# Patient Record
Sex: FEMALE | Race: WHITE | NOT HISPANIC OR LATINO | Employment: OTHER | URBAN - METROPOLITAN AREA
[De-identification: names, ages, dates, MRNs, and addresses within clinical notes are randomized per-mention and may not be internally consistent; named-entity substitution may affect disease eponyms.]

---

## 2017-05-09 ENCOUNTER — OFFICE VISIT (OUTPATIENT)
Dept: ENDOCRINOLOGY | Facility: CLINIC | Age: 62
End: 2017-05-09
Payer: COMMERCIAL

## 2017-05-09 VITALS
DIASTOLIC BLOOD PRESSURE: 70 MMHG | TEMPERATURE: 98.2 F | WEIGHT: 163.8 LBS | BODY MASS INDEX: 27.96 KG/M2 | SYSTOLIC BLOOD PRESSURE: 112 MMHG | RESPIRATION RATE: 16 BRPM | HEART RATE: 62 BPM | HEIGHT: 64 IN

## 2017-05-09 DIAGNOSIS — E03.4 HYPOTHYROIDISM DUE TO ACQUIRED ATROPHY OF THYROID: Primary | ICD-10-CM

## 2017-05-09 DIAGNOSIS — M85.80 OSTEOPENIA: ICD-10-CM

## 2017-05-09 LAB
T3 SERPL-MCNC: 110 NG/DL (ref 60–181)
T4 FREE SERPL-MCNC: 1.14 NG/DL (ref 0.76–1.46)

## 2017-05-09 PROCEDURE — 84480 ASSAY TRIIODOTHYRONINE (T3): CPT | Performed by: CLINICAL NURSE SPECIALIST

## 2017-05-09 PROCEDURE — 36415 COLL VENOUS BLD VENIPUNCTURE: CPT | Performed by: CLINICAL NURSE SPECIALIST

## 2017-05-09 PROCEDURE — 99213 OFFICE O/P EST LOW 20 MIN: CPT | Performed by: CLINICAL NURSE SPECIALIST

## 2017-05-09 PROCEDURE — 84443 ASSAY THYROID STIM HORMONE: CPT | Performed by: CLINICAL NURSE SPECIALIST

## 2017-05-09 PROCEDURE — 84439 ASSAY OF FREE THYROXINE: CPT | Performed by: CLINICAL NURSE SPECIALIST

## 2017-05-09 PROCEDURE — 82306 VITAMIN D 25 HYDROXY: CPT | Performed by: CLINICAL NURSE SPECIALIST

## 2017-05-09 NOTE — NURSING NOTE
"Chief Complaint   Patient presents with     Thyroid Problem     pt feels like her thyroid is \"off\", her nails are splitting and breaking, vision is effected, weight loss is a problem       Initial /70 (BP Location: Left arm, Patient Position: Chair, Cuff Size: Adult Regular)  Pulse 62  Temp 98.2  F (36.8  C) (Oral)  Resp 16  Ht 5' 3.5\" (1.613 m)  Wt 163 lb 12.8 oz (74.3 kg)  LMP 05/08/2008  BMI 28.56 kg/m2 Estimated body mass index is 28.56 kg/(m^2) as calculated from the following:    Height as of this encounter: 5' 3.5\" (1.613 m).    Weight as of this encounter: 163 lb 12.8 oz (74.3 kg).  Medication Reconciliation: complete       Matt Jordan CMA      "

## 2017-05-09 NOTE — MR AVS SNAPSHOT
After Visit Summary   5/9/2017    Yasmine Sutton    MRN: [id number]           Patient Information     Date Of Birth          1955        Visit Information        Provider Department      5/9/2017 12:30 PM Yasmine Rodriguez APRN CNP [facility]        Today's Diagnoses     Hypothyroidism due to acquired atrophy of thyroid    -  1    Osteopenia           Follow-ups after your visit        Follow-up notes from your care team     Return in about 1 year (around 5/9/2018).      Who to contact     If you have questions or need follow up information about today's clinic visit or your schedule please contact [facility] directly at [phone].  Normal or non-critical lab and imaging results will be communicated to you by MyChart, letter or phone within 4 business days after the clinic has received the results. If you do not hear from us within 7 days, please contact the clinic through [email or web address] or phone. If you have a critical or abnormal lab result, we will notify you by phone as soon as possible.  Submit refill requests through [employer] or call your pharmacy and they will forward the refill request to us. Please allow 3 business days for your refill to be completed.          Additional Information About Your Visit        MyChart Information     [employer] gives you secure access to your electronic health record. If you see a primary care provider, you can also send messages to your care team and make appointments. If you have questions, please call your primary care clinic.  If you do not have a primary care provider, please call [phone] and they will assist you.        Care EveryWhere ID     This is your Care EveryWhere ID. This could be used by other organizations to access your [facility] medical records  [id number]        Your Vitals Were     Pulse Temperature Respirations Height Last Period BMI (Body Mass Index)    62 98.2  F (36.8  C) (Oral) 16 1.613 m (5'  "3.5\") 05/08/2008 28.56 kg/m2       Blood Pressure from Last 3 Encounters:   05/09/17 112/70   07/07/16 115/60   03/24/16 114/71    Weight from Last 3 Encounters:   05/09/17 74.3 kg (163 lb 12.8 oz)   07/07/16 68.5 kg (151 lb)   03/24/16 65.3 kg (144 lb)              We Performed the Following     T3 total     T4 FREE     TSH     Vitamin D Deficiency        Primary Care Provider Office Phone # Fax #    Leland Tirado [phone]       [facility] [address] [facility]  [address]  [facility] [address]        Thank you!     Thank you for choosing [facility]  for your care. Our goal is always to provide you with excellent care. Hearing back from our patients is one way we can continue to improve our services. Please take a few minutes to complete the written survey that you may receive in the mail after your visit with us. Thank you!             Your Updated Medication List - Protect others around you: Learn how to safely use, store and throw away your medicines at www.disposemymeds.org.          This list is accurate as of: 5/9/17  1:41 PM.  Always use your most recent med list.                   Brand Name Dispense Instructions for use    buPROPion 150 MG 24 hr tablet    WELLBUTRIN XL    90 tablet    Take 1 tablet (150 mg) by mouth daily       cholecalciferol 5000 UNITS Tabs tablet    vitamin D3     Take 5,000 Units by mouth daily       liothyronine 5 MCG tablet    CYTOMEL    90 tablet    Take 1 tablet (5 mcg) by mouth daily ; please take 0.5 tablets twice daily.       MAGNESIUM OXIDE PO      Take 400 mg by mouth daily       PROBIOTIC DAILY PO          SYNTHROID 125 MCG tablet   Generic drug:  levothyroxine     30 tablet    Take 1 tablet (125 mcg) by mouth daily Dispense name brand only, no generic         "

## 2017-05-09 NOTE — PROGRESS NOTES
"Name: Yasmine Sutton (Last seen 7/7/2016).  F/u for   Chief Complaint   Patient presents with     Thyroid Problem     pt feels like her thyroid is \"off\", her nails are splitting and breaking, vision is effected, weight loss is a problem     HPI:  Yasmine Sutton is a 61 year old female who presents for the management of hypothyroidism.      Initially diagnosed with hyperthyroidism/Graves' disease in 2000 or 2001.  Was treated with oral medication with remission of the hyperthyroidism.  She subsequently became hypothyroid and was started on Synthroid.  Continued to be treated with Synthroid only until about 2 years ago when Cytomel 5 mcg daily was added due to continued symptoms of hypothyroidism.  Her insurance has changed and she is no longer covered at the clinic where she was previously seen and treated (was also taking bioidentical hormone replacement).  She had been off Cytomel and was not feeling well on Levothyroxine alone; she was c/o  +Dry skin, + weight gain-10 pounds, fatigue, brittle nails, increased hair loss, and constipation.  Cytomel was resumed and levothyroxine was changed to name brand Synthroid.    Synthroid dose was decreased 137-->125 mcg/day April 2016 due to low TSH.  She was not having any particular symptoms.  Cytomel was continued at 5 mcg daily.      She is here today for follow up.    She has not been feeling as well as in the past.  Currently noting generalized dryness - dry hair, dry skin, dry/thin finger and toe nails, dry eyes.  She also noted afternoon fatigue, states she 'hits a wall' in the afternoon and needs to take a short nap in order to continue usual activity.  + gains weight easily.  Previously lost 30 lbs on Slim[employer]ics but has since gained 20 lbs back.  Now seeing a nutritionist.    Partial hysterectomy 12/2014.     +hot flashes and night sweats - history of partial hysterectomy.  FSH elevated consistent with menopause. Took compounded HRT x 3 years, off x2 years.  Hot " flashes and night sweats previously resolved but recently noting return of hot flashes/facial flushing during the day and heat intolerance at night.  2.  Goiter.  Recent thyroid US showed stable goiter, no nodules.    3.  Osteopenia.  Takes vitamin D 5000 IU daily.  Eats 3-4 servings dairy per day - no additional calcium supplements.  Active, exercises regularly.   PMH/PSH:  Past Medical History:   Diagnosis Date     Abnormal Pap smear, can't excl hi gd sq intraepithelial lesion (ASC-H) 9/2014    + HPV 31     Cervical high risk HPV (human papillomavirus) test positive 11/2011, 11/2012    + HPV 31, NIL pap, '13colp:JORDIN I & II & III     Depression, major      PONV (postoperative nausea and vomiting)      Toxic diffuse goiter without mention of thyrotoxic crisis or storm 1991    resolved     Past Surgical History:   Procedure Laterality Date     ABDOMEN SURGERY      Abdominoplasty     APPENDECTOMY       C REMOVAL GALLBLADDER      Cholecystectomy     CYSTOSCOPY N/A 12/9/2014    Procedure: CYSTOSCOPY;  Surgeon: Joy Monterroso DO;  Location: [facility] OR     DAVINCI HYSTERECTOMY TOTAL, BILATERAL SALPINGO-OOPHORECTOMY, COMBINED Bilateral 12/9/2014    Procedure: COMBINED DAVINCI HYSTERECTOMY TOTAL, SALPINGO-OOPHORECTOMY;  Surgeon: Joy Monterroso DO;  Location: [facility] OR     HC EMBOLIZATION UTERINE FIBROID  2007     LEEP TX, CERVICAL  9/2013    JORDIN III     Family Hx:  Family History   Problem Relation Age of Onset     HEART DISEASE Mother      MI     Respiratory Mother      copd     Thyroid Disease Mother      hypothyroid     GASTROINTESTINAL DISEASE Mother      ulcer disease     DIABETES Brother      Thyroid Disease Brother      hypothyroid     Thyroid Disease Paternal Aunt      hypothyroid     GASTROINTESTINAL DISEASE Father      ulcer disease     C.A.D. Father      MI @ 69 (smoker, EtOH)     Thyroid Disease Sister      Thyroid Disease Sister      Thyroid disease: Yes - mother, two sisters, one brother         DM2:  "No         Autoimmune: DM1, SLE, RA, Vitiligo: Yes - brother with type 1 diabetes    Social Hx:  Social History     Social History     Marital status:      Spouse name: N/A     Number of children: N/A     Years of education: N/A     Occupational History           retired     Social History Main Topics     Smoking status: Former Smoker     Packs/day: 0.50     Years: 20.00     Quit date: 1/1/2013     Smokeless tobacco: Former User      Comment: e cig for a short time     Alcohol use 3.0 oz/week     5 Standard drinks or equivalent per week      Comment: 10 drinks on the weekends     Drug use: No     Sexual activity: Yes     Other Topics Concern      Service No     Blood Transfusions No     Caffeine Concern No     Occupational Exposure No     Hobby Hazards No     Sleep Concern No     Stress Concern No     Weight Concern No     Special Diet No     Back Care No     Exercise No     Bike Helmet No     Seat Belt Yes     Parent/Sibling W/ Cabg, Mi Or Angioplasty Before 65f 55m? No     Social History Narrative    , NO CHILDREN.  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[employer]          MEDICATIONS:  has a current medication list which includes the following prescription(s): cholecalciferol, synthroid, bupropion, liothyronine, probiotic product, and magnesium oxide.    ROS   ROS: 10 point ROS neg other than the symptoms noted above in the HPI.    Physical Exam   VS: /70 (BP Location: Left arm, Patient Position: Chair, Cuff Size: Adult Regular)  Pulse 62  Temp 98.2  F (36.8  C) (Oral)  Resp 16  Ht 1.613 m (5' 3.5\")  Wt 74.3 kg (163 lb 12.8 oz)  LMP 05/08/2008  BMI 28.56 kg/m2  GENERAL: NAD, well dressed, answering questions appropriately, appears stated age.  HEENT: no proptosis, EOMI, no lig lag, no retraction, no scleral icterus  NECK:  Supple,   RESPIRATORY: Clear.  Normal respiratory effort.  CARDIOVASCULAR: RRR.  No peripheral edema.  EXTREMITIES: no edema, +pulses, no rashes, no lesions  NEUROLOGY: " CN grossly intact, no tremors  MSK: grossly intact, No digital cyanosis. Normal gait and station.  SKIN: no rashes, no lesions, no ulcers  PSYCH: Intact judgment and insight. A&OX3 with a cordial affect.    LABS:  TFTs:  Component    Latest Ref Rng 8/31/2015 3/11/2016 6/13/2016   TSH    0.40 - 4.00 mU/L 4.69 (H) 0.23 (L) 1.80   T4 Free    0.76 - 1.46 ng/dL 1.15 1.38 1.22   Free T3    2.3 - 4.2 pg/mL 2.2 (L)       TG/TPO:  Component    Latest Ref Rng 4/5/2001 1/6/2009   Thyroid Peroxidase Antibody    <35 IU/mL  07158 (H)   Thyroid Peroxidase Antibody    <2 UNITS/ML >70 H    Thyroglobulin Antibody    <2 UNITS/ML 14 (H)      Component    Latest Ref Rng 8/31/2015   FSH     88.8     Component    Latest Ref Rng 8/31/2015   Vitamin D Deficiency screening    20 - 75 ug/L 32     ULTRASOUND THYROID 9/9/2015    HISTORY: Unspecified hypothyroidism.  FINDINGS: Thyroid ultrasound demonstrates an enlarged thyroid gland.  The right lobe measures 5.1 x 3.0 x 2.5 cm. The left lobe measures 6.3  x 2.2 x 2.8 cm. The isthmus is normal in thickness. Thyroid parenchyma  is heterogeneous in echotexture. Increased color Doppler flow is  noted within the gland.  Thyroid nodules as follows:   Right Lobe: None.  Isthmus: None.  Left Lobe: None.  IMPRESSION  IMPRESSION: Enlarged thyroid gland with heterogeneous echotexture. No  discrete thyroid nodules are appreciated. Increased color Doppler  flow is noted throughout the gland, likely indicating underlying  Thyroiditis.    DEXA 9/8/2015  FINDINGS:  Lumbar Spine (L1-L4) T-score: 1.4  Left Femoral Neck T-score: -0.4  Right Femoral Neck T-score: -1.6    Lumbar (L1-L4) BMD: 1.373 Previous: 1.530   Total Hip Mean BMD: 0.961 Previous: 1.075    Comparison is made to another DXA performed on a different [employer] machine on 12/27/2006.       IMPRESSION  Osteopenia (low bone mass)  Degenerative changes of the spine  Recommendations include ensuring adequate daily Calcium and Vitamin D intake  Follow up  scan can be considered in three to five years.    Comparisons from different scanners that have not been cross calibrated, are not necessarily valid. Such a comparison has been performed here; one should interpret with caution.  Compared to previous bone densitometry performed on this patient, there is the suggestion of a possible trend towards worsening of the lumbar spine, and a possible trend towards worsening of the total hip.    All pertinent notes, labs, and images personally reviewed by me.     A/P  Ms.Linda PANCHO Sutton is a 61 year old here for the evaluation of hypothyroidism:    1. Hypothyroidism.  Currently treated with Synthroid (GIORGI) 125 mcg daily + Cytomel 5 mg qd.    Tried taking Cytomel 2.5 mcg bid but always forgot to take the second dose.    Past history of Graves' disease, + significantly elevated TPO antibodies consistent with Hashimoto's. Continue Synthroid 125 mcg + Cytomel 5 mcg/day.   Noting some afternoon fatigue and increased dry skin and hair.    Plan to recheck TFT's and adjust Synthroid or cytomel dose if indicated.    2.  Osteopenia.    Recommend daily calcium intake 1200 mg/day.    Currently eats 3-4 servings of dairy per day.  Takes no additional calcium supplements.  Currently takes vitamin D 5000 IU daily   Plan to recheck D level today.    Repeat DEXA scan [id number].    Labs ordered today:   Orders Placed This Encounter   Procedures     TSH     T4 FREE     T3 total     Vitamin D Deficiency       More than 50% of the time spent with Ms. Sutton on counseling / coordinating her care.  Total face to face time was greater than or equal to 15 minutes.      Follow-up:  One year, prn sooner if needed.    Yasmine Rodriguez NP  Endocrinology  [facility]  CC: Leland Tirado

## 2017-05-10 LAB
DEPRECATED CALCIDIOL+CALCIFEROL SERPL-MC: 74 UG/L (ref 20–75)
TSH SERPL DL<=0.05 MIU/L-ACNC: 1.23 MU/L (ref 0.4–4)

## 2017-05-11 NOTE — PROGRESS NOTES
Yasmine,  Your thyroid levels look good, I don't think you need any dose adjustments right now.  Your TSH level is a little better than the previous level and free T4, T3 are stable.  If your symptoms persist or worsen, you can recheck thyroid levels again in another 3 months (I'll place the orders, you just need to make a lab only appointment).  Your D level is a little on the high-normal side.  You could reduce your D dose to 3028-9583 per day.  I'm not sure what's causing your symptoms.  Make sure you are eating a healthy, well balanced diet and getting enough protein.  You could add a prenatal or regular multivitamin daily to see if that helps any (the prenatal just has a bit more iron and folic acid).  Let me know if you have any questions.  Yasmine Rodriguez NP  Endocrinology

## 2017-08-11 DIAGNOSIS — Z86.39 H/O GRAVES' DISEASE: ICD-10-CM

## 2017-08-11 DIAGNOSIS — N95.1 MENOPAUSAL SYNDROME (HOT FLASHES): ICD-10-CM

## 2017-08-11 NOTE — TELEPHONE ENCOUNTER
Received refill from mail order for Synthroid and bupropion. These medications are denied, Pt last seen here 1/21/16. Synthroid needs to come from Endo. Sent note back to mail order pharmacy

## 2017-08-14 RX ORDER — LIOTHYRONINE SODIUM 5 UG/1
2.5 TABLET ORAL DAILY
Qty: 45 TABLET | Refills: 3 | Status: SHIPPED | OUTPATIENT
Start: 2017-08-14 | End: 2018-08-10

## 2017-08-18 DIAGNOSIS — E03.4 HYPOTHYROIDISM DUE TO ACQUIRED ATROPHY OF THYROID: ICD-10-CM

## 2017-08-18 RX ORDER — LEVOTHYROXINE SODIUM 125 MCG
125 TABLET ORAL DAILY
Qty: 90 TABLET | Refills: 3 | Status: SHIPPED | OUTPATIENT
Start: 2017-08-18 | End: 2017-11-24

## 2017-08-18 NOTE — TELEPHONE ENCOUNTER
Synthroid 125mcg     Last Written Prescription Date: 8/11/2016  Last Quantity: 30, # refills: 0  Last Office Visit with [facility], [facility] or [facility] prescribing provider: 5/9/2017        TSH   Date Value Ref Range Status   05/09/2017 1.23 0.40 - 4.00 mU/L Final       Routing refill request to provider for review/approval because:  A break in medication  Medication is reported/historical ?    Soraya GARCIA RN, BSN, PHN  [place] [facility] RN

## 2017-10-12 ENCOUNTER — MYC MEDICAL ADVICE (OUTPATIENT)
Dept: ENDOCRINOLOGY | Facility: CLINIC | Age: 62
End: 2017-10-12

## 2017-10-13 NOTE — TELEPHONE ENCOUNTER
Bupropion HCl refill request should go through PCP as it is prescribed for depression.      Patient informed with this and agreed to plan. She will call her new PMD to refills this.     Soraya GARCIA RN, BSN, PHN  [place] [facility] RN

## 2017-10-20 ENCOUNTER — OFFICE VISIT (OUTPATIENT)
Dept: FAMILY MEDICINE | Facility: CLINIC | Age: 62
End: 2017-10-20

## 2017-10-20 VITALS
HEIGHT: 63 IN | WEIGHT: 169 LBS | SYSTOLIC BLOOD PRESSURE: 110 MMHG | RESPIRATION RATE: 16 BRPM | DIASTOLIC BLOOD PRESSURE: 70 MMHG | OXYGEN SATURATION: 98 % | TEMPERATURE: 98.6 F | BODY MASS INDEX: 29.95 KG/M2 | HEART RATE: 76 BPM

## 2017-10-20 DIAGNOSIS — F33.42 MAJOR DEPRESSIVE DISORDER, RECURRENT EPISODE, IN FULL REMISSION (H): Primary | ICD-10-CM

## 2017-10-20 PROCEDURE — 99214 OFFICE O/P EST MOD 30 MIN: CPT | Performed by: FAMILY MEDICINE

## 2017-10-20 RX ORDER — BUPROPION HYDROCHLORIDE 150 MG/1
150 TABLET ORAL DAILY
Qty: 90 TABLET | Refills: 3 | Status: SHIPPED | OUTPATIENT
Start: 2017-10-20 | End: 2018-12-17

## 2017-10-20 ASSESSMENT — PATIENT HEALTH QUESTIONNAIRE - PHQ9: SUM OF ALL RESPONSES TO PHQ QUESTIONS 1-9: 1

## 2017-10-20 NOTE — PROGRESS NOTES
"SUBJECTIVE:  Yasmine Sutton is an 62 year old female who presents for follow-up   of depressive symptoms.  Initially evaluated 25 years ago with depression and irritability. 20 years ago diagnosed with low thyroid.   Current symptoms include   none. Symptoms that have subjectively improved include anger, depressed mood, hopelessness, weight gain, insomnia, fatigue, difficulty with concentration, anxiety, irritablility, sleep disturbance, early morning awakening.  Previous and current treatment modalities   employed include individual therapy and medication(s) Wellbutrin (bupropion).     Organic causes of depression present: hypothyroidism, menopause    Current Outpatient Prescriptions   Medication     SYNTHROID 125 MCG tablet     liothyronine (CYTOMEL) 5 MCG tablet     cholecalciferol (VITAMIN D3) 5000 UNITS TABS tablet     buPROPion (WELLBUTRIN XL) 150 MG 24 hr tablet     Probiotic Product (PROBIOTIC DAILY PO)     MAGNESIUM OXIDE PO     No current facility-administered medications for this visit.      Allergies   Allergen Reactions     No Known Drug Allergies      Side effects of medication: none    Social History   Substance Use Topics     Smoking status: Former Smoker     Packs/day: 0.50     Years: 20.00     Quit date: 1/1/2013     Smokeless tobacco: Former User      Comment: e cig for a short time     Alcohol use 3.0 oz/week     5 Standard drinks or equivalent per week      Comment: 10 drinks on the weekends         Neurologic: negative  Psychiatric: negative  Endocrine: thyroid disorder    OBJECTIVE:  /70 (BP Location: Right arm, Cuff Size: Adult Large)  Pulse 76  Temp 98.6  F (37  C) (Oral)  Ht 1.594 m (5' 2.75\")  Wt 76.7 kg (169 lb)  LMP 05/08/2008  SpO2 98%  BMI 30.18 kg/m2  Mental Status Examination  Posture and motor behavior: negative  Dress, grooming, personal hygiene: negative  Facial expression: negative  Speech: negative  Mood: negative  Coherency and relevance of thought: " negative  Thought content: negative  Perceptions: negative  Orientation: negative  Attention and concentration: negative  Memory: : negative  Information: negative  Vocabulary: negative  Abstract reasoning: negative  Judgment: negative    General appearance: healthy, alert, no distress, cooperative, smiling and over weight  Eyes: conjunctivae/corneas clear. PERRL, EOM's intact. Fundi benign  Ears: negative  Oropharynx: Lips, mucosa, and tongue normal. Teeth and gums normal.  Neck: Neck supple. No adenopathy. Thyroid symmetric, normal size,, Carotids without bruits.  Lungs: negative, Percussion normal. Good diaphragmatic excursion. Lungs clear  Heart: negative, PMI normal. No lifts, heaves, or thrills. RRR. No murmurs, clicks gallops or rub  Abdomen: Abdomen soft, non-tender. BS normal. No masses, organomegaly  Neuro: Gait normal. Reflexes normal and symmetric. Sensation grossly WNL.    ASSESSMENT:(F33.42) Major depressive disorder, recurrent episode, in full remission (H)  (primary encounter diagnosis)  Comment: reviewed GYN needs, prevention  Plan: buPROPion (WELLBUTRIN XL) 150 MG 24 hr tablet        Continue current dosing/ recheck 1 year.  Potential medication side effects were discussed with the patient; let me know if any occur.

## 2017-10-20 NOTE — PATIENT INSTRUCTIONS
Major depressive disorder, recurrent episode, in full remission (H)  (primary encounter diagnosis)  Comment: reviewed GYN needs, prevention  Plan: buPROPion (WELLBUTRIN XL) 150 MG 24 hr tablet        Continue current dosing/ recheck 1 year.  Potential medication side effects were discussed with the patient; let me know if any occur.

## 2017-10-20 NOTE — NURSING NOTE
Patient is here for a recheck of their medication.  Pre-Visit Screening :  Immunizations : declined    Colonoscopy : is due and to be scheduled by patient for later completion  Mammogram : is up to date  Asthma Action Test/Plan : na  PHQ9/GAD7 :  na  Pulse - regular    Medication Reconciliation: complete      CLASSIFICATION OF OVERWEIGHT AND OBESITY BY BMI                         Obesity Class           BMI(kg/m2)  Underweight                                    < 18.5  Normal                                         18.5-24.9  Overweight                                     25.0-29.9  OBESITY                     I                  30.0-34.9                              II                 35.0-39.9  EXTREME OBESITY             III                >40                             Patient's  BMI Body mass index is 30.18 kg/(m^2).  http://hin.nhlbi.nih.gov/menuplanner/menu.cgi  Questioned patient about current smoking habits.  Pt. has never smoked.

## 2017-10-20 NOTE — MR AVS SNAPSHOT
After Visit Summary   10/20/2017    Yasmine Sutton    MRN: [id number]           Patient Information     Date Of Birth          1955        Visit Information        Provider Department      10/20/2017 12:00 PM Elvia Boles MD [facility]        Today's Diagnoses     Major depressive disorder, recurrent episode, in full remission (H)    -  1      Care Instructions     Major depressive disorder, recurrent episode, in full remission (H)  (primary encounter diagnosis)  Comment: reviewed GYN needs, prevention  Plan: buPROPion (WELLBUTRIN XL) 150 MG 24 hr tablet        Continue current dosing/ recheck 1 year.  Potential medication side effects were discussed with the patient; let me know if any occur.              Follow-ups after your visit        Follow-up notes from your care team     Return in about 1 year (around 10/20/2018).      Who to contact     If you have questions or need follow up information about today's clinic visit or your schedule please contact [facility] directly at [phone].  Normal or non-critical lab and imaging results will be communicated to you by [employer], letter or phone within 4 business days after the clinic has received the results. If you do not hear from us within 7 days, please contact the clinic through Ch[employer] or phone. If you have a critical or abnormal lab result, we will notify you by phone as soon as possible.  Submit refill requests through [employer] or call your pharmacy and they will forward the refill request to us. Please allow 3 business days for your refill to be completed.          Additional Information About Your Visit        [employer] Information     [employer] gives you secure access to your electronic health record. If you see a primary care provider, you can also send messages to your care team and make appointments. If you have questions, please call your primary care clinic.  If you do not have a  "primary care provider, please call [phone] and they will assist you.        Care EveryWhere ID     This is your Care EveryWhere ID. This could be used by other organizations to access your [place] medical records  [id number]        Your Vitals Were     Pulse Temperature Respirations Height Last Period Pulse Oximetry    76 98.6  F (37  C) (Oral) 16 1.594 m (5' 2.75\") 05/08/2008 98%    BMI (Body Mass Index)                   30.18 kg/m2            Blood Pressure from Last 3 Encounters:   10/20/17 110/70   05/09/17 112/70   07/07/16 115/60    Weight from Last 3 Encounters:   10/20/17 76.7 kg (169 lb)   05/09/17 74.3 kg (163 lb 12.8 oz)   07/07/16 68.5 kg (151 lb)              Today, you had the following     No orders found for display         Where to get your medicines      These medications were sent to [facility]/pharmacy #[id number] - [place], [place] - [address]  [address] [address] [address]    Hours:  [facility] Phone:  [phone]     buPROPion 150 MG 24 hr tablet          Primary Care Provider Office Phone # Fax #    Elvia Boles [phone] [address] [address]        Equal Access to Services     ZAHRA [facility]IMAR AH: Hadii enmanuel ku hadasho Soomaali, waaxda luqadaha, qaybta kaalmada aliya, naomie manrique . So [facility] [phone].    ATENCIÓN: Si habla español, tiene a lala disposición servicios gratuitos de asistencia lingüística. Dionisio al [phone].    We comply with applicable federal civil rights laws and [place] laws. We do not discriminate on the basis of race, color, national origin, age, disability, sex, sexual orientation, or gender identity.            Thank you!     Thank you for choosing [facility]  for your care. Our goal is always to provide you with excellent care. Hearing back from our patients is one way we can continue to improve our services. Please take a few " minutes to complete the written survey that you may receive in the mail after your visit with us. Thank you!             Your Updated Medication List - Protect others around you: Learn how to safely use, store and throw away your medicines at www.disposemymeds.org.          This list is accurate as of: 10/20/17 12:47 PM.  Always use your most recent med list.                   Brand Name Dispense Instructions for use Diagnosis    buPROPion 150 MG 24 hr tablet    WELLBUTRIN XL    90 tablet    Take 1 tablet (150 mg) by mouth daily    Major depressive disorder, recurrent episode, in full remission (H)       cholecalciferol 5000 UNITS Tabs tablet    vitamin D3     Take 5,000 Units by mouth daily        liothyronine 5 MCG tablet    CYTOMEL    45 tablet    Take 0.5 tablets (2.5 mcg) by mouth daily    H/O Graves' disease, Menopausal syndrome (hot flashes)       MAGNESIUM OXIDE PO      Take 400 mg by mouth daily    Unspecified hypothyroidism       PROBIOTIC DAILY PO           SYNTHROID 125 MCG tablet   Generic drug:  levothyroxine     90 tablet    Take 1 tablet (125 mcg) by mouth daily Dispense name brand only, no generic    Hypothyroidism due to acquired atrophy of thyroid

## 2017-11-24 DIAGNOSIS — E03.4 HYPOTHYROIDISM DUE TO ACQUIRED ATROPHY OF THYROID: ICD-10-CM

## 2017-11-24 NOTE — TELEPHONE ENCOUNTER
Synthroid 125 MCG   Last Written Prescription Date: 08/18/17  Last Quantity: 90, # refills: 3  Last Office Visit with [facility] prescribing provider: 05/09/2017       TSH   Date Value Ref Range Status   05/09/2017 1.23 0.40 - 4.00 mU/L Final

## 2017-11-27 RX ORDER — LEVOTHYROXINE SODIUM 125 MCG
125 TABLET ORAL DAILY
Status: SHIPPED
Start: 2017-11-27 | End: 2018-08-16

## 2017-11-27 NOTE — TELEPHONE ENCOUNTER
Year RX sent 8/8/17.  Check computer for that date. Receipt confirmed by pharmacy (8/18/2017  5:23 PM CDT)     Syeda Curry RN

## 2018-04-27 ENCOUNTER — TELEPHONE (OUTPATIENT)
Dept: MAMMOGRAPHY | Facility: CLINIC | Age: 63
End: 2018-04-27

## 2018-08-10 DIAGNOSIS — N95.1 MENOPAUSAL SYNDROME (HOT FLASHES): ICD-10-CM

## 2018-08-10 DIAGNOSIS — Z86.39 H/O GRAVES' DISEASE: ICD-10-CM

## 2018-08-10 NOTE — LETTER
August 15, 2018      Yasmine Sutton  [address] [address] [facility] [address]        Dear Yasmine,     We recently received a call from your pharmacy requesting a refill of Cytomel.  I tried to reach you by phone but was unsuccessful.     A review of your chart indicates that an appointment is required with your provider for med check and labs to continue this medication with Lucia Santo. You were due 5/9/18.  Please call the clinic at [phone] to schedule your appointment.     Yasmine authorized a one month refill of your medication to allow time for you to schedule your appointment.      Taking care of your health is important to us and ongoing visits with your provider are vital to your care.  We look forward to seeing you in the near future.     Sincerely,        WYATT Molina CNP/Syeda Curry RN

## 2018-08-10 NOTE — TELEPHONE ENCOUNTER
"Requested Prescriptions   Pending Prescriptions Disp Refills     liothyronine (CYTOMEL) 5 MCG tablet [Pharmacy Med Name: LIOTHYRONINE SOD TABS 5MCG] 45 tablet 3    Last Written Prescription Date:  8/14/17  Last Fill Quantity: 45,  # refills: 3   Last Office Visit: 5/9/2017   Future Office Visit:    Next 5 appointments (look out 90 days)     Aug 28, 2018  1:30 PM CDT   PHYSICAL with Joy Monterroso,    [facility] ([facility]    [address] [address] [phone]                  Sig: TAKE ONE-HALF (1/2) TABLET DAILY    Thyroid Protocol Failed    8/10/2018 12:27 AM       Failed - Recent (12 mo) or future (30 days) visit within the authorizing provider's specialty    Patient had office visit in the last 12 months or has a visit in the next 30 days with authorizing provider or within the authorizing provider's specialty.  See \"Patient Info\" tab in inbasket, or \"Choose Columns\" in Meds & Orders section of the refill encounter.           Failed - Normal TSH on file in past 12 months    Recent Labs   Lab Test  05/09/17   1323   TSH  1.23             Passed - Patient is 12 years or older       Passed - No active pregnancy on record    If patient is pregnant or has had a positive pregnancy test, please check TSH.         Passed - No positive pregnancy test in past 12 months    If patient is pregnant or has had a positive pregnancy test, please check TSH.            "

## 2018-08-14 RX ORDER — LIOTHYRONINE SODIUM 5 UG/1
TABLET ORAL
Qty: 30 TABLET | Refills: 0 | Status: SHIPPED | OUTPATIENT
Start: 2018-08-14 | End: 2018-08-16

## 2018-08-14 NOTE — TELEPHONE ENCOUNTER
I refilled the cytomel for a 30-day supply.  I can give her more refills once she has scheduled a follow up visit to last until she can be seen. If you can't reach her by phone, you can send a letter.  Yasmine Rodriguez NP  Endocrinology

## 2018-08-14 NOTE — TELEPHONE ENCOUNTER
Routing refill request to provider for review/approval because:  Patient needs to be seen because it has been more than 1 year since last office visit.  Bao hCeng RN, BSN

## 2018-08-16 ENCOUNTER — OFFICE VISIT (OUTPATIENT)
Dept: ENDOCRINOLOGY | Facility: CLINIC | Age: 63
End: 2018-08-16
Payer: COMMERCIAL

## 2018-08-16 VITALS
SYSTOLIC BLOOD PRESSURE: 104 MMHG | DIASTOLIC BLOOD PRESSURE: 54 MMHG | WEIGHT: 160.8 LBS | TEMPERATURE: 97.7 F | HEART RATE: 75 BPM | BODY MASS INDEX: 28.71 KG/M2

## 2018-08-16 DIAGNOSIS — E03.4 HYPOTHYROIDISM DUE TO ACQUIRED ATROPHY OF THYROID: Primary | ICD-10-CM

## 2018-08-16 LAB
T4 FREE SERPL-MCNC: 1.18 NG/DL (ref 0.76–1.46)
TSH SERPL DL<=0.005 MIU/L-ACNC: 4.28 MU/L (ref 0.4–4)

## 2018-08-16 PROCEDURE — 84443 ASSAY THYROID STIM HORMONE: CPT | Performed by: CLINICAL NURSE SPECIALIST

## 2018-08-16 PROCEDURE — 99214 OFFICE O/P EST MOD 30 MIN: CPT | Performed by: CLINICAL NURSE SPECIALIST

## 2018-08-16 PROCEDURE — 36415 COLL VENOUS BLD VENIPUNCTURE: CPT | Performed by: CLINICAL NURSE SPECIALIST

## 2018-08-16 PROCEDURE — 84439 ASSAY OF FREE THYROXINE: CPT | Performed by: CLINICAL NURSE SPECIALIST

## 2018-08-16 RX ORDER — LEVOTHYROXINE SODIUM 125 MCG
125 TABLET ORAL DAILY
Qty: 90 TABLET | Refills: 3 | Status: SHIPPED | OUTPATIENT
Start: 2018-08-16 | End: 2018-11-08

## 2018-08-16 RX ORDER — LEVOTHYROXINE SODIUM 125 MCG
125 TABLET ORAL DAILY
Qty: 90 TABLET | Refills: 11 | Status: SHIPPED | OUTPATIENT
Start: 2018-08-16 | End: 2018-11-08

## 2018-08-16 NOTE — PATIENT INSTRUCTIONS
I am obtaining thyroid levels today and I'll let you know if the levels look ok     Repeat bone density 9/2018 to 9/2020

## 2018-08-16 NOTE — MR AVS SNAPSHOT
After Visit Summary   8/16/2018    Yasmine Sutton    MRN: [id number]           Patient Information     Date Of Birth          1955        Visit Information        Provider Department      8/16/2018 11:00 AM Yasmine Rodriguez APRN CNP [facility]        Today's Diagnoses     Hypothyroidism due to acquired atrophy of thyroid    -  1      Care Instructions    I am obtaining thyroid levels today and I'll let you know if the levels look ok     Repeat bone density 9/2018 to 9/2020          Follow-ups after your visit        Follow-up notes from your care team     Return in about 1 year (around 8/16/2019).      Your next 10 appointments already scheduled     Aug 28, 2018  1:30 PM CDT   PHYSICAL with Joy Monterroso,    [facility]    [address] [address] [phone]              Who to contact     If you have questions or need follow up information about today's clinic visit or your schedule please contact [facility] directly at [phone].  Normal or non-critical lab and imaging results will be communicated to you by MyChart, letter or phone within 4 business days after the clinic has received the results. If you do not hear from us within 7 days, please contact the clinic through [employer] or phone. If you have a critical or abnormal lab result, we will notify you by phone as soon as possible.  Submit refill requests through Strut or call your pharmacy and they will forward the refill request to us. Please allow 3 business days for your refill to be completed.          Additional Information About Your Visit        MyChart Information     [employer] gives you secure access to your electronic health record. If you see a primary care provider, you can also send messages to your care team and make appointments. If you have questions, please call your primary care clinic.  If you do not have  a primary care provider, please call [phone] and they will assist you.        Care EveryWhere ID     This is your Care EveryWhere ID. This could be used by other organizations to access your [place] medical records  [id number]        Your Vitals Were     Pulse Temperature Last Period Breastfeeding? BMI (Body Mass Index)       75 97.7  F (36.5  C) (Oral) 05/08/2008 No 28.71 kg/m2        Blood Pressure from Last 3 Encounters:   08/16/18 104/54   10/20/17 110/70   05/09/17 112/70    Weight from Last 3 Encounters:   08/16/18 72.9 kg (160 lb 12.8 oz)   10/20/17 76.7 kg (169 lb)   05/09/17 74.3 kg (163 lb 12.8 oz)              We Performed the Following     T4 FREE     TSH          Today's Medication Changes          These changes are accurate as of 8/16/18 11:58 AM.  If you have any questions, ask your nurse or doctor.               These medicines have changed or have updated prescriptions.        Dose/Directions    * SYNTHROID 125 MCG tablet   This may have changed:  Another medication with the same name was added. Make sure you understand how and when to take each.   Used for:  Hypothyroidism due to acquired atrophy of thyroid   Generic drug:  levothyroxine   Changed by:  Yasmine Rodriguez APRN CNP        Dose:  125 mcg   Take 1 tablet (125 mcg) by mouth daily Dispense name brand only, no generic   Quantity:  90 tablet   Refills:  3       * SYNTHROID 125 MCG tablet   This may have changed:  You were already taking a medication with the same name, and this prescription was added. Make sure you understand how and when to take each.   Used for:  Hypothyroidism due to acquired atrophy of thyroid   Generic drug:  levothyroxine   Changed by:  Yasmine Rodriguez APRN CNP        Dose:  125 mcg   Take 1 tablet (125 mcg) by mouth daily Dispense name brand Synthroid, no generic   Quantity:  90 tablet   Refills:  11       * Notice:  This list has 2 medication(s) that are the same as other medications prescribed for  you. Read the directions carefully, and ask your doctor or other care provider to review them with you.         Where to get your medicines      These medications were sent to [facility]/pharmacy #[id number] - [place], [place] - [address] [address]  [address] [address] [address]    Hours:  [facility] Phone:  [phone]     SYNTHROID 125 MCG tablet         These medications were sent to [facility] - [place] [address]  [address] [address]     Phone:  [phone]     SYNTHROID 125 MCG tablet                Primary Care Provider Fax #    Provider Not In System [phone]                Equal Access to Services     ZAHRA CARLISLE : Hadii enmanuel De La Cruz, waaxda luqadaha, qaybta kaalmada adedeeyada, naomie manrique . So [facility] [phone].    ATENCIÓN: Si habla español, tiene a lala disposición servicios gratuitos de asistencia lingüística. [facility] [phone].    We comply with applicable federal civil rights laws and [place] laws. We do not discriminate on the basis of race, color, national origin, age, disability, sex, sexual orientation, or gender identity.            Thank you!     Thank you for choosing [facility]  for your care. Our goal is always to provide you with excellent care. Hearing back from our patients is one way we can continue to improve our services. Please take a few minutes to complete the written survey that you may receive in the mail after your visit with us. Thank you!             Your Updated Medication List - Protect others around you: Learn how to safely use, store and throw away your medicines at www.disposemymeds.org.          This list is accurate as of 8/16/18 11:58 AM.  Always use your most recent med list.                   Brand Name Dispense Instructions for use Diagnosis    buPROPion 150 MG 24 hr tablet    WELLBUTRIN XL    90 tablet    Take 1 tablet (150  mg) by mouth daily    Major depressive disorder, recurrent episode, in full remission (H)       cholecalciferol 5000 units Tabs tablet    vitamin D3     Take 5,000 Units by mouth daily        MAGNESIUM OXIDE PO      Take 400 mg by mouth daily    Unspecified hypothyroidism       PROBIOTIC DAILY PO           * SYNTHROID 125 MCG tablet   Generic drug:  levothyroxine     90 tablet    Take 1 tablet (125 mcg) by mouth daily Dispense name brand only, no generic    Hypothyroidism due to acquired atrophy of thyroid       * SYNTHROID 125 MCG tablet   Generic drug:  levothyroxine     90 tablet    Take 1 tablet (125 mcg) by mouth daily Dispense name brand Synthroid, no generic    Hypothyroidism due to acquired atrophy of thyroid       * Notice:  This list has 2 medication(s) that are the same as other medications prescribed for you. Read the directions carefully, and ask your doctor or other care provider to review them with you.

## 2018-08-16 NOTE — NURSING NOTE
Called [facility] at [phone] and spoke with Gloria.  Was transferred x 2.  Spoke with Sondra the pharmacist.  Cancelled the Synthroid prescription from today at the patients request as she decided to fill at her local pharmacy.  Theodora Kern M.A.

## 2018-08-16 NOTE — PROGRESS NOTES
Name: Yasmine Sutton (Last seen 5/9/2017).  F/u for   Chief Complaint   Patient presents with     Thyroid Problem     HPI:  Yasmine Sutton is a 62 year old female who presents for the management of hypothyroidism.      Initially diagnosed with hyperthyroidism/Graves' disease in 2000 or 2001.  Was treated with oral medication with remission of the hyperthyroidism.  She subsequently became hypothyroid and was started on Synthroid.  Continued to be treated with Synthroid only until about 2 years ago when Cytomel 5 mcg daily was added due to continued symptoms of hypothyroidism.  Her insurance has changed and she is no longer covered at the clinic where she was previously seen and treated (was also taking bioidentical hormone replacement).  She had been off Cytomel and was not feeling well on Levothyroxine alone; she was c/o  +Dry skin, + weight gain-10 pounds, fatigue, brittle nails, increased hair loss, and constipation.  Cytomel was resumed and levothyroxine was changed to name brand Synthroid.    Synthroid dose was decreased 137-->125 mcg/day April 2016 due to low TSH.  She was not having any particular symptoms.  Cytomel was continued at 5 mcg daily.    Ran out of Cytomel 6 months ago - feels about the same.    She is here today for follow up.  Symptoms stable - no changes    Partial hysterectomy 12/2014.     +hot flashes and night sweats - history of partial hysterectomy.  FSH elevated consistent with menopause. Took compounded HRT x 3 years, off x2 years.  Hot flashes and night sweats previously resolved but recently noting return of hot flashes/facial flushing during the day and heat intolerance at night.  2.  Goiter.  Thyroid US showed stable goiter, no nodules.    3.  Osteopenia.  Takes vitamin D 5000 IU four days per week.  This dose was decreased from 5000 units everyday one year ago due to high-normal vitamin D level.  Eats 3-4 servings dairy per day - no additional calcium supplements.  Active, exercises  regularly.   PMH/PSH:  Past Medical History:   Diagnosis Date     Abnormal Pap smear, can't excl hi gd sq intraepithelial lesion (ASC-H) 9/2014    + HPV 31     Cervical high risk HPV (human papillomavirus) test positive 11/2011, 11/2012    + HPV 31, NIL pap, '13colp:JORDIN I & II & III     Depression, major      Major depressive disorder, recurrent episode, in full remission (H) 10/20/2017     PONV (postoperative nausea and vomiting)      Toxic diffuse goiter without mention of thyrotoxic crisis or storm 1991    resolved     Past Surgical History:   Procedure Laterality Date     ABDOMEN SURGERY      Abdominoplasty     APPENDECTOMY       CYSTOSCOPY N/A 12/9/2014    Procedure: CYSTOSCOPY;  Surgeon: Joy Monterroso DO;  Location: [facility]     DAVINCI HYSTERECTOMY TOTAL, BILATERAL SALPINGO-OOPHORECTOMY, COMBINED Bilateral 12/9/2014    Procedure: COMBINED DAVINCI HYSTERECTOMY TOTAL, SALPINGO-OOPHORECTOMY;  Surgeon: Joy Monterroso DO;  Location: [facility] OR     HC EMBOLIZATION UTERINE FIBROID  2007     HC REMOVAL GALLBLADDER      Cholecystectomy     LEEP TX, CERVICAL  9/2013    JORDIN III     Family Hx:  Family History   Problem Relation Age of Onset     HEART DISEASE Mother      MI     Respiratory Mother      copd     Thyroid Disease Mother      hypothyroid     GASTROINTESTINAL DISEASE Mother      ulcer disease     Depression Mother      GASTROINTESTINAL DISEASE Father      ulcer disease     C.A.D. Father      MI @ 69 (smoker, EtOH)     Depression Father      Diabetes Brother      Thyroid Disease Brother      hypothyroid     Thyroid Disease Paternal Aunt      hypothyroid     Thyroid Disease Sister      Thyroid Disease Sister      Thyroid disease: Yes - mother, two sisters, one brother         DM2: No         Autoimmune: DM1, SLE, RA, Vitiligo: Yes - brother with type 1 diabetes    Social Hx:  Social History     Social History     Marital status:      Spouse name: N/A     Number of children: 4     Years of education:  N/A     Occupational History           retired     Social History Main Topics     Smoking status: Former Smoker     Packs/day: 0.50     Years: 20.00     Quit date: 1/1/2013     Smokeless tobacco: Former User      Comment: e cig for a short time     Alcohol use 3.0 oz/week     5 Standard drinks or equivalent per week      Comment: 10 drinks on the weekends     Drug use: No     Sexual activity: Yes     Birth control/ protection: Post-menopausal     Other Topics Concern      Service No     Blood Transfusions No     Caffeine Concern No     Occupational Exposure No     Hobby Hazards No     Sleep Concern No     Stress Concern No     Weight Concern No     Special Diet No     Back Care No     Exercise No     Bike Helmet No     Seat Belt Yes     Parent/Sibling W/ Cabg, Mi Or Angioplasty Before 65f 55m? No     Social History Narrative    , NO CHILDREN.  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    ROS   ROS: 10 point ROS neg other than the symptoms noted above in the HPI.    Physical Exam   VS: /54 (BP Location: Left arm, Patient Position: Chair, Cuff Size: Adult Large)  Pulse 75  Temp 97.7  F (36.5  C) (Oral)  Wt 72.9 kg (160 lb 12.8 oz)  LMP 05/08/2008  Breastfeeding? No  BMI 28.71 kg/m2  GENERAL: NAD, well dressed, answering questions appropriately, appears stated age.  HEENT: no proptosis, no lig lag, no retraction, no scleral icterus  NECK:  Supple, thyroid gland slightly enlarged, left > right, no distinct nodules, no adenopathy.  RESPIRATORY: Clear.  Normal respiratory effort.  CARDIOVASCULAR: RRR.  No peripheral edema.  NEUROLOGY: CN grossly intact, no tremors  MSK: grossly intact, No digital cyanosis. Normal gait and station.  PSYCH: Intact judgment and insight. A&OX3 with a cordial affect.    LABS:  TFTs:  !THYROID  Latest Ref Rng & Units 5/9/2017   TSH 0.40 - 4.00 mU/L 1.23   T4 FREE 0.76 - 1.46 ng/dL 1.14     Component    Latest Ref Rng 8/31/2015 3/11/2016 6/13/2016   TSH    0.40 - 4.00 mU/L 4.69 (H) 0.23 (L) 1.80   T4 Free    0.76 - 1.46 ng/dL 1.15 1.38 1.22   Free T3    2.3 - 4.2 pg/mL 2.2 (L)       TG/TPO:  Component    Latest Ref Rng 4/5/2001 1/6/2009   Thyroid Peroxidase Antibody    <35 IU/mL  17555 (H)   Thyroid Peroxidase Antibody    <2 UNITS/ML >70 H    Thyroglobulin Antibody    <2 UNITS/ML 14 (H)      Component    Latest Ref Rng 8/31/2015   FSH     88.8     Component    Latest Ref Rng 8/31/2015   Vitamin D Deficiency screening    20 - 75 ug/L 32     ULTRASOUND THYROID 9/9/2015    HISTORY: Unspecified hypothyroidism.  FINDINGS: Thyroid ultrasound demonstrates an enlarged thyroid gland.  The right lobe measures 5.1 x 3.0 x 2.5 cm. The left lobe measures 6.3  x 2.2 x 2.8 cm. The isthmus is normal in thickness. Thyroid parenchyma  is heterogeneous in echotexture. Increased color Doppler flow is  noted within the gland.  Thyroid nodules as follows:   Right Lobe: None.  Isthmus: None.  Left Lobe: None.  IMPRESSION  IMPRESSION: Enlarged thyroid gland with heterogeneous echotexture. No  discrete thyroid nodules are appreciated. Increased color Doppler  flow is noted throughout the gland, likely indicating underlying  Thyroiditis.    DEXA 9/8/2015  FINDINGS:  Lumbar Spine (L1-L4) T-score: 1.4  Left Femoral Neck T-score: -0.4  Right Femoral Neck T-score: -1.6    Lumbar (L1-L4) BMD: 1.373 Previous: 1.530   Total Hip Mean BMD: 0.961 Previous: 1.075    Comparison is made to another DXA performed on a different [employer] machine on 12/27/2006.       IMPRESSION  Osteopenia (low bone mass)  Degenerative changes of the spine  Recommendations include ensuring adequate daily Calcium and Vitamin D intake  Follow up scan can be considered in three to five years.    Comparisons from different scanners that have not been cross calibrated, are  not necessarily valid. Such a comparison has been performed here; one should interpret with caution.  Compared to previous bone densitometry performed on this patient, there is the suggestion of a possible trend towards worsening of the lumbar spine, and a possible trend towards worsening of the total hip.    All pertinent notes, labs, and images personally reviewed by me.     A/P  Ms.Linda PANCHO Sutton is a 62 year old here for the evaluation of hypothyroidism:    1. Hypothyroidism.  Currently treated with Synthroid (GIORGI) 125 mcg daily   Ran out of Cytomel, previously taking 5 mg every day, didn't feel any different off than when she was taking it.  Has been off Cytomel x 6 months.    Past history of Graves' disease, + significantly elevated TPO antibodies consistent with Hashimoto's.    Recently received a 90-day supply of Cytomel from her mail order pharmacy.  Will obtain TSH and free T4 today.  Continue Synthroid (GIORGI) 125 mcg/day.  Plans to resume cytomel 5 mcg/day to use up current supply then discontinue as she has not really noted any benefit from the cytomel.  If today's TSH is >2.5 since taking Synthroid 125 mcg only, will plan to increase synthroid dose to 137 mcg/day once she stops cytomel.  If TSH 2.5 or less, will continue current dose of Synthroid even after stopping cytomel.    2.  Osteopenia.    Recommend daily calcium intake 1200 mg/day.    Currently eats 3-4 servings of dairy per day.  Takes no additional calcium supplements.  Currently takes vitamin D 5000 IU four days per week = average daily dose of 2850 units/day.  Continue current D dose.    Repeat DEXA scan [id number].    Labs ordered today:   No orders of the defined types were placed in this encounter.      More than 50% of the time spent with Ms. Sutton on counseling / coordinating her care.  Total face to face time was greater than or equal to 25 minutes.      Follow-up:  One year, prn sooner if needed.    Yasmine Rodriguez  NP  Endocrinology  [facility]  CC: Leland Tirado

## 2018-08-16 NOTE — LETTER
8/16/2018         RE: Yasmine Sutton  [address]  [place] [address]        Dear Colleague,    Thank you for referring your patient, Yasmine Sutton, to the [facility]. Please see a copy of my visit note below.    Name: Yasmine Sutton (Last seen 5/9/2017).  F/u for   Chief Complaint   Patient presents with     Thyroid Problem     HPI:  Yasmine Sutton is a 62 year old female who presents for the management of hypothyroidism.      Initially diagnosed with hyperthyroidism/Graves' disease in 2000 or 2001.  Was treated with oral medication with remission of the hyperthyroidism.  She subsequently became hypothyroid and was started on Synthroid.  Continued to be treated with Synthroid only until about 2 years ago when Cytomel 5 mcg daily was added due to continued symptoms of hypothyroidism.  Her insurance has changed and she is no longer covered at the clinic where she was previously seen and treated (was also taking bioidentical hormone replacement).  She had been off Cytomel and was not feeling well on Levothyroxine alone; she was c/o  +Dry skin, + weight gain-10 pounds, fatigue, brittle nails, increased hair loss, and constipation.  Cytomel was resumed and levothyroxine was changed to name brand Synthroid.    Synthroid dose was decreased 137-->125 mcg/day April 2016 due to low TSH.  She was not having any particular symptoms.  Cytomel was continued at 5 mcg daily.    Ran out of Cytomel 6 months ago - feels about the same.    She is here today for follow up.  Symptoms stable - no changes    Partial hysterectomy 12/2014.     +hot flashes and night sweats - history of partial hysterectomy.  FSH elevated consistent with menopause. Took compounded HRT x 3 years, off x2 years.  Hot flashes and night sweats previously resolved but recently noting return of hot flashes/facial flushing during the day and heat intolerance at night.  2.  Goiter.  Thyroid US showed stable goiter, no nodules.    3.   Osteopenia.  Takes vitamin D 5000 IU four days per week.  This dose was decreased from 5000 units everyday one year ago due to high-normal vitamin D level.  Eats 3-4 servings dairy per day - no additional calcium supplements.  Active, exercises regularly.   PMH/PSH:  Past Medical History:   Diagnosis Date     Abnormal Pap smear, can't excl hi gd sq intraepithelial lesion (ASC-H) 9/2014    + HPV 31     Cervical high risk HPV (human papillomavirus) test positive 11/2011, 11/2012    + HPV 31, NIL pap, '13colp:JORDIN I & II & III     Depression, major      Major depressive disorder, recurrent episode, in full remission (H) 10/20/2017     PONV (postoperative nausea and vomiting)      Toxic diffuse goiter without mention of thyrotoxic crisis or storm 1991    resolved     Past Surgical History:   Procedure Laterality Date     ABDOMEN SURGERY      Abdominoplasty     APPENDECTOMY       CYSTOSCOPY N/A 12/9/2014    Procedure: CYSTOSCOPY;  Surgeon: Joy Monterroso DO;  Location: [facility] OR     DAVINCI HYSTERECTOMY TOTAL, BILATERAL SALPINGO-OOPHORECTOMY, COMBINED Bilateral 12/9/2014    Procedure: COMBINED DAVINCI HYSTERECTOMY TOTAL, SALPINGO-OOPHORECTOMY;  Surgeon: Joy Monterroso DO;  Location: [facility] OR     HC EMBOLIZATION UTERINE FIBROID  2007     HC REMOVAL GALLBLADDER      Cholecystectomy     LEEP TX, CERVICAL  9/2013    JORDIN III     Family Hx:  Family History   Problem Relation Age of Onset     HEART DISEASE Mother      MI     Respiratory Mother      copd     Thyroid Disease Mother      hypothyroid     GASTROINTESTINAL DISEASE Mother      ulcer disease     Depression Mother      GASTROINTESTINAL DISEASE Father      ulcer disease     C.A.D. Father      MI @ 69 (smoker, EtOH)     Depression Father      Diabetes Brother      Thyroid Disease Brother      hypothyroid     Thyroid Disease Paternal Aunt      hypothyroid     Thyroid Disease Sister      Thyroid Disease Sister      Thyroid disease: Yes - mother, two sisters, one  brother         DM2: No         Autoimmune: DM1, SLE, RA, Vitiligo: Yes - brother with type 1 diabetes    Social Hx:  Social History     Social History     Marital status:      Spouse name: N/A     Number of children: 4     Years of education: N/A     Occupational History           retired     Social History Main Topics     Smoking status: Former Smoker     Packs/day: 0.50     Years: 20.00     Quit date: 1/1/2013     Smokeless tobacco: Former User      Comment: e cig for a short time     Alcohol use 3.0 oz/week     5 Standard drinks or equivalent per week      Comment: 10 drinks on the weekends     Drug use: No     Sexual activity: Yes     Birth control/ protection: Post-menopausal     Other Topics Concern      Service No     Blood Transfusions No     Caffeine Concern No     Occupational Exposure No     Hobby Hazards No     Sleep Concern No     Stress Concern No     Weight Concern No     Special Diet No     Back Care No     Exercise No     Bike Helmet No     Seat Belt Yes     Parent/Sibling W/ Cabg, Mi Or Angioplasty Before 65f 55m? 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    ROS   ROS: 10 point ROS neg other than the symptoms noted above in the HPI.    Physical Exam   VS: /54 (BP Location: Left arm, Patient Position: Chair, Cuff Size: Adult Large)  Pulse 75  Temp 97.7  F (36.5  C) (Oral)  Wt 72.9 kg (160 lb 12.8 oz)  LMP 05/08/2008  Breastfeeding? No  BMI 28.71 kg/m2  GENERAL: NAD, well dressed, answering questions appropriately, appears stated age.  HEENT: no proptosis, no lig lag, no retraction, no scleral icterus  NECK:  Supple, thyroid gland slightly enlarged, left > right, no distinct nodules, no adenopathy.  RESPIRATORY: Clear.  Normal respiratory  effort.  CARDIOVASCULAR: RRR.  No peripheral edema.  NEUROLOGY: CN grossly intact, no tremors  MSK: grossly intact, No digital cyanosis. Normal gait and station.  PSYCH: Intact judgment and insight. A&OX3 with a cordial affect.    LABS:  TFTs:  !THYROID Latest Ref Rng & Units 5/9/2017   TSH 0.40 - 4.00 mU/L 1.23   T4 FREE 0.76 - 1.46 ng/dL 1.14     Component    Latest Ref Rng 8/31/2015 3/11/2016 6/13/2016   TSH    0.40 - 4.00 mU/L 4.69 (H) 0.23 (L) 1.80   T4 Free    0.76 - 1.46 ng/dL 1.15 1.38 1.22   Free T3    2.3 - 4.2 pg/mL 2.2 (L)       TG/TPO:  Component    Latest Ref Rng 4/5/2001 1/6/2009   Thyroid Peroxidase Antibody    <35 IU/mL  99219 (H)   Thyroid Peroxidase Antibody    <2 UNITS/ML >70 H    Thyroglobulin Antibody    <2 UNITS/ML 14 (H)      Component    Latest Ref Rng 8/31/2015   FSH     88.8     Component    Latest Ref Rng 8/31/2015   Vitamin D Deficiency screening    20 - 75 ug/L 32     ULTRASOUND THYROID 9/9/2015    HISTORY: Unspecified hypothyroidism.  FINDINGS: Thyroid ultrasound demonstrates an enlarged thyroid gland.  The right lobe measures 5.1 x 3.0 x 2.5 cm. The left lobe measures 6.3  x 2.2 x 2.8 cm. The isthmus is normal in thickness. Thyroid parenchyma  is heterogeneous in echotexture. Increased color Doppler flow is  noted within the gland.  Thyroid nodules as follows:   Right Lobe: None.  Isthmus: None.  Left Lobe: None.  IMPRESSION  IMPRESSION: Enlarged thyroid gland with heterogeneous echotexture. No  discrete thyroid nodules are appreciated. Increased color Doppler  flow is noted throughout the gland, likely indicating underlying  Thyroiditis.    DEXA 9/8/2015  FINDINGS:  Lumbar Spine (L1-L4) T-score: 1.4  Left Femoral Neck T-score: -0.4  Right Femoral Neck T-score: -1.6    Lumbar (L1-L4) BMD: 1.373 Previous: 1.530   Total Hip Mean BMD: 0.961 Previous: 1.075    Comparison is made to another DXA performed on a different [employer] machine on 12/27/2006.       IMPRESSION  Osteopenia (low bone  mass)  Degenerative changes of the spine  Recommendations include ensuring adequate daily Calcium and Vitamin D intake  Follow up scan can be considered in three to five years.    Comparisons from different scanners that have not been cross calibrated, are not necessarily valid. Such a comparison has been performed here; one should interpret with caution.  Compared to previous bone densitometry performed on this patient, there is the suggestion of a possible trend towards worsening of the lumbar spine, and a possible trend towards worsening of the total hip.    All pertinent notes, labs, and images personally reviewed by me.     A/P  Ms.Linda PANCHO Sutton is a 62 year old here for the evaluation of hypothyroidism:    1. Hypothyroidism.  Currently treated with Synthroid (GIORGI) 125 mcg daily   Ran out of Cytomel, previously taking 5 mg every day, didn't feel any different off than when she was taking it.  Has been off Cytomel x 6 months.    Past history of Graves' disease, + significantly elevated TPO antibodies consistent with Hashimoto's.    Recently received a 90-day supply of Cytomel from her mail order pharmacy.  Will obtain TSH and free T4 today.  Continue Synthroid (GIORGI) 125 mcg/day.  Plans to resume cytomel 5 mcg/day to use up current supply then discontinue as she has not really noted any benefit from the cytomel.  If today's TSH is >2.5 since taking Synthroid 125 mcg only, will plan to increase synthroid dose to 137 mcg/day once she stops cytomel.  If TSH 2.5 or less, will continue current dose of Synthroid even after stopping cytomel.    2.  Osteopenia.    Recommend daily calcium intake 1200 mg/day.    Currently eats 3-4 servings of dairy per day.  Takes no additional calcium supplements.  Currently takes vitamin D 5000 IU four days per week = average daily dose of 2850 units/day.  Continue current D dose.    Repeat DEXA scan [id number].    Labs ordered today:   No orders of the defined types were placed in this  encounter.      More than 50% of the time spent with Ms. Sutton on counseling / coordinating her care.  Total face to face time was greater than or equal to 25 minutes.      Follow-up:  One year, prn sooner if needed.    Yasmine Rodriguez NP  Endocrinology  [facility]  CC: Leland Tirado      Again, thank you for allowing me to participate in the care of your patient.        Sincerely,        WYATT Molina CNP

## 2018-08-19 NOTE — PROGRESS NOTES
Yasmine,  Your TSH is a little elevated off the cytomel.  I think we will need to increase your synthroid back to 137 mcg/day once you use up your cytomel.  I'll place a new prescription once you are ready to stop the cytomel - just call and remind me when you are ready to change to the new dose.  Yasmine Rodriguez, LALA  Endocrinology

## 2018-10-02 ENCOUNTER — OFFICE VISIT (OUTPATIENT)
Dept: OBGYN | Facility: CLINIC | Age: 63
End: 2018-10-02
Payer: COMMERCIAL

## 2018-10-02 VITALS
WEIGHT: 161 LBS | HEIGHT: 63 IN | BODY MASS INDEX: 28.53 KG/M2 | DIASTOLIC BLOOD PRESSURE: 60 MMHG | SYSTOLIC BLOOD PRESSURE: 116 MMHG

## 2018-10-02 DIAGNOSIS — Z00.00 ENCOUNTER FOR ROUTINE ADULT HEALTH EXAMINATION WITHOUT ABNORMAL FINDINGS: Primary | ICD-10-CM

## 2018-10-02 PROCEDURE — 99396 PREV VISIT EST AGE 40-64: CPT | Performed by: FAMILY MEDICINE

## 2018-10-02 ASSESSMENT — ENCOUNTER SYMPTOMS
NAIL CHANGES: 1
EYE WATERING: 0
EYE PAIN: 0
EYE REDNESS: 0
POOR WOUND HEALING: 0
EYE IRRITATION: 0
DOUBLE VISION: 0
SKIN CHANGES: 0

## 2018-10-02 NOTE — MR AVS SNAPSHOT
After Visit Summary   10/2/2018    Yasmine Sutton    MRN: [id number]           Patient Information     Date Of Birth          1955        Visit Information        Provider Department      10/2/2018 1:30 PM Joy Monterroso, DO [facility]        Today's Diagnoses     Encounter for routine adult health examination without abnormal findings    -  1      Care Instructions    Call Lab [phone] [place] or [phone] [place] to schedule future labs. You may schedule   The labs at any [place] facitily.  If you are getting cholesterol checked please fast 8 hours '    Return yearly     [facility] phone number [phone]    Dr. Joy Monterroso, DO    Obstetrics and Gynecology  [facility] [place] [facility] [place]                       Follow-ups after your visit        Future tests that were ordered for you today     Open Future Orders        Priority Expected Expires Ordered    *MA Screening Digital Bilateral Routine  10/2/2019 10/2/2018    CBC with platelets Routine  10/2/2019 10/2/2018    Comprehensive metabolic panel Routine  10/2/2019 10/2/2018    Lipid panel reflex to direct LDL Fasting Routine  10/2/2019 10/2/2018    TSH with free T4 reflex Routine  10/2/2019 10/2/2018            Who to contact     If you have questions or need follow up information about today's clinic visit or your schedule please contact [facility] directly at [phone].  Normal or non-critical lab and imaging results will be communicated to you by MyChart, letter or phone within 4 business days after the clinic has received the results. If you do not hear from us within 7 days, please contact the clinic through MyChart or phone. If you have a critical or abnormal lab result, we will notify you by phone as soon as possible.  Submit refill requests through [employer] or call your pharmacy and they will forward the refill request to us. Please allow 3 business days for  "your refill to be completed.          Additional Information About Your Visit        MyChart Information     [employer] gives you secure access to your electronic health record. If you see a primary care provider, you can also send messages to your care team and make appointments. If you have questions, please call your primary care clinic.  If you do not have a primary care provider, please call [phone] and they will assist you.        Care EveryWhere ID     This is your Care EveryWhere ID. This could be used by other organizations to access your [place] medical records  [id number]        Your Vitals Were     Height Last Period BMI (Body Mass Index)             5' 2.75\" (1.594 m) 05/08/2008 28.75 kg/m2          Blood Pressure from Last 3 Encounters:   10/02/18 116/60   08/16/18 104/54   10/20/17 110/70    Weight from Last 3 Encounters:   10/02/18 161 lb (73 kg)   08/16/18 160 lb 12.8 oz (72.9 kg)   10/20/17 169 lb (76.7 kg)               Primary Care Provider Office Phone # Fax #    Yocasta Ann Aaseby-Aguilera, PA-C [phone] [address] [id number]        Equal Access to Services     ZAHRA CARLISLE AH: Hadii aad ku hadasho Soomaali, waaxda luqadaha, qaybta kaalmada adeegyada, waxay idiin haykharin adeeg kharaolegario la'malcom ah. So [facility] [phone].    ATENCIÓN: Si habla español, tiene a lala disposición servicios gratuitos de asistencia lingüística. ame al [phone].    We comply with applicable federal civil rights laws and [place] laws. We do not discriminate on the basis of race, color, national origin, age, disability, sex, sexual orientation, or gender identity.            Thank you!     Thank you for choosing [facility]  for your care. Our goal is always to provide you with excellent care. Hearing back from our patients is one way we can continue to improve our services. Please take a few minutes to complete the written survey that you may receive in the mail " after your visit with us. Thank you!             Your Updated Medication List - Protect others around you: Learn how to safely use, store and throw away your medicines at www.disposemymeds.org.          This list is accurate as of 10/2/18  1:51 PM.  Always use your most recent med list.                   Brand Name Dispense Instructions for use Diagnosis    buPROPion 150 MG 24 hr tablet    WELLBUTRIN XL    90 tablet    Take 1 tablet (150 mg) by mouth daily    Major depressive disorder, recurrent episode, in full remission (H)       cholecalciferol 5000 units Tabs tablet    vitamin D3     Take 5,000 Units by mouth daily        MAGNESIUM OXIDE PO      Take 400 mg by mouth daily    Unspecified hypothyroidism       PROBIOTIC DAILY PO           * SYNTHROID 125 MCG tablet   Generic drug:  levothyroxine     90 tablet    Take 1 tablet (125 mcg) by mouth daily Dispense name brand only, no generic    Hypothyroidism due to acquired atrophy of thyroid       * SYNTHROID 125 MCG tablet   Generic drug:  levothyroxine     90 tablet    Take 1 tablet (125 mcg) by mouth daily Dispense name brand Synthroid, no generic    Hypothyroidism due to acquired atrophy of thyroid       * Notice:  This list has 2 medication(s) that are the same as other medications prescribed for you. Read the directions carefully, and ask your doctor or other care provider to review them with you.

## 2018-10-02 NOTE — NURSING NOTE
"Chief Complaint   Patient presents with     Gyn Exam       Initial /60  Ht 5' 2.75\" (1.594 m)  Wt 161 lb (73 kg)  LMP 2008  BMI 28.75 kg/m2 Estimated body mass index is 28.75 kg/(m^2) as calculated from the following:    Height as of this encounter: 5' 2.75\" (1.594 m).    Weight as of this encounter: 161 lb (73 kg).  BP completed using cuff size: regular        The following HM Due: NONE      "

## 2018-10-02 NOTE — PATIENT INSTRUCTIONS
Call Lab [phone] [place] or [phone] [place] to schedule future labs. You may schedule   The labs at any [place] facitily.  If you are getting cholesterol checked please fast 8 hours '    Return yearly     [facility] phone number [phone]    Dr. Joy Monterroso, DO    Obstetrics and Gynecology  [facility] [place] [facility] [place]

## 2018-10-02 NOTE — PROGRESS NOTES
SUBJECTIVE:  Yasmine Sutton is an 62 year old  postmenopausal woman   who presents for annual gyn exam. Menopause at age 58. No   bleeding, spotting, or discharge noted.     Estrogen replacement therapy: never  LES exposure: no  History of abnormal Pap smear: Yes   11: NIL pap with + HPV 31.  Plan dx pap & HPV in 1 yr.   12: NIL pap, + HPV 31. 3/26/13 colp.JORDIN I, II & III Plan leep   13: LEEP - JORDIN III, essentially neg margins per MD. Plan pap at 6 & 12  months. In reminders   3/10/14: NIL pap. Plan cotest in 6 months.   14: ASC H pap, HPV 31. Plan colp. Pt desires hysterectomy. Visit  planned.Tracking updated.   14: complete hysterectomy, cx removed. No residual dysplasia, no  malignancy. Per guidelines no further paps needed.  Family history of uterine or ovarian cancer: No  Regular self breast exam: No  History of abnormal mammogram: No  Family history of breast cancer: No  History of abnormal lipids: No      - Pt is doing well, expresses no concerns today. Her & her  moved to [place], [place] this past  -- will transfer care to [facility] in [place], [place].       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 Father      MI @ 69  (smoker, EtOH)     Depression Father      Diabetes Brother      Thyroid Disease Brother      hypothyroid     Thyroid Disease Paternal Aunt      hypothyroid     Thyroid Disease Sister      Thyroid Disease Sister        Past Surgical History:   Procedure Laterality Date     ABDOMEN SURGERY      Abdominoplasty     APPENDECTOMY       CYSTOSCOPY N/A 12/9/2014    Procedure: CYSTOSCOPY;  Surgeon: Joy Monterroso DO;  Location: [facility] OR     DAVINCI HYSTERECTOMY TOTAL, BILATERAL SALPINGO-OOPHORECTOMY, COMBINED Bilateral 12/9/2014    Procedure: COMBINED DAVINCI HYSTERECTOMY TOTAL, SALPINGO-OOPHORECTOMY;  Surgeon: Joy Monterroso DO;  Location: [facility] OR     HC EMBOLIZATION UTERINE FIBROID  2007     HC REMOVAL GALLBLADDER      Cholecystectomy     LEEP TX, CERVICAL  9/2013    JORDIN III       Current Outpatient Prescriptions   Medication     buPROPion (WELLBUTRIN XL) 150 MG 24 hr tablet     cholecalciferol (VITAMIN D3) 5000 UNITS TABS tablet     MAGNESIUM OXIDE PO     Probiotic Product (PROBIOTIC DAILY PO)     SYNTHROID 125 MCG tablet     SYNTHROID 125 MCG tablet     No current facility-administered medications for this visit.      Allergies   Allergen Reactions     No Known Drug Allergies        Social History   Substance Use Topics     Smoking status: Former Smoker     Packs/day: 0.50     Years: 20.00     Quit date: 1/1/2013     Smokeless tobacco: Former User      Comment: e cig for a short time     Alcohol use 3.0 oz/week     5 Standard drinks or equivalent per week      Comment: 10 drinks on the weekends       Review Of Systems  Ears/Nose/Throat: negative  Respiratory: No shortness of breath, dyspnea on exertion, cough, or hemoptysis  Cardiovascular: negative  Gastrointestinal: negative  Genitourinary: negative  Constitutional, HEENT, cardiovascular, pulmonary, GI, , musculoskeletal, neuro, skin, endocrine and psych systems are negative, except as otherwise noted.      This document serves as a record of the services  "and decisions personally performed and made by Joy Monterroso DO. It was created on her behalf by Meryl North, a trained medical scribe. The creation of this document is based the provider's statements to the medical scribe.  Scrmychal North 1:45 PM, 2018    OBJECTIVE:  /60  Ht 1.594 m (5' 2.75\")  Wt 73 kg (161 lb)  LMP 2008  BMI 28.75 kg/m2  General appearance: healthy, alert and no distress  Skin: Skin color, texture, turgor normal. No rashes or lesions.  Ears: negative  Nose/Sinuses: Nares normal. Septum midline. Mucosa normal. No drainage or sinus tenderness.  Oropharynx: Lips, mucosa, and tongue normal. Teeth and gums normal.  Neck: Neck supple. No adenopathy. Thyroid symmetric, normal size,, Carotids without bruits.  Lungs: negative, Percussion normal. Good diaphragmatic excursion. Lungs clear  Heart: negative, PMI normal. No lifts, heaves, or thrills. RRR. No murmurs, clicks gallops or rub  Breasts: Inspection negative. No nipple discharge or bleeding. No masses.  Abdomen: Abdomen soft, non-tender. BS normal. No masses, organomegaly  Pelvic: Pelvic examination without pap/ Gonorrhea and Chlamydia   including  External genitalia normal   and vagina normal rugatted not atrophic  Examination of urethra  normal no masses, tenderness, scarring  bladder, no masses or tenderness  Bimanual exam with no tenderness  Adnexa/parametrium normal  Cervix/uterus surgically absent           ASSESSMENT:  Yasmine Sutton is an 62 year old  postmenopausal woman   who presents for annual gyn exam.     PLAN:  Dx: Annual gyn physical  1)  Will return for fasting labs only visit; Mammogram ordered; Pap smear not indicated due to Hysterectomy  2)  Return to clinic in 1 year for annual gyn physical, otherwise as needed.      Rx:  None      PE:  Reviewed health maintenance including diet, regular exercise,   estrogen replacement and periodic exams.        The information in this document, created by the " medical scribe for me, accurately reflects the services I personally performed and the decisions made by me. I have reviewed and approved this document for accuracy prior to leaving the patient care area.  1:45 PM, 10/02/18    Dr. Joy Monterroso, DO    Obstetrics and Gynecology  [facility] [place]

## 2018-10-12 DIAGNOSIS — Z86.39 H/O GRAVES' DISEASE: ICD-10-CM

## 2018-10-12 DIAGNOSIS — N95.1 MENOPAUSAL SYNDROME (HOT FLASHES): ICD-10-CM

## 2018-10-13 NOTE — TELEPHONE ENCOUNTER
Routing refill request to provider for review/approval because:  Drug not active on patient's medication list  Labs not current:  TSH  Bao Cheng RN, BSN

## 2018-10-13 NOTE — TELEPHONE ENCOUNTER
"Requested Prescriptions   Pending Prescriptions Disp Refills     liothyronine (CYTOMEL) 5 MCG tablet [Pharmacy Med Name: LIOTHYRONINE SOD TABS 5MCG]  Last Written Prescription Date:  8/14/2018  Last Fill Quantity: 30 tablet,  # refills: 0   Last office visit: 8/16/2018 with prescribing provider:  Vesna      30 tablet 0     Sig: TAKE ONE-HALF (1/2) TABLET DAILY    Thyroid Protocol Failed    10/12/2018 11:34 PM       Failed - Normal TSH on file in past 12 months    Recent Labs   Lab Test  08/16/18   1145   TSH  4.28*             Passed - Patient is 12 years or older       Passed - Recent (12 mo) or future (30 days) visit within the authorizing provider's specialty    Patient had office visit in the last 12 months or has a visit in the next 30 days with authorizing provider or within the authorizing provider's specialty.  See \"Patient Info\" tab in inbasket, or \"Choose Columns\" in Meds & Orders section of the refill encounter.           Passed - No active pregnancy on record    If patient is pregnant or has had a positive pregnancy test, please check TSH.         Passed - No positive pregnancy test in past 12 months    If patient is pregnant or has had a positive pregnancy test, please check TSH.            "

## 2018-10-17 NOTE — TELEPHONE ENCOUNTER
Please call Yasmine and verify that she decided to continue taking the cytomel and if so, please verify her dose - 1/2 tablets daily or 1 tab daily.  Also let her know she's due to recheck labs as the last TSH was a little elevated.  She can just make a lab appointment for that.  Message me back with the above info so I can send the Rx to her pharmacy.  Thanks,  Yasmine Rodriguez NP  Endocrinology

## 2018-11-08 ENCOUNTER — TELEPHONE (OUTPATIENT)
Dept: FAMILY MEDICINE | Facility: CLINIC | Age: 63
End: 2018-11-08

## 2018-11-08 DIAGNOSIS — E03.4 HYPOTHYROIDISM DUE TO ACQUIRED ATROPHY OF THYROID: Primary | ICD-10-CM

## 2018-11-08 DIAGNOSIS — F33.42 MAJOR DEPRESSIVE DISORDER, RECURRENT EPISODE, IN FULL REMISSION (H): ICD-10-CM

## 2018-11-08 RX ORDER — BUPROPION HYDROCHLORIDE 150 MG/1
TABLET ORAL
Qty: 30 TABLET | Refills: 0 | OUTPATIENT
Start: 2018-11-08

## 2018-11-08 RX ORDER — LEVOTHYROXINE SODIUM 137 MCG
137 TABLET ORAL DAILY
Qty: 90 TABLET | Refills: 1 | Status: SHIPPED | OUTPATIENT
Start: 2018-11-08 | End: 2019-04-18

## 2018-11-08 NOTE — TELEPHONE ENCOUNTER
Yasmine Rodriguez changed dosage and is filling meds for patient right now       Rx being sent to [facility] / Patient notified     Katlin Davenport/MYRNA

## 2018-11-08 NOTE — TELEPHONE ENCOUNTER
Patient calling for a refill of Synthroid to [facility]. Asked patient to call pharmacy for refill, she says she did but have not heard back. Patient also says there was a dose change to 137mg. Please verify and refill. Please call patient if questions. Ruth Behrens.

## 2018-11-08 NOTE — TELEPHONE ENCOUNTER
Patient no longer taking cytomel.  Rx for Synthroid (GIORGI) 137 mcg/day sent to her pharmacy as requested.  Yasmine Rodriguez NP  Endocrinology

## 2018-11-08 NOTE — TELEPHONE ENCOUNTER
Pending Prescriptions:                       Disp   Refills    buPROPion (WELLBUTRIN XL) 150 MG 24 hr ta*30 tab*0            Sig: TAKE 1 TABLET BY MOUTH ONCE DAILY    Pt due for a yearly ov  Please fax or deny pt is under a new provider(see above)   Kristy  [phone] (home)

## 2018-11-12 NOTE — TELEPHONE ENCOUNTER
Spoke with patient.  Patient states at her August visit with LS it was discussed that she would continue on the Cytomel 5 mcg daily and Synthroid 125 mcg until the Cytomel ran out.  Once she completed the Cytomel, she would not refill this and would switch to Synthroid 137 mcg.  Patient thinks this refill request for the Cytomel is on a auto refill and does not need this.  Patient states her current Synthroid is going to run out before the Cytomel does.  She has #26 tablets left of the Synthroid 125 mcg and once this is gone, will discontinue the Cytomel and will switch to the Synthroid 137 mcg which she already has the prescription for.   Based on this, patient is wondering when she would really need to recheck her labs.  Feels rechecking now would not be ideal as she has yet to make the medication changes that were discussed at her August visit.   Patient is currently in [place] for the winter but will be back for a visit at [place] time.  If necessary, she would check labs then but doesn't feel this would be needed given her current medication dosing.   Does have a clinic follow up set for April 2019 when she returns from [place].  Patient informed Yasmine Rodriguez is next in the office on Wednesday afternoon.  Please advise.        Called [facility] and cancelled refill request.  Per [facility], this refill request was already cancelled on 10/13/18.  Theodora Kern M.A.

## 2018-11-16 RX ORDER — LIOTHYRONINE SODIUM 5 UG/1
TABLET ORAL
Status: SHIPPED
Start: 2018-11-16 | End: 2019-04-18

## 2018-11-16 NOTE — TELEPHONE ENCOUNTER
Note to pharmacy to cancel request.  Med dc'd.  RESHMA Wh[place], Yasmine Vang, APRN CNP        11/8/18 3:09 PM   Note      Patient no longer taking cytomel.  Rx for Synthroid (GIORGI) 137 mcg/day sent to her pharmacy as requested.  Yasmine Rodriguez NP  Endocrinology

## 2018-11-16 NOTE — TELEPHONE ENCOUNTER
See phone note dated 11/08/18 for further documentation.  Rx was faxed.  Patient was notified.  Theodora Kern M.A.

## 2018-12-10 DIAGNOSIS — F33.42 MAJOR DEPRESSIVE DISORDER, RECURRENT EPISODE, IN FULL REMISSION (H): ICD-10-CM

## 2018-12-11 RX ORDER — BUPROPION HYDROCHLORIDE 150 MG/1
TABLET ORAL
Qty: 90 TABLET | Refills: 3 | OUTPATIENT
Start: 2018-12-11

## 2018-12-17 RX ORDER — BUPROPION HYDROCHLORIDE 150 MG/1
150 TABLET ORAL DAILY
Qty: 20 TABLET | Refills: 0 | Status: SHIPPED | OUTPATIENT
Start: 2018-12-17 | End: 2019-01-04 | Stop reason: DRUGHIGH

## 2018-12-17 NOTE — TELEPHONE ENCOUNTER
Patient called in and stated that she did make an appointment with Dr. Boles for 1/7/19 and is out of medication at this point. She has not been seeing Dr. Aaseby-Aguilera either in awhile and has only seen Dr. Boles about her buproprion medication and wants to continue with that. She understands that she has not been seen in over a year but has had a lot of things going on lately. Her mother just passed away and she has been trying to sell her house and close on it along with her house and trying to get ready for the holidays. I asked her if she was able to come in any sooner then 1/7/19 but she states that with everything going on and with Dr. Boles's availability, the 7th was the soonest she could get in. If anything changes, she will call to schedule sooner but is hoping that Dr. Boles could send in enough medication to get her until her scheduled appointment which she will for sure be at. Routing to Dr. Boles for review.

## 2019-01-04 ENCOUNTER — OFFICE VISIT (OUTPATIENT)
Dept: FAMILY MEDICINE | Facility: CLINIC | Age: 64
End: 2019-01-04
Payer: COMMERCIAL

## 2019-01-04 VITALS
OXYGEN SATURATION: 96 % | WEIGHT: 169.2 LBS | BODY MASS INDEX: 29.98 KG/M2 | DIASTOLIC BLOOD PRESSURE: 78 MMHG | SYSTOLIC BLOOD PRESSURE: 130 MMHG | HEIGHT: 63 IN | HEART RATE: 64 BPM | TEMPERATURE: 97.7 F

## 2019-01-04 DIAGNOSIS — F33.42 MAJOR DEPRESSIVE DISORDER, RECURRENT EPISODE, IN FULL REMISSION (H): Primary | ICD-10-CM

## 2019-01-04 PROCEDURE — 99213 OFFICE O/P EST LOW 20 MIN: CPT | Performed by: PHYSICIAN ASSISTANT

## 2019-01-04 RX ORDER — BUPROPION HYDROCHLORIDE 75 MG/1
75 TABLET ORAL 2 TIMES DAILY
Qty: 180 TABLET | Refills: 1 | Status: SHIPPED | OUTPATIENT
Start: 2019-01-04 | End: 2019-04-18

## 2019-01-04 ASSESSMENT — ENCOUNTER SYMPTOMS
NAUSEA: 0
PALPITATIONS: 0
NERVOUS/ANXIOUS: 0
HEARTBURN: 0
HEMATURIA: 0
JOINT SWELLING: 1
SHORTNESS OF BREATH: 0
CHILLS: 0
FEVER: 0
HEADACHES: 0
FREQUENCY: 0
WEAKNESS: 0
BREAST MASS: 0
CONSTIPATION: 0
DYSURIA: 0
EYE PAIN: 0
HEMATOCHEZIA: 0
ABDOMINAL PAIN: 0
ARTHRALGIAS: 1
DIARRHEA: 0
DIZZINESS: 0
PARESTHESIAS: 0
COUGH: 0
MYALGIAS: 0
SORE THROAT: 0

## 2019-01-04 ASSESSMENT — MIFFLIN-ST. JEOR: SCORE: 1292.65

## 2019-01-04 ASSESSMENT — PATIENT HEALTH QUESTIONNAIRE - PHQ9: SUM OF ALL RESPONSES TO PHQ QUESTIONS 1-9: 7

## 2019-01-04 NOTE — PROGRESS NOTES
SUBJECTIVE:   CC: Yasmine Sutton is an 63 year old woman who presents for preventive health visit.     Physical   Annual:     Getting at least 3 servings of Calcium per day:  Yes    Bi-annual eye exam:  Yes    Dental care twice a year:  Yes    Sleep apnea or symptoms of sleep apnea:  None    Diet:  Other    Frequency of exercise:  None    Taking medications regularly:  Yes    Medication side effects:  None    Additional concerns today:  No    PHQ-2 Total Score: 1              Today's PHQ-2 Score:   PHQ-2 (  [employer]) 2019   Q1: Little interest or pleasure in doing things 0   Q2: Feeling down, depressed or hopeless 1   PHQ-2 Score 1   Q1: Little interest or pleasure in doing things Not at all   Q2: Feeling down, depressed or hopeless Several days   PHQ-2 Score 1       Abuse: Current or Past(Physical, Sexual or Emotional)- No  Do you feel safe in your environment? Yes    Social History     Tobacco Use     Smoking status: Former Smoker     Packs/day: 0.50     Years: 20.00     Pack years: 10.00     Last attempt to quit: 2013     Years since quittin.0     Smokeless tobacco: Former User     Tobacco comment: e cig for a short time   Substance Use Topics     Alcohol use: Yes     Alcohol/week: 3.0 oz     Types: 5 Standard drinks or equivalent per week     Comment: 10 drinks on the weekends     Alcohol Use 2019   If you drink alcohol do you typically have greater than 3 drinks per day OR greater than 7 drinks per week? Yes   AUDIT SCORE  5     AUDIT - Alcohol Use Disorders Identification Test - Reproduced from the World Health Organization Audit 2001 (Second Edition) 2019   1.  How often do you have a drink containing alcohol? 2 to 4 times a month   2.  How many drinks containing alcohol do you have on a typical day when you are drinking? 3 or 4   3.  How often do you have five or more drinks on one occasion? Less than monthly   4.  How often during the last year have you found that you were not able  "to stop drinking once you had started? Less than monthly   5.  How often during the last year have you failed to do what was normally expected of you because of drinking? Never   6.  How often during the last year have you needed a first drink in the morning to get yourself going after a heavy drinking session? Never   7.  How often during the last year have you had a feeling of guilt or remorse after drinking? Never   8.  How often during the last year have you been unable to remember what happened the night before because of your drinking? Never   9.  Have you or someone else been injured because of your drinking? No   10. Has a relative, friend, doctor or other health care worker been concerned about your drinking or suggested you cut down? No   TOTAL SCORE 5       Reviewed orders with patient.  Reviewed health maintenance and updated orders accordingly - Yes  {Chronicprobdata (Optional):009993}    {Mammo Decision Support (Optional):424307}    Pertinent mammograms are reviewed under the imaging tab.  History of abnormal Pap smear: {PAP HX:741433}  PAP / HPV 9/22/2014 3/10/2014 11/27/2012   PAP ASC-H(A) NIL NIL   PAP DATE - [facility] - - -     Reviewed and updated as needed this visit by clinical staff         Reviewed and updated as needed this visit by Provider        {HISTORY OPTIONS (Optional):927654}    Review of Systems  {FEMALE ROS (Optional):937367}     OBJECTIVE:   LMP 05/08/2008   Physical Exam  {Exam Choices (Optional):322248}    {Diagnostic Test Results (Optional):278188::\"Diagnostic Test Results:\",\"none \"}    ASSESSMENT/PLAN:   {Diag Picklist:257013}    COUNSELING:  {FEMALE COUNSELING MESSAGES:779775::\"Reviewed preventive health counseling, as reflected in patient instructions\"}    BP Readings from Last 1 Encounters:   10/02/18 116/60     Estimated body mass index is 28.75 kg/m  as calculated from the following:    Height as of 10/2/18: 1.594 m (5' 2.75\").    Weight as of 10/2/18: 73 kg (161 lb).    {BP " Counseling- Complete if BP >= 120/80  (Optional):827989}  {Weight Management Plan (ACO) Complete if BMI is abnormal-  Ages 18-64  BMI >24.9.  Age 65+ with BMI <23 or >30 (Optional):023532}     reports that she quit smoking about 6 years ago. She has a 10.00 pack-year smoking history. She has quit using smokeless tobacco.  {Tobacco Cessation -- Complete if patient is a smoker (Optional):056685}    Counseling Resources:  ATP IV Guidelines  Pooled Cohorts Equation Calculator  Breast Cancer Risk Calculator  FRAX Risk Assessment  ICSI Preventive Guidelines  Dietary Guidelines for Americans, 2010  USDA's MyPlate  ASA Prophylaxis  Lung CA Screening    Ramona Ann Aaseby-Aguilera, PA-C  [facility]

## 2019-01-04 NOTE — PROGRESS NOTES
"   SUBJECTIVE:   CC: Yasmine Sutton is an 63 year old woman who presents for preventive health visit.     Physical     {Add if <65 person on Medicare  - Required Questions (Optional):406449}  {Outside tests to abstract? :676323}    {additional problems to add (Optional):514223}    Today's PHQ-2 Score:   PHQ-2 (  [employer]) 2019   Q1: Little interest or pleasure in doing things -   Q2: Feeling down, depressed or hopeless -   PHQ-2 Score -   Q1: Little interest or pleasure in doing things -   Q2: Feeling down, depressed or hopeless -   PHQ-2 Score Incomplete       Abuse: Current or Past(Physical, Sexual or Emotional)- {YES/NO/NA:173404}  Do you feel safe in your environment? {YES/NO/NA:922930}    Social History     Tobacco Use     Smoking status: Former Smoker     Packs/day: 0.50     Years: 20.00     Pack years: 10.00     Last attempt to quit: 2013     Years since quittin.0     Smokeless tobacco: Former User     Tobacco comment: e cig for a short time   Substance Use Topics     Alcohol use: Yes     Alcohol/week: 3.0 oz     Types: 5 Standard drinks or equivalent per week     Comment: 10 drinks on the weekends     No flowsheet data found.{add AUDIT responses (Optional) (A score of 7 for adult men is an indication of hazardous drinking; a score of 8 or more is an indication of an alcohol use disorder.  A score of 7 or more for adult women is an indication of hazardous drinking or an alchohol use disorder):182885}    Reviewed orders with patient.  Reviewed health maintenance and updated orders accordingly - {Yes/No:206806::\"Yes\"}  {Chronicprobdata (Optional):410253}    {Mammo Decision Support (Optional):962006}    Pertinent mammograms are reviewed under the imaging tab.  History of abnormal Pap smear: {PAP HX:834171}  PAP / HPV 2014 3/10/2014 2012   PAP ASC-H(A) NIL NIL   PAP DATE - [facility] - - -     Reviewed and updated as needed this visit by clinical staff         Reviewed and updated as needed " "this visit by Provider        {HISTORY OPTIONS (Optional):296796}    Review of Systems  {FEMALE ROS (Optional):631684}     OBJECTIVE:   LMP 05/08/2008   Physical Exam  {Exam Choices (Optional):315398}    {Diagnostic Test Results (Optional):438504::\"Diagnostic Test Results:\",\"none \"}    ASSESSMENT/PLAN:   {Diag Picklist:769836}    COUNSELING:  {FEMALE COUNSELING MESSAGES:163774::\"Reviewed preventive health counseling, as reflected in patient instructions\"}    BP Readings from Last 1 Encounters:   10/02/18 116/60     Estimated body mass index is 28.75 kg/m  as calculated from the following:    Height as of 10/2/18: 1.594 m (5' 2.75\").    Weight as of 10/2/18: 73 kg (161 lb).    {BP Counseling- Complete if BP >= 120/80  (Optional):372958}  {Weight Management Plan (ACO) Complete if BMI is abnormal-  Ages 18-64  BMI >24.9.  Age 65+ with BMI <23 or >30 (Optional):759508}     reports that she quit smoking about 6 years ago. She has a 10.00 pack-year smoking history. She has quit using smokeless tobacco.  {Tobacco Cessation -- Complete if patient is a smoker (Optional):348395}    Counseling Resources:  ATP IV Guidelines  Pooled Cohorts Equation Calculator  Breast Cancer Risk Calculator  FRAX Risk Assessment  ICSI Preventive Guidelines  Dietary Guidelines for Americans, 2010  USDA's MyPlate  ASA Prophylaxis  Lung CA Screening    Ramona Ann Aaseby-Aguilera, PA-C  [facility]  "

## 2019-01-04 NOTE — PROGRESS NOTES
"  SUBJECTIVE:   Yasmine Sutton is a 63 year old female who presents to clinic today for the following health issues:      Here for refill on wellbutrin.  Has been on Wellbutrin extended release 150 for close to 20 years.  She states she feels like she is doing well and no longer needs and wondering if she could wean at this.  She is going to [place] for 3 months leaving next week.            Problem list and histories reviewed & adjusted, as indicated.  Additional history: as documented    Current Outpatient Medications   Medication Sig Dispense Refill     buPROPion (WELLBUTRIN) 75 MG tablet Take 1 tablet (75 mg) by mouth 2 times daily 180 tablet 1     cholecalciferol (VITAMIN D3) 5000 UNITS TABS tablet Take 5,000 Units by mouth four times a week        MAGNESIUM OXIDE PO Take 400 mg by mouth daily        SYNTHROID 137 MCG tablet Take 1 tablet (137 mcg) by mouth daily Dispense name brand Synthroid, no generics 90 tablet 1     liothyronine (CYTOMEL) 5 MCG tablet TAKE ONE-HALF (1/2) TABLET DAILY (Patient not taking: Reported on 1/4/2019)       Probiotic Product (PROBIOTIC DAILY PO)        BP Readings from Last 3 Encounters:   01/04/19 130/78   10/02/18 116/60   08/16/18 104/54    Wt Readings from Last 3 Encounters:   01/04/19 76.7 kg (169 lb 3.2 oz)   10/02/18 73 kg (161 lb)   08/16/18 72.9 kg (160 lb 12.8 oz)                    Reviewed and updated as needed this visit by clinical staff  Tobacco  Allergies  Meds  Med Hx  Surg Hx  Fam Hx  Soc Hx      Reviewed and updated as needed this visit by Provider         ROS:  Constitutional, HEENT, cardiovascular, pulmonary, gi and gu systems are negative, except as otherwise noted.    OBJECTIVE:                                                    /78 (BP Location: Right arm, Patient Position: Chair, Cuff Size: Adult Regular)   Pulse 64   Temp 97.7  F (36.5  C) (Oral)   Ht 1.594 m (5' 2.75\")   Wt 76.7 kg (169 lb 3.2 oz)   LMP 05/08/2008   SpO2 96%   BMI 30.21 " kg/m    Body mass index is 30.21 kg/m .  GENERAL APPEARANCE: healthy, alert and no distress  RESP: lungs clear to auscultation - no rales, rhonchi or wheezes  CV: regular rates and rhythm, normal S1 S2, no S3 or S4 and no murmur, click or rub  MENTAL STATUS EXAM:  Appearance/Behavior: No apparent distress and Neatly groomed  Speech: Normal  Mood/Affect: normal affect  Insight: Adequate         ASSESSMENT/PLAN:                                                    1. Major depressive disorder, recurrent episode, in full remission (H)    - buPROPion (WELLBUTRIN) 75 MG tablet; Take 1 tablet (75 mg) by mouth 2 times daily  Dispense: 180 tablet; Refill: 1      Patient Instructions   (F33.42) Major depressive disorder, recurrent episode, in full remission (H)  (primary encounter diagnosis)  Comment:   Plan: buPROPion (WELLBUTRIN) 75 MG tablet            Is consider stopping wellbutrin as doing well.  Advise to cut back to 75 mg BID and then try once daily in am for 2 weeks.   Call back if needing a continue dose of wellbutrin           Ramona Ann Aaseby-Aguilera, PA-C  [facility]

## 2019-01-04 NOTE — PATIENT INSTRUCTIONS
(F33.42) Major depressive disorder, recurrent episode, in full remission (H)  (primary encounter diagnosis)  Comment:   Plan: buPROPion (WELLBUTRIN) 75 MG tablet            Is consider stopping wellbutrin as doing well.  Advise to cut back to 75 mg BID and then try once daily in am for 2 weeks.   Call back if needing a continue dose of wellbutrin

## 2019-03-06 ENCOUNTER — NURSE TRIAGE (OUTPATIENT)
Dept: NURSING | Facility: CLINIC | Age: 64
End: 2019-03-06

## 2019-03-06 NOTE — TELEPHONE ENCOUNTER
Patient is requesting TSH level; says hair is falling out and nails are brittle.  FNA advised of future labs in chart that includes TSH.  FNA advised to fast for lipid test.  Caller verbalizes understanding.  Transferred call to .    Reason for Disposition    Caller requesting an appointment, triage offered and declined    Additional Information    Negative: Lab calling with strep throat test results and triager can call in prescription    Negative: Lab calling with urinalysis test results and triager can call in prescription    Negative: Medication questions    Negative: ED call to PCP    Negative: Physician call to PCP    Negative: Call about patient who is currently hospitalized    Negative: Lab or radiology calling with CRITICAL test results    Negative: [1] Prescription not at pharmacy AND [2] was prescribed today by PCP    Negative: [1] Follow-up call from patient regarding patient's clinical status AND [2] information urgent    Negative: [1] Caller requests to speak ONLY to PCP AND [2] URGENT question    Negative: [1] Caller requests to speak to PCP now AND [2] won't tell us reason for call  (Exception: if 10 pm to 6 am, caller must first discuss reason for the call)    Negative: Notification of hospital admission    Negative: Notification of death    Negative: Caller requesting lab results    Negative: Lab or radiology calling with test results    Negative: [1] Follow-up call from patient regarding patient's clinical status AND [2] information NON-URGENT    Negative: [1] Caller requests to speak ONLY to PCP AND [2] NON-URGENT question    Protocols used: PCP CALL - NO TRIAGE-[facility] [place]

## 2019-03-12 DIAGNOSIS — Z00.00 ENCOUNTER FOR ROUTINE ADULT HEALTH EXAMINATION WITHOUT ABNORMAL FINDINGS: ICD-10-CM

## 2019-03-12 LAB
ALBUMIN SERPL-MCNC: 3.6 G/DL (ref 3.4–5)
ALP SERPL-CCNC: 59 U/L (ref 40–150)
ALT SERPL W P-5'-P-CCNC: 18 U/L (ref 0–50)
ANION GAP SERPL CALCULATED.3IONS-SCNC: 7 MMOL/L (ref 3–14)
AST SERPL W P-5'-P-CCNC: 14 U/L (ref 0–45)
BILIRUB SERPL-MCNC: 0.2 MG/DL (ref 0.2–1.3)
BUN SERPL-MCNC: 17 MG/DL (ref 7–30)
CALCIUM SERPL-MCNC: 9.4 MG/DL (ref 8.5–10.1)
CHLORIDE SERPL-SCNC: 107 MMOL/L (ref 94–109)
CHOLEST SERPL-MCNC: 233 MG/DL
CO2 SERPL-SCNC: 27 MMOL/L (ref 20–32)
CREAT SERPL-MCNC: 0.7 MG/DL (ref 0.52–1.04)
ERYTHROCYTE [DISTWIDTH] IN BLOOD BY AUTOMATED COUNT: 13.7 % (ref 10–15)
GFR SERPL CREATININE-BSD FRML MDRD: >90 ML/MIN/{1.73_M2}
GLUCOSE SERPL-MCNC: 101 MG/DL (ref 70–99)
HCT VFR BLD AUTO: 42.5 % (ref 35–47)
HDLC SERPL-MCNC: 103 MG/DL
HGB BLD-MCNC: 14.2 G/DL (ref 11.7–15.7)
LDLC SERPL CALC-MCNC: 113 MG/DL
MCH RBC QN AUTO: 31.3 PG (ref 26.5–33)
MCHC RBC AUTO-ENTMCNC: 33.4 G/DL (ref 31.5–36.5)
MCV RBC AUTO: 94 FL (ref 78–100)
NONHDLC SERPL-MCNC: 130 MG/DL
PLATELET # BLD AUTO: 236 10E9/L (ref 150–450)
POTASSIUM SERPL-SCNC: 4.4 MMOL/L (ref 3.4–5.3)
PROT SERPL-MCNC: 7.3 G/DL (ref 6.8–8.8)
RBC # BLD AUTO: 4.53 10E12/L (ref 3.8–5.2)
SODIUM SERPL-SCNC: 141 MMOL/L (ref 133–144)
TRIGL SERPL-MCNC: 84 MG/DL
TSH SERPL DL<=0.005 MIU/L-ACNC: 1.57 MU/L (ref 0.4–4)
WBC # BLD AUTO: 4.8 10E9/L (ref 4–11)

## 2019-03-12 PROCEDURE — 36415 COLL VENOUS BLD VENIPUNCTURE: CPT | Performed by: FAMILY MEDICINE

## 2019-03-12 PROCEDURE — 84443 ASSAY THYROID STIM HORMONE: CPT | Performed by: FAMILY MEDICINE

## 2019-03-12 PROCEDURE — 85027 COMPLETE CBC AUTOMATED: CPT | Performed by: FAMILY MEDICINE

## 2019-03-12 PROCEDURE — 80061 LIPID PANEL: CPT | Performed by: FAMILY MEDICINE

## 2019-03-12 PROCEDURE — 80053 COMPREHEN METABOLIC PANEL: CPT | Performed by: FAMILY MEDICINE

## 2019-04-18 ENCOUNTER — OFFICE VISIT (OUTPATIENT)
Dept: ENDOCRINOLOGY | Facility: CLINIC | Age: 64
End: 2019-04-18
Payer: COMMERCIAL

## 2019-04-18 VITALS
DIASTOLIC BLOOD PRESSURE: 84 MMHG | TEMPERATURE: 97.5 F | SYSTOLIC BLOOD PRESSURE: 130 MMHG | BODY MASS INDEX: 30.53 KG/M2 | RESPIRATION RATE: 12 BRPM | WEIGHT: 171 LBS | HEART RATE: 57 BPM

## 2019-04-18 DIAGNOSIS — E03.9 ACQUIRED HYPOTHYROIDISM: Primary | ICD-10-CM

## 2019-04-18 PROCEDURE — 99214 OFFICE O/P EST MOD 30 MIN: CPT | Performed by: CLINICAL NURSE SPECIALIST

## 2019-04-18 RX ORDER — LEVOTHYROXINE SODIUM 125 MCG
125 TABLET ORAL DAILY
Qty: 90 TABLET | Refills: 3 | Status: SHIPPED | OUTPATIENT
Start: 2019-04-18 | End: 2019-06-19

## 2019-04-18 RX ORDER — LIOTHYRONINE SODIUM 5 UG/1
5 TABLET ORAL DAILY
Qty: 90 TABLET | Refills: 3 | Status: SHIPPED | OUTPATIENT
Start: 2019-04-18 | End: 2019-06-19

## 2019-04-18 NOTE — PATIENT INSTRUCTIONS
Try taking Cytomel twice daily again - 1/2 tab first thing in the morning with the Sythroid and the second 1/2 tab either 30 minutes before lunch OR 2-3 hours after lunch.    Labs in 8-10 weeks.  I'll let you know results when available and if the dose needs to change.      Let me know if you continue to have symptoms.    Otherwise follow up in one year    Yasmine Rodriguez NP  Endocrinology

## 2019-04-18 NOTE — PROGRESS NOTES
Name: Yasmine Sutton (Last seen 8/16/2018).  F/u for   Chief Complaint   Patient presents with     Thyroid Problem     HPI:  Yasmine Sutton is a 63 year old female who presents for the management of hypothyroidism.      Initially diagnosed with hyperthyroidism/Graves' disease in 2000 or 2001.  Was treated with oral medication with remission of the hyperthyroidism.  She subsequently became hypothyroid and was started on Synthroid.  Continued to be treated with Synthroid only until about 2 years ago when Cytomel 5 mcg daily was added due to continued symptoms of hypothyroidism.  Her insurance has changed and she is no longer covered at the clinic where she was previously seen and treated (was also taking bioidentical hormone replacement).  She had been off Cytomel and was not feeling well on Levothyroxine alone; she was c/o  +Dry skin, + weight gain-10 pounds, fatigue, brittle nails, increased hair loss, and constipation.  Cytomel was resumed and levothyroxine was changed to name brand Synthroid.    Was previously taking Synthroid (GIORGI) 125 mcg per day + cytomel 5 mcg/day.  Didn't feel it was effective so she discontinued the cytomel and increased synthroid to 137 mcg/day.   Now thinks she was feeling better on combination of cytomel and synthroid.  C/o dry skin - heels are cracking, + fatigue and constipation.    Partial hysterectomy 12/2014.     +hot flashes and night sweats - history of partial hysterectomy.  FSH elevated consistent with menopause. Took compounded HRT x 3 years, off x2 years.  Hot flashes and night sweats previously resolved but recently noting return of hot flashes/facial flushing during the day and heat intolerance at night.  2.  Goiter.  Thyroid US showed stable goiter, no nodules.    3.  Osteopenia.  Takes vitamin D 5000 IU four days per week.  This dose was decreased from 5000 units everyday one year ago due to high-normal vitamin D level.  Eats 3-4 servings dairy per day - no additional  calcium supplements.  Active, exercises regularly.   PMH/PSH:  Past Medical History:   Diagnosis Date     Abnormal Pap smear, can't excl hi gd sq intraepithelial lesion (ASC-H) 9/2014    + HPV 31     Cervical high risk HPV (human papillomavirus) test positive 11/2011, 11/2012    + HPV 31, NIL pap, '13colp:JORDIN I & II & III     Depression, major      Major depressive disorder, recurrent episode, in full remission (H) 10/20/2017     PONV (postoperative nausea and vomiting)      Toxic diffuse goiter without mention of thyrotoxic crisis or storm 1991    resolved     Past Surgical History:   Procedure Laterality Date     ABDOMEN SURGERY      Abdominoplasty     APPENDECTOMY       CYSTOSCOPY N/A 12/9/2014    Procedure: CYSTOSCOPY;  Surgeon: Joy Monterroso DO;  Location: [facility] OR     DAVINCI HYSTERECTOMY TOTAL, BILATERAL SALPINGO-OOPHORECTOMY, COMBINED Bilateral 12/9/2014    Procedure: COMBINED DAVINCI HYSTERECTOMY TOTAL, SALPINGO-OOPHORECTOMY;  Surgeon: Joy Monterroso DO;  Location: [facility] OR     HC EMBOLIZATION UTERINE FIBROID  2007     HC REMOVAL GALLBLADDER      Cholecystectomy     LEEP TX, CERVICAL  9/2013    JORDIN III     Family Hx:  Family History   Problem Relation Age of Onset     Heart Disease Mother         MI     Respiratory Mother         copd     Thyroid Disease Mother         hypothyroid     Gastrointestinal Disease Mother         ulcer disease     Depression Mother      Gastrointestinal Disease Father         ulcer disease     C.A.D. Father         MI @ 69 (smoker, EtOH)     Depression Father      Diabetes Brother      Thyroid Disease Brother         hypothyroid     Thyroid Disease Paternal Aunt         hypothyroid     Thyroid Disease Sister      Thyroid Disease Sister      Thyroid disease: Yes - mother, two sisters, one brother         DM2: No         Autoimmune: DM1, SLE, RA, Vitiligo: Yes - brother with type 1 diabetes    Social Hx:  Social History     Socioeconomic History     Marital status:       Spouse name: Not on file     Number of children: 4     Years of education: Not on file     Highest education level: Not on file   Occupational History     Comment: retired   Social Needs     Financial resource strain: Not on file     Food insecurity:     Worry: Not on file     Inability: Not on file     Transportation needs:     Medical: Not on file     Non-medical: Not on file   Tobacco Use     Smoking status: Former Smoker     Packs/day: 0.50     Years: 20.00     Pack years: 10.00     Last attempt to quit: 2013     Years since quittin.3     Smokeless tobacco: Former User     Tobacco comment: e cig for a short time   Substance and Sexual Activity     Alcohol use: Yes     Alcohol/week: 3.0 oz     Types: 5 Standard drinks or equivalent per week     Comment: 10 drinks on the weekends     Drug use: No     Sexual activity: Yes     Birth control/protection: Post-menopausal   Lifestyle     Physical activity:     Days per week: Not on file     Minutes per session: Not on file     Stress: Not on file   Relationships     Social connections:     Talks on phone: Not on file     Gets together: Not on file     Attends Alevism service: Not on file     Active member of club or organization: Not on file     Attends meetings of clubs or organizations: Not on file     Relationship status: Not on file     Intimate partner violence:     Fear of current or ex partner: Not on file     Emotionally abused: Not on file     Physically abused: Not on file     Forced sexual activity: Not on file   Other Topics Concern      Service No     Blood Transfusions No     Caffeine Concern No     Occupational Exposure No     Hobby Hazards No     Sleep Concern No     Stress Concern No     Weight Concern No     Special Diet No     Back Care No     Exercise No     Bike Helmet No     Seat Belt Yes     Self-Exams Not Asked     Parent/sibling w/ CABG, MI or angioplasty before 65F 55M? No   Social History Narrative    , NO  CHILDREN.  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[employer]          MEDICATIONS:  has a current medication list which includes the following prescription(s): liothyronine, magnesium oxide, and synthroid.    ROS   ROS: 10 point ROS neg other than the symptoms noted above in the HPI.    Physical Exam   VS: /84 (BP Location: Right arm, Patient Position: Chair, Cuff Size: Adult Regular)   Pulse 57   Temp 97.5  F (36.4  C) (Oral)   Resp 12   Wt 77.6 kg (171 lb)   LMP 05/08/2008   Breastfeeding? No   BMI 30.53 kg/m    GENERAL: NAD, well dressed, answering questions appropriately, appears stated age.  HEENT: no proptosis, no lig lag, no retraction, no scleral icterus  NECK:  Supple, thyroid gland slightly enlarged, left > right, no distinct nodules, no adenopathy.  RESPIRATORY: Clear.  Normal respiratory effort.  CARDIOVASCULAR: RRR.  No peripheral edema.  NEUROLOGY: CN grossly intact, no tremors  MSK: grossly intact, No digital cyanosis. Normal gait and station.  PSYCH: Intact judgment and insight. A&OX3 with a cordial affect.    LABS:  TFTs:  ENDO THYROID LABS-[facility] Latest Ref Rng & Units 3/12/2019 8/16/2018   TSH 0.40 - 4.00 mU/L 1.57 4.28 (H)   T4 TOTAL 4.5 - 12.5 MCG/DL     T4 FREE 0.76 - 1.46 ng/dL  1.18     [facility]2017 6/13/2016   TSH 0.40 - 4.00 mU/L 1.23 1.80   T4 TOTAL 4.5 - 12.5 MCG/DL     T4 FREE 0.76 - 1.46 ng/dL 1.14 1.22     ENDO THYROID LABS-[facility] Latest Ref Rng & Units 3/11/2016 8/31/2015   TSH 0.40 - 4.00 mU/L 0.23 (L) 4.69 (H)   T4 TOTAL 4.5 - 12.5 MCG/DL     T4 FREE 0.76 - 1.46 ng/dL 1.38 1.15       TG/TPO:  Component    Latest Ref Rng 4/5/2001 1/6/2009   Thyroid Peroxidase Antibody    <35 IU/mL  64079 (H)   Thyroid Peroxidase Antibody    <2 UNITS/ML >70 H    Thyroglobulin Antibody    <2 UNITS/ML 14 (H)      Component    Latest Ref Rng 8/31/2015   FSH     88.8     Component    Latest Ref Rng 8/31/2015   Vitamin D Deficiency screening    20 - 75 ug/L 32      ULTRASOUND THYROID 9/9/2015    HISTORY: Unspecified hypothyroidism.  FINDINGS: Thyroid ultrasound demonstrates an enlarged thyroid gland.  The right lobe measures 5.1 x 3.0 x 2.5 cm. The left lobe measures 6.3  x 2.2 x 2.8 cm. The isthmus is normal in thickness. Thyroid parenchyma  is heterogeneous in echotexture. Increased color Doppler flow is  noted within the gland.  Thyroid nodules as follows:   Right Lobe: None.  Isthmus: None.  Left Lobe: None.  IMPRESSION  IMPRESSION: Enlarged thyroid gland with heterogeneous echotexture. No  discrete thyroid nodules are appreciated. Increased color Doppler  flow is noted throughout the gland, likely indicating underlying  Thyroiditis.    DEXA 9/8/2015  FINDINGS:  Lumbar Spine (L1-L4) T-score: 1.4  Left Femoral Neck T-score: -0.4  Right Femoral Neck T-score: -1.6    Lumbar (L1-L4) BMD: 1.373 Previous: 1.530   Total Hip Mean BMD: 0.961 Previous: 1.075    Comparison is made to another DXA performed on a different MD-IT machine on 12/27/2006.       IMPRESSION  Osteopenia (low bone mass)  Degenerative changes of the spine  Recommendations include ensuring adequate daily Calcium and Vitamin D intake  Follow up scan can be considered in three to five years.    Comparisons from different scanners that have not been cross calibrated, are not necessarily valid. Such a comparison has been performed here; one should interpret with caution.  Compared to previous bone densitometry performed on this patient, there is the suggestion of a possible trend towards worsening of the lumbar spine, and a possible trend towards worsening of the total hip.    All pertinent notes, labs, and images personally reviewed by me.     A/P  Ms.Linda PANCHO Sutton is a 63 year old here for the evaluation of hypothyroidism:    1. Hypothyroidism.  Currently treated with Synthroid (GIORGI) 137 mcg daily .    Past history of Graves' disease, + significantly elevated TPO antibodies consistent with Hashimoto's.    She  would like to restart cytomel 5 mcg/day and Synthroid 125 mcg/day - believes she did feel better on both.  Try taking cytomel 2.5 mcg bid.  Repeat TFT's in 6-8 weeks.    2.  Osteopenia.    Recommend daily calcium intake 1200 mg/day.    Currently eats 3-4 servings of dairy per day.  Takes no additional calcium supplements.  Currently takes vitamin D 5000 IU four days per week = average daily dose of 2850 units/day.  Continue current D dose.    Repeat DEXA scan [id number].    Labs ordered today:   Orders Placed This Encounter   Procedures     TSH     T4 FREE     T3 Free       More than 50% of the time spent with Ms. Sutton on counseling / coordinating her care.  Total face to face time was greater than or equal to 25 minutes.      Follow-up:  One year, prn sooner if needed.    Yasmine Rodriguez NP  Endocrinology  [facility]  CC: Leland Tirado

## 2019-04-18 NOTE — LETTER
4/18/2019         RE: Yasmine Sutton  [address] [facility] [address]        Dear Colleague,    Thank you for referring your patient, Yasmine Sutton, to the [facility]. Please see a copy of my visit note below.    Name: Yasmine Sutton (Last seen 8/16/2018).  F/u for   Chief Complaint   Patient presents with     Thyroid Problem     HPI:  Yasmine Sutton is a 63 year old female who presents for the management of hypothyroidism.      Initially diagnosed with hyperthyroidism/Graves' disease in 2000 or 2001.  Was treated with oral medication with remission of the hyperthyroidism.  She subsequently became hypothyroid and was started on Synthroid.  Continued to be treated with Synthroid only until about 2 years ago when Cytomel 5 mcg daily was added due to continued symptoms of hypothyroidism.  Her insurance has changed and she is no longer covered at the clinic where she was previously seen and treated (was also taking bioidentical hormone replacement).  She had been off Cytomel and was not feeling well on Levothyroxine alone; she was c/o  +Dry skin, + weight gain-10 pounds, fatigue, brittle nails, increased hair loss, and constipation.  Cytomel was resumed and levothyroxine was changed to name brand Synthroid.    Was previously taking Synthroid (GIORGI) 125 mcg per day + cytomel 5 mcg/day.  Didn't feel it was effective so she discontinued the cytomel and increased synthroid to 137 mcg/day.   Now thinks she was feeling better on combination of cytomel and synthroid.  C/o dry skin - heels are cracking, + fatigue and constipation.    Partial hysterectomy 12/2014.     +hot flashes and night sweats - history of partial hysterectomy.  FSH elevated consistent with menopause. Took compounded HRT x 3 years, off x2 years.  Hot flashes and night sweats previously resolved but recently noting return of hot flashes/facial flushing during the day and heat intolerance at night.  2.  Goiter.  Thyroid US  showed stable goiter, no nodules.    3.  Osteopenia.  Takes vitamin D 5000 IU four days per week.  This dose was decreased from 5000 units everyday one year ago due to high-normal vitamin D level.  Eats 3-4 servings dairy per day - no additional calcium supplements.  Active, exercises regularly.   PMH/PSH:  Past Medical History:   Diagnosis Date     Abnormal Pap smear, can't excl hi gd sq intraepithelial lesion (ASC-H) 9/2014    + HPV 31     Cervical high risk HPV (human papillomavirus) test positive 11/2011, 11/2012    + HPV 31, NIL pap, '13colp:JORDIN I & II & III     Depression, major      Major depressive disorder, recurrent episode, in full remission (H) 10/20/2017     PONV (postoperative nausea and vomiting)      Toxic diffuse goiter without mention of thyrotoxic crisis or storm 1991    resolved     Past Surgical History:   Procedure Laterality Date     ABDOMEN SURGERY      Abdominoplasty     APPENDECTOMY       CYSTOSCOPY N/A 12/9/2014    Procedure: CYSTOSCOPY;  Surgeon: Joy Monterroso DO;  Location: [facility] OR     DAVINCI HYSTERECTOMY TOTAL, BILATERAL SALPINGO-OOPHORECTOMY, COMBINED Bilateral 12/9/2014    Procedure: COMBINED DAVINCI HYSTERECTOMY TOTAL, SALPINGO-OOPHORECTOMY;  Surgeon: Joy Monterroso DO;  Location: [facility] OR     HC EMBOLIZATION UTERINE FIBROID  2007     HC REMOVAL GALLBLADDER      Cholecystectomy     LEEP TX, CERVICAL  9/2013    JORDIN III     Family Hx:  Family History   Problem Relation Age of Onset     Heart Disease Mother         MI     Respiratory Mother         copd     Thyroid Disease Mother         hypothyroid     Gastrointestinal Disease Mother         ulcer disease     Depression Mother      Gastrointestinal Disease Father         ulcer disease     C.A.D. Father         MI @ 69 (smoker, EtOH)     Depression Father      Diabetes Brother      Thyroid Disease Brother         hypothyroid     Thyroid Disease Paternal Aunt         hypothyroid     Thyroid Disease Sister      Thyroid Disease  Sister      Thyroid disease: Yes - mother, two sisters, one brother         DM2: No         Autoimmune: DM1, SLE, RA, Vitiligo: Yes - brother with type 1 diabetes    Social Hx:  Social History     Socioeconomic History     Marital status:      Spouse name: Not on file     Number of children: 4     Years of education: Not on file     Highest education level: Not on file   Occupational History     Comment: retired   Social Needs     Financial resource strain: Not on file     Food insecurity:     Worry: Not on file     Inability: Not on file     Transportation needs:     Medical: Not on file     Non-medical: Not on file   Tobacco Use     Smoking status: Former Smoker     Packs/day: 0.50     Years: 20.00     Pack years: 10.00     Last attempt to quit: 2013     Years since quittin.3     Smokeless tobacco: Former User     Tobacco comment: e cig for a short time   Substance and Sexual Activity     Alcohol use: Yes     Alcohol/week: 3.0 oz     Types: 5 Standard drinks or equivalent per week     Comment: 10 drinks on the weekends     Drug use: No     Sexual activity: Yes     Birth control/protection: Post-menopausal   Lifestyle     Physical activity:     Days per week: Not on file     Minutes per session: Not on file     Stress: Not on file   Relationships     Social connections:     Talks on phone: Not on file     Gets together: Not on file     Attends Buddhism service: Not on file     Active member of club or organization: Not on file     Attends meetings of clubs or organizations: Not on file     Relationship status: Not on file     Intimate partner violence:     Fear of current or ex partner: Not on file     Emotionally abused: Not on file     Physically abused: Not on file     Forced sexual activity: Not on file   Other Topics Concern      Service No     Blood Transfusions No     Caffeine Concern No     Occupational Exposure No     Hobby Hazards No     Sleep Concern No     Stress Concern No      Weight Concern No     Special Diet No     Back Care No     Exercise No     Bike Helmet No     Seat Belt Yes     Self-Exams Not Asked     Parent/sibling w/ CABG, MI or angioplasty before 65F 55M? No   Social History Narrative    , NO CHILDREN.  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[employer]          MEDICATIONS:  has a current medication list which includes the following prescription(s): liothyronine, magnesium oxide, and synthroid.    ROS   ROS: 10 point ROS neg other than the symptoms noted above in the HPI.    Physical Exam   VS: /84 (BP Location: Right arm, Patient Position: Chair, Cuff Size: Adult Regular)   Pulse 57   Temp 97.5  F (36.4  C) (Oral)   Resp 12   Wt 77.6 kg (171 lb)   LMP 05/08/2008   Breastfeeding? No   BMI 30.53 kg/m     GENERAL: NAD, well dressed, answering questions appropriately, appears stated age.  HEENT: no proptosis, no lig lag, no retraction, no scleral icterus  NECK:  Supple, thyroid gland slightly enlarged, left > right, no distinct nodules, no adenopathy.  RESPIRATORY: Clear.  Normal respiratory effort.  CARDIOVASCULAR: RRR.  No peripheral edema.  NEUROLOGY: CN grossly intact, no tremors  MSK: grossly intact, No digital cyanosis. Normal gait and station.  PSYCH: Intact judgment and insight. A&OX3 with a cordial affect.    LABS:  TFTs:  ENDO THYROID LABS-[facility] Latest Ref Rng & Units 3/12/2019 8/16/2018   TSH 0.40 - 4.00 mU/L 1.57 4.28 (H)   T4 TOTAL 4.5 - 12.5 MCG/DL     T4 FREE 0.76 - 1.46 ng/dL  1.18     ENDO THYROID LABS-[facility] Latest Ref Rng & Units 5/9/2017 6/13/2016   TSH 0.40 - 4.00 mU/L 1.23 1.80   T4 TOTAL 4.5 - 12.5 MCG/DL     T4 FREE 0.76 - 1.46 ng/dL 1.14 1.22     ENDO THYROID LABS-[facility] Latest Ref Rng & Units 3/11/2016 8/31/2015   TSH 0.40 - 4.00 mU/L 0.23 (L) 4.69 (H)   T4 TOTAL 4.5 - 12.5 MCG/DL     T4 FREE 0.76 - 1.46 ng/dL 1.38 1.15       TG/TPO:  Component    Latest Ref Rng 4/5/2001 1/6/2009   Thyroid Peroxidase Antibody    <35 IU/mL  46530 (H)   Thyroid  Peroxidase Antibody    <2 UNITS/ML >70 H    Thyroglobulin Antibody    <2 UNITS/ML 14 (H)      Component    Latest Ref Rng 8/31/2015   FSH     88.8     Component    Latest Ref Rng 8/31/2015   Vitamin D Deficiency screening    20 - 75 ug/L 32     ULTRASOUND THYROID 9/9/2015    HISTORY: Unspecified hypothyroidism.  FINDINGS: Thyroid ultrasound demonstrates an enlarged thyroid gland.  The right lobe measures 5.1 x 3.0 x 2.5 cm. The left lobe measures 6.3  x 2.2 x 2.8 cm. The isthmus is normal in thickness. Thyroid parenchyma  is heterogeneous in echotexture. Increased color Doppler flow is  noted within the gland.  Thyroid nodules as follows:   Right Lobe: None.  Isthmus: None.  Left Lobe: None.  IMPRESSION  IMPRESSION: Enlarged thyroid gland with heterogeneous echotexture. No  discrete thyroid nodules are appreciated. Increased color Doppler  flow is noted throughout the gland, likely indicating underlying  Thyroiditis.    DEXA 9/8/2015  FINDINGS:  Lumbar Spine (L1-L4) T-score: 1.4  Left Femoral Neck T-score: -0.4  Right Femoral Neck T-score: -1.6    Lumbar (L1-L4) BMD: 1.373 Previous: 1.530   Total Hip Mean BMD: 0.961 Previous: 1.075    Comparison is made to another DXA performed on a different MODLOFT machine on 12/27/2006.       IMPRESSION  Osteopenia (low bone mass)  Degenerative changes of the spine  Recommendations include ensuring adequate daily Calcium and Vitamin D intake  Follow up scan can be considered in three to five years.    Comparisons from different scanners that have not been cross calibrated, are not necessarily valid. Such a comparison has been performed here; one should interpret with caution.  Compared to previous bone densitometry performed on this patient, there is the suggestion of a possible trend towards worsening of the lumbar spine, and a possible trend towards worsening of the total hip.    All pertinent notes, labs, and images personally reviewed by me.     A/P  Ms.Linda PANCHO Sutton is a 63  year old here for the evaluation of hypothyroidism:    1. Hypothyroidism.  Currently treated with Synthroid (GIORGI) 137 mcg daily .    Past history of Graves' disease, + significantly elevated TPO antibodies consistent with Hashimoto's.    She would like to restart cytomel 5 mcg/day and Synthroid 125 mcg/day - believes she did feel better on both.  Try taking cytomel 2.5 mcg bid.  Repeat TFT's in 6-8 weeks.    2.  Osteopenia.    Recommend daily calcium intake 1200 mg/day.    Currently eats 3-4 servings of dairy per day.  Takes no additional calcium supplements.  Currently takes vitamin D 5000 IU four days per week = average daily dose of 2850 units/day.  Continue current D dose.    Repeat DEXA scan [id number].    Labs ordered today:   Orders Placed This Encounter   Procedures     TSH     T4 FREE     T3 Free       More than 50% of the time spent with Ms. Sutton on counseling / coordinating her care.  Total face to face time was greater than or equal to 25 minutes.      Follow-up:  One year, prn sooner if needed.    Yasmine Rodriguez NP  Endocrinology  [facility]  CC: Leland Tirado      Again, thank you for allowing me to participate in the care of your patient.        Sincerely,        WYATT Molina CNP

## 2019-06-17 DIAGNOSIS — E03.9 ACQUIRED HYPOTHYROIDISM: ICD-10-CM

## 2019-06-17 LAB — T3FREE SERPL-MCNC: 2.8 PG/ML (ref 2.3–4.2)

## 2019-06-17 PROCEDURE — 84439 ASSAY OF FREE THYROXINE: CPT | Performed by: CLINICAL NURSE SPECIALIST

## 2019-06-17 PROCEDURE — 84481 FREE ASSAY (FT-3): CPT | Performed by: CLINICAL NURSE SPECIALIST

## 2019-06-17 PROCEDURE — 36415 COLL VENOUS BLD VENIPUNCTURE: CPT | Performed by: CLINICAL NURSE SPECIALIST

## 2019-06-17 PROCEDURE — 84443 ASSAY THYROID STIM HORMONE: CPT | Performed by: CLINICAL NURSE SPECIALIST

## 2019-06-17 ASSESSMENT — PATIENT HEALTH QUESTIONNAIRE - PHQ9: SUM OF ALL RESPONSES TO PHQ QUESTIONS 1-9: 18

## 2019-06-18 LAB
T4 FREE SERPL-MCNC: 1.23 NG/DL (ref 0.76–1.46)
TSH SERPL DL<=0.005 MIU/L-ACNC: 1.28 MU/L (ref 0.4–4)

## 2019-06-19 RX ORDER — LIOTHYRONINE SODIUM 5 UG/1
5 TABLET ORAL DAILY
Qty: 90 TABLET | Refills: 3 | Status: SHIPPED | OUTPATIENT
Start: 2019-06-19 | End: 2021-05-20

## 2019-06-19 RX ORDER — LEVOTHYROXINE SODIUM 125 MCG
125 TABLET ORAL DAILY
Qty: 90 TABLET | Refills: 3 | Status: SHIPPED | OUTPATIENT
Start: 2019-06-19 | End: 2019-08-23

## 2019-06-19 NOTE — RESULT ENCOUNTER NOTE
Yasmine,  Your thyroid levels look good.  Hopefully you are feeling better.  Please continue synthroid 125 mcg/day and cytomel 5 mcg/day.  Yasmine Rodriguez NP  Endocrinology

## 2019-08-21 ENCOUNTER — TELEPHONE (OUTPATIENT)
Dept: ENDOCRINOLOGY | Facility: CLINIC | Age: 64
End: 2019-08-21

## 2019-08-21 DIAGNOSIS — E03.9 ACQUIRED HYPOTHYROIDISM: ICD-10-CM

## 2019-08-21 NOTE — TELEPHONE ENCOUNTER
Pt called, is currently on Synthroid 125mcg but would like to go back to 137mcg. Pt stated she would like to  paper copy of prescription at [facility] tomorrow 08/22/19. Please call when ready to .

## 2019-08-23 RX ORDER — LEVOTHYROXINE SODIUM 137 UG/1
137 TABLET ORAL DAILY
Qty: 90 TABLET | Refills: 3 | Status: SHIPPED | OUTPATIENT
Start: 2019-08-23 | End: 2020-10-14

## 2019-08-23 RX ORDER — LEVOTHYROXINE SODIUM 137 MCG
137 TABLET ORAL DAILY
Qty: 90 TABLET | Refills: 3 | Status: CANCELLED | OUTPATIENT
Start: 2019-08-23

## 2019-08-23 NOTE — TELEPHONE ENCOUNTER
Message left for patient to return call to clinic and ask to speak to available triage nurse     Kate Carlton, Registered Nurse   [facility]

## 2019-08-23 NOTE — TELEPHONE ENCOUNTER
Please call - I did not review her message until now, 8/22 at 7 pm.  Find out if she plans to stop the cytomel and this is why she wants to increase back to 137 mcg.  Let me know.  I will check messages tomorrow and provide printed Rx by tomorrow afternoon if approved.  Thanks.  Yasmine Rodriguez NP  Endocrinology

## 2019-08-23 NOTE — TELEPHONE ENCOUNTER
Pt reports stopped Cytomel due to symptoms of low thyroid: heels cracking, nails splitting, leg cramps and hair falling out.  She was using up Synthroid 125 but will discard remaining pills. Requests e-Rx [facility] Synthroid 137 mcg t'd up.   Lele Mark, RN

## 2019-08-23 NOTE — TELEPHONE ENCOUNTER
"Faxed Rxn to [facility].  Left voicemail for patient that \"requested Rxn was faxed to [facility].  Advised to call back with any questions.  Theodora Kern CMA on 8/23/2019 at 3:31 PM  "

## 2019-10-03 ENCOUNTER — OFFICE VISIT (OUTPATIENT)
Dept: FAMILY MEDICINE | Facility: CLINIC | Age: 64
End: 2019-10-03
Payer: COMMERCIAL

## 2019-10-03 VITALS
OXYGEN SATURATION: 94 % | WEIGHT: 171.8 LBS | HEART RATE: 78 BPM | SYSTOLIC BLOOD PRESSURE: 123 MMHG | TEMPERATURE: 97.6 F | RESPIRATION RATE: 17 BRPM | DIASTOLIC BLOOD PRESSURE: 70 MMHG | HEIGHT: 63 IN | BODY MASS INDEX: 30.44 KG/M2

## 2019-10-03 DIAGNOSIS — Z00.00 ROUTINE GENERAL MEDICAL EXAMINATION AT A HEALTH CARE FACILITY: Primary | ICD-10-CM

## 2019-10-03 DIAGNOSIS — R06.02 SOB (SHORTNESS OF BREATH): ICD-10-CM

## 2019-10-03 PROCEDURE — 99396 PREV VISIT EST AGE 40-64: CPT | Performed by: PHYSICIAN ASSISTANT

## 2019-10-03 RX ORDER — ALBUTEROL SULFATE 90 UG/1
2 AEROSOL, METERED RESPIRATORY (INHALATION) EVERY 4 HOURS PRN
Qty: 1 INHALER | Refills: 0 | Status: SHIPPED | OUTPATIENT
Start: 2019-10-03 | End: 2022-05-03

## 2019-10-03 ASSESSMENT — ENCOUNTER SYMPTOMS
HEMATURIA: 0
CHILLS: 0
ABDOMINAL PAIN: 0
HEMATOCHEZIA: 0

## 2019-10-03 ASSESSMENT — MIFFLIN-ST. JEOR: SCORE: 1308.41

## 2019-10-03 NOTE — PROGRESS NOTES
SUBJECTIVE:   CC: Yasmine Sutton is an 63 year old woman who presents for preventive health visit.     Healthy Habits:     Getting at least 3 servings of Calcium per day:  Yes    Bi-annual eye exam:  Yes    Dental care twice a year:  Yes    Sleep apnea or symptoms of sleep apnea:  None    Diet:  Regular (no restrictions)    Frequency of exercise:  None    Taking medications regularly:  Yes    Medication side effects:  Not applicable    PHQ-2 Total Score: 2    Additional concerns today:  Yes              Today's PHQ-2 Score:   PHQ-2 (  [employer]) 2019   Q1: Little interest or pleasure in doing things 0   Q2: Feeling down, depressed or hopeless 1   PHQ-2 Score 1   Q1: Little interest or pleasure in doing things Not at all   Q2: Feeling down, depressed or hopeless Several days   PHQ-2 Score 1       Abuse: Current or Past(Physical, Sexual or Emotional)- No  Do you feel safe in your environment? Yes    Social History     Tobacco Use     Smoking status: Former Smoker     Packs/day: 0.50     Years: 20.00     Pack years: 10.00     Last attempt to quit: 2013     Years since quittin.7     Smokeless tobacco: Former User     Tobacco comment: e cig for a short time   Substance Use Topics     Alcohol use: Yes     Alcohol/week: 5.0 standard drinks     Types: 5 Standard drinks or equivalent per week     Comment: 10 drinks on the weekends     If you drink alcohol do you typically have >3 drinks per day or >7 drinks per week? No    Alcohol Use 2019   Prescreen: >3 drinks/day or >7 drinks/week? Yes   Prescreen: >3 drinks/day or >7 drinks/week? -   AUDIT SCORE  5     AUDIT - Alcohol Use Disorders Identification Test - Reproduced from the World Health Organization Audit 2001 (Second Edition) 10/3/2019   1.  How often do you have a drink containing alcohol? 2 to 3 times a week   2.  How many drinks containing alcohol do you have on a typical day when you are drinking? 3 or 4   3.  How often do you have five or more  drinks on one occasion? Less than monthly   4.  How often during the last year have you found that you were not able to stop drinking once you had started? Never   5.  How often during the last year have you failed to do what was normally expected of you because of drinking? Never   6.  How often during the last year have you needed a first drink in the morning to get yourself going after a heavy drinking session? Never   7.  How often during the last year have you had a feeling of guilt or remorse after drinking? Never   8.  How often during the last year have you been unable to remember what happened the night before because of your drinking? Never   9.  Have you or someone else been injured because of your drinking? No   10. Has a relative, friend, doctor or other health care worker been concerned about your drinking or suggested you cut down? No   TOTAL SCORE 5       Reviewed orders with patient.  Reviewed health maintenance and updated orders accordingly - Yes      Mammogram Screening: Patient over age 50, mutual decision to screen reflected in health maintenance.    Pertinent mammograms are reviewed under the imaging tab.  History of abnormal Pap smear: Status post benign hysterectomy. Health Maintenance and Surgical History updated.  PAP / HPV 9/22/2014 3/10/2014 11/27/2012   PAP ASC-H(A) NIL NIL   PAP DATE - [facility] - - -     Reviewed and updated as needed this visit by clinical staff         Reviewed and updated as needed this visit by Provider            Review of Systems  CONSTITUTIONAL: NEGATIVE for fever, chills, change in weight  INTEGUMENTARY/SKIN: NEGATIVE for worrisome rashes, moles or lesions  EYES: NEGATIVE for vision changes or irritation  ENT: NEGATIVE for ear, mouth and throat problems  RESP: NEGATIVE for significant cough or SOB  BREAST: NEGATIVE for masses, tenderness or discharge  CV: NEGATIVE for chest pain, palpitations or peripheral edema  GI: NEGATIVE for nausea, abdominal pain, heartburn,  or change in bowel habits  : NEGATIVE for unusual urinary or vaginal symptoms. No vaginal bleeding.  MUSCULOSKELETAL: NEGATIVE for significant arthralgias or myalgia  NEURO: NEGATIVE for weakness, dizziness or paresthesias  PSYCHIATRIC: NEGATIVE for changes in mood or affect      OBJECTIVE:   LMP 05/08/2008   Physical Exam  GENERAL APPEARANCE: healthy, alert and no distress  EYES: Eyes grossly normal to inspection, PERRL and conjunctivae and sclerae normal  HENT: ear canals and TM's normal, nose and mouth without ulcers or lesions, oropharynx clear and oral mucous membranes moist  NECK: no adenopathy, no asymmetry, masses, or scars and thyroid normal to palpation  RESP: lungs clear to auscultation - no rales, rhonchi or wheezes  BREAST: normal without masses, tenderness or nipple discharge and no palpable axillary masses or adenopathy  CV: regular rate and rhythm, normal S1 S2, no S3 or S4, no murmur, click or rub, no peripheral edema and peripheral pulses strong  ABDOMEN: soft, nontender, no hepatosplenomegaly, no masses and bowel sounds normal  MS: no musculoskeletal defects are noted and gait is age appropriate without ataxia  SKIN: no suspicious lesions or rashes  NEURO: Normal strength and tone, sensory exam grossly normal, mentation intact and speech normal  PSYCH: mentation appears normal and affect normal/bright    Diagnostic Test Results:  Labs reviewed in Epic    ASSESSMENT/PLAN:   1. Routine general medical examination at a health care facility      2. SOB (shortness of breath)  Has been outside cleaning up yard [place] .  Advised to use albuterol and follow-up in 3-4 weeks, sooner if symptoms worsen  - albuterol (PROAIR HFA/PROVENTIL HFA/VENTOLIN HFA) 108 (90 Base) MCG/ACT inhaler; Inhale 2 puffs into the lungs every 4 hours as needed for shortness of breath / dyspnea or wheezing  Dispense: 1 Inhaler; Refill: 0    COUNSELING:  Reviewed preventive health counseling, as reflected in  "patient instructions    Estimated body mass index is 30.53 kg/m  as calculated from the following:    Height as of 1/4/19: 1.594 m (5' 2.75\").    Weight as of 4/18/19: 77.6 kg (171 lb).         reports that she quit smoking about 6 years ago. She has a 10.00 pack-year smoking history. She has quit using smokeless tobacco.      Counseling Resources:  ATP IV Guidelines  Pooled Cohorts Equation Calculator  Breast Cancer Risk Calculator  FRAX Risk Assessment  ICSI Preventive Guidelines  Dietary Guidelines for Americans, 2010  USDA's MyPlate  ASA Prophylaxis  Lung CA Screening    Ramona Ann Aaseby-Aguilera, PA-C  [facility]  "

## 2019-12-08 ENCOUNTER — HEALTH MAINTENANCE LETTER (OUTPATIENT)
Age: 64
End: 2019-12-08

## 2020-02-04 ENCOUNTER — TELEPHONE (OUTPATIENT)
Dept: ENDOCRINOLOGY | Facility: CLINIC | Age: 65
End: 2020-02-04

## 2020-02-04 DIAGNOSIS — E03.4 HYPOTHYROIDISM DUE TO ACQUIRED ATROPHY OF THYROID: Primary | ICD-10-CM

## 2020-02-04 NOTE — TELEPHONE ENCOUNTER
Please advise on the need for lab work and we would need to send orders to [place].  Please advise, thanks.

## 2020-02-04 NOTE — TELEPHONE ENCOUNTER
Patient calling stating that she's been really tired and thinks it could possibly be her thyroid. She is requesting an order for lab work but is wintering in [place] so she would need to have it done there and then hopefully send the results to Yasmine. Please advise and call patient at [phone].    Aminata Dickens,

## 2020-02-05 NOTE — TELEPHONE ENCOUNTER
Copy of lab orders signed by Yasmine Rodriguez on my desk    LM for pt to obtain fax number of lab where she plans to have labs done    See message below

## 2020-02-05 NOTE — TELEPHONE ENCOUNTER
Lab orders provided.  Please contact patient - we will need a fax number to fax the orders to.  Please fax to the provided number.  Also, make sure she advises the lab to fax results back to my attention.  If she doesn't hear back from me by one week after she has had labs done, she should double check with the lab where she went to make sure the results were faxed and/or call our clinic back to make sure we received the results.  Many times it is difficult to get results from an outside lab in a timely manner - it's been an issue in the past so the above ensures a smoother process.  Thanks,  Yasmine Rodriguez NP  Endocrinology

## 2020-02-21 NOTE — TELEPHONE ENCOUNTER
Tried to reach patient.  Phone went to [place].  Patient has not returned earlier messages.  Left message asking patient to return our call if she is still interested in getting lab orders from Yasmine Rodriguez.  We would need outside clinic information, fax number ect.  If no longer interested, no need to call back.  Theodora Kern CMA on 2/20/2020 at 6:37 PM

## 2020-03-15 ENCOUNTER — HEALTH MAINTENANCE LETTER (OUTPATIENT)
Age: 65
End: 2020-03-15

## 2020-10-13 DIAGNOSIS — E03.9 ACQUIRED HYPOTHYROIDISM: ICD-10-CM

## 2020-10-13 NOTE — TELEPHONE ENCOUNTER
Medication Question or Refill    Who is calling: Yasmine Sutton     What medication are you calling about (include dose and sig)?: SYNTHROID 137 mg     Controlled Substance Agreement on file: No    Who prescribed the medication?: :Yasmine Rodriguez     Do you need a refill? Yes:     When did you use the medication last? Today     Patient offered an appointment? No    Do you have any questions or concerns?  No    Requested Pharmacy: [facility] in [place]    Okay to leave a detailed message?: Yes at Cell number on file:    Telephone Information:   Mobile [phone]

## 2020-10-14 RX ORDER — LEVOTHYROXINE SODIUM 137 UG/1
137 TABLET ORAL DAILY
Qty: 30 TABLET | Refills: 0 | Status: SHIPPED | OUTPATIENT
Start: 2020-10-14 | End: 2020-10-30

## 2020-10-28 ENCOUNTER — TELEPHONE (OUTPATIENT)
Dept: ENDOCRINOLOGY | Facility: CLINIC | Age: 65
End: 2020-10-28

## 2020-10-28 NOTE — TELEPHONE ENCOUNTER
The following [employer] message sent to the patient:    Anil Underwood,    I left you a voicemail but wanted to send you a [employer] message as well regarding your upcoming appointment scheduled in Endocrinology.  You are scheduled to see Yasmine Rodriguez NP, this Friday, October 30th at 2:30 p.m.  Unfortunately we are not seeing patients in clinic at this time as we are trying to minimize patient exposure and keep our patients healthy while also practicing social distancing due to COVID19.  We would be happy to do your visit by video via [employer] or Smartphone as scheduled for 10/30/2020 at 2:30 p.m.      If you log in to your [employer], you will see that [employer] video visit instructions have been sent to you.  We would be happy to do a video visit via your smartphone if you prefer and I will plan to discuss how to connect virtually when I call you to prepare for the appointment.  I will call you up to 30 minutes prior to your scheduled appointment time to confirm you are ready for the appointment and answer any questions you may have about connecting online.  If this works for you, no need to call us back.  If not, please call us to discuss alternatives for your appointment.       Thank you,  hTeodora Kern M.A.  [facility]  [phone] [employer]

## 2020-10-30 ENCOUNTER — VIRTUAL VISIT (OUTPATIENT)
Dept: ENDOCRINOLOGY | Facility: CLINIC | Age: 65
End: 2020-10-30
Payer: COMMERCIAL

## 2020-10-30 DIAGNOSIS — E03.9 ACQUIRED HYPOTHYROIDISM: ICD-10-CM

## 2020-10-30 PROCEDURE — 99213 OFFICE O/P EST LOW 20 MIN: CPT | Mod: 95 | Performed by: CLINICAL NURSE SPECIALIST

## 2020-10-30 RX ORDER — LEVOTHYROXINE SODIUM 137 UG/1
137 TABLET ORAL DAILY
Qty: 30 TABLET | Refills: 0 | Status: SHIPPED | OUTPATIENT
Start: 2020-10-30 | End: 2020-11-21

## 2020-10-30 NOTE — LETTER
"    10/30/2020         RE: Yasmine Sutton  [address] [facility] [address]        Dear Colleague,    Thank you for referring your patient, Yasmine Sutton, to the [facility]. Please see a copy of my visit note below.    Yasmine Sutton is a 65 year old female who is being evaluated via a billable telephone visit.      The patient has been notified of following:     \"This telephone visit will be conducted via a call between you and your physician/provider. We have found that certain health care needs can be provided without the need for a physical exam.  This service lets us provide the care you need with a short phone conversation.  If a prescription is necessary we can send it directly to your pharmacy.  If lab work is needed we can place an order for that and you can then stop by our lab to have the test done at a later time.    Telephone visits are billed at different rates depending on your insurance coverage. During this emergency period, for some insurers they may be billed the same as an in-person visit.  Please reach out to your insurance provider with any questions.    If during the course of the call the physician/provider feels a telephone visit is not appropriate, you will not be charged for this service.\"    Patient has given verbal consent for Telephone visit?  Yes    What phone number would you like to be contacted at? [phone]    How would you like to obtain your AVS? MyChart    Name: Yasmine Sutton (Last seen 4/18/2019).  F/u for   Chief Complaint   Patient presents with     Thyroid Disease     RECHECK     Osteopenia     HPI:  Yasmine Sutton is a 65 year old female who presents via phone visit for the management of hypothyroidism.      Initially diagnosed with hyperthyroidism/Graves' disease in 2000 or 2001.   Was treated with oral medication with remission of the hyperthyroidism.    She subsequently became hypothyroid and was started on Synthroid.    Feels " best on name brand Synthroid compared with generic levothyroxine.    Currently treated with Synthroid (GIORGI) 137 mcg/day..    Reports good energy, weight has decreased 171 lbs--> 167 lbs.  She struggles with heat intolerance but this has been ongoing and is stable; also struggles with constipation off and on but no recent changes.    Partial hysterectomy 12/2014.     2.  Goiter.  Thyroid US showed stable goiter, no nodules.    3.  Osteopenia.  Previously taking over the counter vitamin D but currently not taking any supplements - forgot her vitamin D tablets in [place].   PMH/PSH:  Past Medical History:   Diagnosis Date     Abnormal Pap smear, can't excl hi gd sq intraepithelial lesion (ASC-H) 9/2014    + HPV 31     Cervical high risk HPV (human papillomavirus) test positive 11/2011, 11/2012    + HPV 31, NIL pap, '13colp:JORDIN I & II & III     Depression, major      Major depressive disorder, recurrent episode, in full remission (H) 10/20/2017     PONV (postoperative nausea and vomiting)      Toxic diffuse goiter without mention of thyrotoxic crisis or storm 1991    resolved     Past Surgical History:   Procedure Laterality Date     ABDOMEN SURGERY      Abdominoplasty     APPENDECTOMY       CYSTOSCOPY N/A 12/9/2014    Procedure: CYSTOSCOPY;  Surgeon: Joy Monterroso DO;  Location: [facility] OR     DAVINCI HYSTERECTOMY TOTAL, BILATERAL SALPINGO-OOPHORECTOMY, COMBINED Bilateral 12/9/2014    Procedure: COMBINED DAVINCI HYSTERECTOMY TOTAL, SALPINGO-OOPHORECTOMY;  Surgeon: Joy Monterroso DO;  Location: [facility] OR     HC EMBOLIZATION UTERINE FIBROID  2007     HC REMOVAL GALLBLADDER      Cholecystectomy     LEEP TX, CERVICAL  9/2013    JORDIN III     Family Hx:  Family History   Problem Relation Age of Onset     Heart Disease Mother         MI     Respiratory Mother         copd     Thyroid Disease Mother         hypothyroid     Gastrointestinal Disease Mother         ulcer disease     Depression Mother      Gastrointestinal  Disease Father         ulcer disease     C.A.D. Father         MI @ 69 (smoker, EtOH)     Depression Father      Diabetes Brother      Thyroid Disease Brother         hypothyroid     Thyroid Disease Paternal Aunt         hypothyroid     Thyroid Disease Sister      Thyroid Disease Sister      Thyroid disease: Yes - mother, two sisters, one brother         DM2: No         Autoimmune: DM1, SLE, RA, Vitiligo: Yes - brother with type 1 diabetes    Social Hx:  Social History     Socioeconomic History     Marital status:      Spouse name: Not on file     Number of children: 4     Years of education: Not on file     Highest education level: Not on file   Occupational History     Comment: retired   Social Needs     Financial resource strain: Not on file     Food insecurity     Worry: Not on file     Inability: Not on file     Transportation needs     Medical: Not on file     Non-medical: Not on file   Tobacco Use     Smoking status: Former Smoker     Packs/day: 0.50     Years: 20.00     Pack years: 10.00     Quit date: 2013     Years since quittin.8     Smokeless tobacco: Former User     Tobacco comment: e cig for a short time   Substance and Sexual Activity     Alcohol use: Yes     Alcohol/week: 5.0 standard drinks     Types: 5 Standard drinks or equivalent per week     Comment: 10 drinks on the weekends     Drug use: No     Sexual activity: Yes     Birth control/protection: Post-menopausal   Lifestyle     Physical activity     Days per week: Not on file     Minutes per session: Not on file     Stress: Not on file   Relationships     Social connections     Talks on phone: Not on file     Gets together: Not on file     Attends Voodoo service: Not on file     Active member of club or organization: Not on file     Attends meetings of clubs or organizations: Not on file     Relationship status: Not on file     Intimate partner violence     Fear of current or ex partner: Not on file     Emotionally abused: Not  on file     Physically abused: Not on file     Forced sexual activity: Not on file   Other Topics Concern      Service No     Blood Transfusions No     Caffeine Concern No     Occupational Exposure No     Hobby Hazards No     Sleep Concern No     Stress Concern No     Weight Concern No     Special Diet No     Back Care No     Exercise No     Bike Helmet No     Seat Belt Yes     Self-Exams Not Asked     Parent/sibling w/ CABG, MI or angioplasty before 65F 55M? No   Social History Narrative    , NO CHILDREN.  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[employer]          MEDICATIONS:  has a current medication list which includes the following prescription(s): levothyroxine, albuterol, liothyronine, and magnesium oxide.    ROS   ROS: 10 point ROS neg other than the symptoms noted above in the HPI.    Vitals:  No vitals were obtained today due to virtual visit.    healthy, alert and no distress  PSYCH: Alert and oriented times 3; coherent speech, normal   rate and volume, able to articulate logical thoughts, able   to abstract reason, no tangential thoughts, no hallucinations   or delusions  Her affect is normal and pleasant  RESP: No cough, no audible wheezing, able to talk in full sentences  Remainder of exam unable to be completed due to telephone visits    LABS:  TFTs:  ENDO THYROID LABS-[facility] Latest Ref Rng & Units 6/17/2019 3/12/2019   TSH 0.40 - 4.00 mU/L 1.28 1.57   T4 TOTAL 4.5 - 12.5 MCG/DL     T4 FREE 0.76 - 1.46 ng/dL 1.23    T3, FREE 230 - 420 pg/dL     FREE T3 2.3 - 4.2 pg/mL 2.8      ENDO THYROID LABS-[facility] Latest Ref Rng & Units 8/16/2018 5/9/2017   TSH 0.40 - 4.00 mU/L 4.28 (H) 1.23   T4 TOTAL 4.5 - 12.5 MCG/DL     T4 FREE 0.76 - 1.46 ng/dL 1.18 1.14   T3, FREE 230 - 420 pg/dL     FREE T3 2.3 - 4.2 pg/mL       ENDO THYROID LABS-[facility] Latest Ref Rng & Units 6/13/2016 3/11/2016   TSH 0.40 - 4.00 mU/L 1.80 0.23 (L)   T4 TOTAL 4.5 - 12.5 MCG/DL     T4 FREE 0.76 - 1.46 ng/dL 1.22 1.38   T3, FREE 230 - 420 pg/dL      FREE T3 2.3 - 4.2 pg/mL       ENDO THYROID LABS-[facility] Latest Ref Rng & Units 8/31/2015   TSH 0.40 - 4.00 mU/L 4.69 (H)   T4 TOTAL 4.5 - 12.5 MCG/DL    T4 FREE 0.76 - 1.46 ng/dL 1.15   T3, FREE 230 - 420 pg/dL    FREE T3 2.3 - 4.2 pg/mL 2.2 (L)     TG/TPO:  Component    Latest Ref Rng 4/5/2001 1/6/2009   Thyroid Peroxidase Antibody    <35 IU/mL  82530 (H)   Thyroid Peroxidase Antibody    <2 UNITS/ML >70 H    Thyroglobulin Antibody    <2 UNITS/ML 14 (H)      Component    Latest Ref Rng 8/31/2015   FSH     88.8     Component      Latest Ref Rng & Units 5/9/2017   Vitamin D Deficiency screening      20 - 75 ug/L 74       ULTRASOUND THYROID 9/9/2015    HISTORY: Unspecified hypothyroidism.  FINDINGS: Thyroid ultrasound demonstrates an enlarged thyroid gland.  The right lobe measures 5.1 x 3.0 x 2.5 cm. The left lobe measures 6.3  x 2.2 x 2.8 cm. The isthmus is normal in thickness. Thyroid parenchyma  is heterogeneous in echotexture. Increased color Doppler flow is  noted within the gland.  Thyroid nodules as follows:   Right Lobe: None.  Isthmus: None.  Left Lobe: None.  IMPRESSION  IMPRESSION: Enlarged thyroid gland with heterogeneous echotexture. No  discrete thyroid nodules are appreciated. Increased color Doppler  flow is noted throughout the gland, likely indicating underlying  Thyroiditis.    DEXA 9/8/2015  FINDINGS:  Lumbar Spine (L1-L4) T-score: 1.4  Left Femoral Neck T-score: -0.4  Right Femoral Neck T-score: -1.6    Lumbar (L1-L4) BMD: 1.373 Previous: 1.530   Total Hip Mean BMD: 0.961 Previous: 1.075    Comparison is made to another DXA performed on a different Fieldglass machine on 12/27/2006.       IMPRESSION  Osteopenia (low bone mass)  Degenerative changes of the spine  Recommendations include ensuring adequate daily Calcium and Vitamin D intake  Follow up scan can be considered in three to five years.    Comparisons from different scanners that have not been cross calibrated, are not necessarily valid. Such a  comparison has been performed here; one should interpret with caution.  Compared to previous bone densitometry performed on this patient, there is the suggestion of a possible trend towards worsening of the lumbar spine, and a possible trend towards worsening of the total hip.    All pertinent notes, labs, and images personally reviewed by me.     A/P  Ms.Linda PANCHO Sutton is a 65 year old evaluated via phone visit for the management of hypothyroidism:    1. Hypothyroidism.  Currently treated with Synthroid (GIORGI) 137 mcg daily .    Past history of Graves' disease, + significantly elevated TPO antibodies consistent with Hashimoto's.    Obtain TFT's. Adjust Synthroid dose if indicated depending on lab results.    2.  Osteopenia.    Recommend daily calcium intake 1200 mg/day.    Currently eats 3-4 servings of dairy per day.  Takes no additional calcium supplements.  Restart vitamin D 2000 IU per day.    Labs ordered today:   Orders Placed This Encounter   Procedures     TSH with free T4 reflex       Follow-up:  One year, prn sooner if needed.    Yasmine Rodriguez NP  Endocrinology  [facility]  CC: Elvia Boles    Phone call duration:  14 minutes.  Call start time: 2:34 pm; call end time: 2:48 pm.      Again, thank you for allowing me to participate in the care of your patient.        Sincerely,        WYATT Molina CNP

## 2020-11-03 NOTE — PATIENT INSTRUCTIONS
Yasmine,    You can see Dr. Leland العراقي for follow up in one year - he works out of the [facility].    Yasmine Rodriguez NP  Endocrinology

## 2020-11-18 DIAGNOSIS — E03.9 ACQUIRED HYPOTHYROIDISM: ICD-10-CM

## 2020-11-18 LAB — TSH SERPL DL<=0.005 MIU/L-ACNC: 1.5 MU/L (ref 0.4–4)

## 2020-11-18 PROCEDURE — 36415 COLL VENOUS BLD VENIPUNCTURE: CPT | Performed by: CLINICAL NURSE SPECIALIST

## 2020-11-18 PROCEDURE — 84443 ASSAY THYROID STIM HORMONE: CPT | Performed by: CLINICAL NURSE SPECIALIST

## 2020-11-21 RX ORDER — LEVOTHYROXINE SODIUM 137 UG/1
137 TABLET ORAL DAILY
Qty: 90 TABLET | Refills: 3 | Status: SHIPPED | OUTPATIENT
Start: 2020-11-21 | End: 2021-12-20

## 2020-11-21 NOTE — RESULT ENCOUNTER NOTE
Yasmine,  Your thyroid test (TSH) is in normal range.  Please continue the current dose of Synthroid.  I'll renew your prescription.  Let me know if you have any questions or concerns.  Otherwise, have a nice holiday season!  Yasmine Rodriguez NP  Endocrinology

## 2021-01-09 ENCOUNTER — HEALTH MAINTENANCE LETTER (OUTPATIENT)
Age: 66
End: 2021-01-09

## 2021-05-20 ENCOUNTER — OFFICE VISIT (OUTPATIENT)
Dept: FAMILY MEDICINE | Facility: CLINIC | Age: 66
End: 2021-05-20
Payer: COMMERCIAL

## 2021-05-20 VITALS
RESPIRATION RATE: 16 BRPM | TEMPERATURE: 97.9 F | SYSTOLIC BLOOD PRESSURE: 126 MMHG | DIASTOLIC BLOOD PRESSURE: 72 MMHG | WEIGHT: 163 LBS | BODY MASS INDEX: 28.88 KG/M2 | HEIGHT: 63 IN | OXYGEN SATURATION: 96 % | HEART RATE: 67 BPM

## 2021-05-20 DIAGNOSIS — Z13.220 SCREENING, LIPID: ICD-10-CM

## 2021-05-20 DIAGNOSIS — E03.9 ACQUIRED HYPOTHYROIDISM: Primary | ICD-10-CM

## 2021-05-20 DIAGNOSIS — Z13.1 SCREENING FOR DIABETES MELLITUS: ICD-10-CM

## 2021-05-20 DIAGNOSIS — Z13.0 SCREENING FOR BLOOD DISEASE: ICD-10-CM

## 2021-05-20 DIAGNOSIS — Z12.31 VISIT FOR SCREENING MAMMOGRAM: ICD-10-CM

## 2021-05-20 DIAGNOSIS — R00.2 PALPITATIONS: ICD-10-CM

## 2021-05-20 DIAGNOSIS — E03.9 ACQUIRED HYPOTHYROIDISM: ICD-10-CM

## 2021-05-20 DIAGNOSIS — Z00.00 ROUTINE GENERAL MEDICAL EXAMINATION AT A HEALTH CARE FACILITY: ICD-10-CM

## 2021-05-20 DIAGNOSIS — J45.20 MILD INTERMITTENT REACTIVE AIRWAY DISEASE WITHOUT COMPLICATION: ICD-10-CM

## 2021-05-20 DIAGNOSIS — Z11.4 SCREENING FOR HIV (HUMAN IMMUNODEFICIENCY VIRUS): ICD-10-CM

## 2021-05-20 LAB
ERYTHROCYTE [DISTWIDTH] IN BLOOD BY AUTOMATED COUNT: 14.1 % (ref 10–15)
HCT VFR BLD AUTO: 42.7 % (ref 35–47)
HGB BLD-MCNC: 13.9 G/DL (ref 11.7–15.7)
MCH RBC QN AUTO: 30.9 PG (ref 26.5–33)
MCHC RBC AUTO-ENTMCNC: 32.6 G/DL (ref 31.5–36.5)
MCV RBC AUTO: 95 FL (ref 78–100)
PLATELET # BLD AUTO: 269 10E9/L (ref 150–450)
RBC # BLD AUTO: 4.5 10E12/L (ref 3.8–5.2)
WBC # BLD AUTO: 5.2 10E9/L (ref 4–11)

## 2021-05-20 PROCEDURE — 85027 COMPLETE CBC AUTOMATED: CPT | Performed by: PHYSICIAN ASSISTANT

## 2021-05-20 PROCEDURE — 80061 LIPID PANEL: CPT | Performed by: PHYSICIAN ASSISTANT

## 2021-05-20 PROCEDURE — 80053 COMPREHEN METABOLIC PANEL: CPT | Performed by: PHYSICIAN ASSISTANT

## 2021-05-20 PROCEDURE — 36415 COLL VENOUS BLD VENIPUNCTURE: CPT | Performed by: PHYSICIAN ASSISTANT

## 2021-05-20 PROCEDURE — 99396 PREV VISIT EST AGE 40-64: CPT | Performed by: PHYSICIAN ASSISTANT

## 2021-05-20 PROCEDURE — 84443 ASSAY THYROID STIM HORMONE: CPT | Performed by: PHYSICIAN ASSISTANT

## 2021-05-20 RX ORDER — ALBUTEROL SULFATE 90 UG/1
2 AEROSOL, METERED RESPIRATORY (INHALATION) EVERY 4 HOURS PRN
Qty: 18 G | Refills: 1 | Status: SHIPPED | OUTPATIENT
Start: 2021-05-20 | End: 2022-05-03

## 2021-05-20 ASSESSMENT — ENCOUNTER SYMPTOMS
BREAST MASS: 0
ARTHRALGIAS: 1

## 2021-05-20 ASSESSMENT — PATIENT HEALTH QUESTIONNAIRE - PHQ9: SUM OF ALL RESPONSES TO PHQ QUESTIONS 1-9: 3

## 2021-05-20 ASSESSMENT — MIFFLIN-ST. JEOR: SCORE: 1258.49

## 2021-05-20 NOTE — PROGRESS NOTES
SUBJECTIVE:   CC: Yasmine Sutton is an 65 year old woman who presents for preventive health visit.       Patient has been advised of split billing requirements and indicates understanding: Yes  Healthy Habits:     Getting at least 3 servings of Calcium per day:  Yes    Bi-annual eye exam:  Yes    Dental care twice a year:  NO    Sleep apnea or symptoms of sleep apnea:  Daytime drowsiness    Diet:  Carbohydrate counting and Gluten-free/reduced    Frequency of exercise:  2-3 days/week    Duration of exercise:  30-45 minutes    Taking medications regularly:  Yes    Medication side effects:  None    PHQ-2 Total Score: 0    Additional concerns today:  No              Today's PHQ-2 Score:   PHQ-2 (  [employer]) 2021   Q1: Little interest or pleasure in doing things 0   Q2: Feeling down, depressed or hopeless 0   PHQ-2 Score 0   Q1: Little interest or pleasure in doing things Not at all   Q2: Feeling down, depressed or hopeless Not at all   PHQ-2 Score 0       Abuse: Current or Past (Physical, Sexual or Emotional) - No  Do you feel safe in your environment? Yes    Have you ever done Advance Care Planning? (For example, a Health Directive, POLST, or a discussion with a medical provider or your loved ones about your wishes): No, advance care planning information given to patient to review.  Patient declined advance care planning discussion at this time.    Social History     Tobacco Use     Smoking status: Former Smoker     Packs/day: 0.50     Years: 20.00     Pack years: 10.00     Quit date: 2013     Years since quittin.3     Smokeless tobacco: Former User     Tobacco comment: e cig for a short time   Substance Use Topics     Alcohol use: Yes     Alcohol/week: 5.0 standard drinks     Types: 5 Standard drinks or equivalent per week     Comment: 10 drinks on the weekends     If you drink alcohol do you typically have >3 drinks per day or >7 drinks per week? No    Alcohol Use 2021   Prescreen: >3  drinks/day or >7 drinks/week? Not Applicable   Prescreen: >3 drinks/day or >7 drinks/week? -   AUDIT SCORE  -       Reviewed orders with patient.  Reviewed health maintenance and updated orders accordingly - Yes  BP Readings from Last 3 Encounters:   05/20/21 126/72   10/03/19 123/70   04/18/19 130/84    Wt Readings from Last 3 Encounters:   05/20/21 73.9 kg (163 lb)   10/03/19 77.9 kg (171 lb 12.8 oz)   04/18/19 77.6 kg (171 lb)                  Current Outpatient Medications   Medication Sig Dispense Refill     albuterol (PROAIR HFA/PROVENTIL HFA/VENTOLIN HFA) 108 (90 Base) MCG/ACT inhaler Inhale 2 puffs into the lungs every 4 hours as needed for shortness of breath / dyspnea or wheezing 18 g 1     albuterol (PROAIR HFA/PROVENTIL HFA/VENTOLIN HFA) 108 (90 Base) MCG/ACT inhaler Inhale 2 puffs into the lungs every 4 hours as needed for shortness of breath / dyspnea or wheezing 1 Inhaler 0     levothyroxine (SYNTHROID) 137 MCG tablet Take 1 tablet (137 mcg) by mouth daily . Dispense name brand Synthroid only, no generic 90 tablet 3     MAGNESIUM OXIDE PO Take 400 mg by mouth daily        Recent Labs   Lab Test 05/20/21  1057 11/18/20  0936 03/12/19  0806 03/12/19  0806 06/10/15  1202 06/10/15  1202 07/23/14  0926 07/23/14  0926   *  --   --  113*  --   --   --  140*     --   --  103  --   --   --  84   TRIG 112  --   --  84  --   --   --  120   ALT 19  --   --  18  --  19  --  11   CR 0.72  --   --  0.70  --  0.78  --  0.88   GFRESTIMATED 89  --   --  >90  --  75  --  73   GFRESTBLACK >90  --   --  >90  --  >90  African American GFR Calc     < >  --    POTASSIUM 4.1  --   --  4.4  --  4.1  --  4.3   TSH 0.75 1.50   < > 1.57   < >  --    < >  --     < > = values in this interval not displayed.        Breast Cancer Screening:  Any new diagnosis of family breast, ovarian, or bowel cancer?     FSH-7: No flowsheet data found.      Pertinent mammograms are reviewed under the imaging tab.    History of  abnormal Pap smear: Status post benign hysterectomy. Health Maintenance and Surgical History updated.  PAP / HPV 9/22/2014 3/10/2014 11/27/2012   PAP ASC-H(A) NIL NIL   PAP DATE - [facility] - - -     Reviewed and updated as needed this visit by clinical staff                 Reviewed and updated as needed this visit by Provider                    Review of Systems   Breasts:  Negative for tenderness, breast mass and discharge.   Genitourinary: Negative for pelvic pain, vaginal bleeding and vaginal discharge.   Musculoskeletal: Positive for arthralgias.     CONSTITUTIONAL: NEGATIVE for fever, chills, change in weight  INTEGUMENTARY/SKIN: NEGATIVE for worrisome rashes, moles or lesions  EYES: NEGATIVE for vision changes or irritation  ENT: NEGATIVE for ear, mouth and throat problems  RESP: NEGATIVE for significant cough or SOB  BREAST: NEGATIVE for masses, tenderness or discharge  CV: NEGATIVE for chest pain, palpitations or peripheral edema  GI: NEGATIVE for nausea, abdominal pain, heartburn, or change in bowel habits  : NEGATIVE for unusual urinary or vaginal symptoms. No vaginal bleeding.  MUSCULOSKELETAL: NEGATIVE for significant arthralgias or myalgia  NEURO: NEGATIVE for weakness, dizziness or paresthesias  PSYCHIATRIC: NEGATIVE for changes in mood or affect      OBJECTIVE:   LMP 05/08/2008   Physical Exam  GENERAL: healthy, alert and no distress  EYES: Eyes grossly normal to inspection, PERRL and conjunctivae and sclerae normal  HENT: ear canals and TM's normal, nose and mouth without ulcers or lesions  NECK: no adenopathy, no asymmetry, masses, or scars and thyroid normal to palpation  RESP: lungs clear to auscultation - no rales, rhonchi or wheezes  BREAST: normal without masses, tenderness or nipple discharge and no palpable axillary masses or adenopathy  CV: regular rate and rhythm, normal S1 S2, no S3 or S4, no murmur, click or rub, no peripheral edema and peripheral pulses strong  ABDOMEN: soft, nontender,  "no hepatosplenomegaly, no masses and bowel sounds normal  MS: no gross musculoskeletal defects noted, no edema  SKIN: no suspicious lesions or rashes  NEURO: Normal strength and tone, mentation intact and speech normal  PSYCH: mentation appears normal, affect normal/bright    Diagnostic Test Results:  Labs reviewed in Epic    ASSESSMENT/PLAN:   1. Acquired hypothyroidism    - TSH with free T4 reflex; Future    2. Screening for blood disease    - CBC with platelets; Future    3. Screening for diabetes mellitus    - Comprehensive metabolic panel; Future    4. Screening, lipid    - Lipid panel reflex to direct LDL Fasting; Future    5. Routine general medical examination at a health care facility      6. Screening for HIV (human immunodeficiency virus)      7. Palpitations  States has strong family history of CAD and has noticed she gets palpitations and SOB at times when active   - Echocardiogram Exercise Stress; Future    8. Visit for screening mammogram    - MA Screen Bilateral w/Thaddeus; Future    9. Mild intermittent reactive airway disease without complication    - albuterol (PROAIR HFA/PROVENTIL HFA/VENTOLIN HFA) 108 (90 Base) MCG/ACT inhaler; Inhale 2 puffs into the lungs every 4 hours as needed for shortness of breath / dyspnea or wheezing  Dispense: 18 g; Refill: 1    Patient has been advised of split billing requirements and indicates understanding: Yes  COUNSELING:  Reviewed preventive health counseling, as reflected in patient instructions       Regular exercise       Healthy diet/nutrition       Vision screening       Hearing screening       Aspirin prophylaxis    Estimated body mass index is 30.43 kg/m  as calculated from the following:    Height as of 10/3/19: 1.6 m (5' 3\").    Weight as of 10/3/19: 77.9 kg (171 lb 12.8 oz).        She reports that she quit smoking about 8 years ago. She has a 10.00 pack-year smoking history. She has quit using smokeless tobacco.      Counseling Resources:  ATP IV " Guidelines  Pooled Cohorts Equation Calculator  Breast Cancer Risk Calculator  BRCA-Related Cancer Risk Assessment: FHS-7 Tool  FRAX Risk Assessment  ICSI Preventive Guidelines  Dietary Guidelines for Americans, 2010  USDA's MyPlate  ASA Prophylaxis  Lung CA Screening    Ramona Ann Aaseby-Aguilera, PA-C  [facility]

## 2021-05-21 LAB
ALBUMIN SERPL-MCNC: 3.6 G/DL (ref 3.4–5)
ALP SERPL-CCNC: 68 U/L (ref 40–150)
ALT SERPL W P-5'-P-CCNC: 19 U/L (ref 0–50)
ANION GAP SERPL CALCULATED.3IONS-SCNC: 6 MMOL/L (ref 3–14)
AST SERPL W P-5'-P-CCNC: 17 U/L (ref 0–45)
BILIRUB SERPL-MCNC: 0.4 MG/DL (ref 0.2–1.3)
BUN SERPL-MCNC: 13 MG/DL (ref 7–30)
CALCIUM SERPL-MCNC: 9.4 MG/DL (ref 8.5–10.1)
CHLORIDE SERPL-SCNC: 104 MMOL/L (ref 94–109)
CHOLEST SERPL-MCNC: 248 MG/DL
CO2 SERPL-SCNC: 27 MMOL/L (ref 20–32)
CREAT SERPL-MCNC: 0.72 MG/DL (ref 0.52–1.04)
GFR SERPL CREATININE-BSD FRML MDRD: 89 ML/MIN/{1.73_M2}
GLUCOSE SERPL-MCNC: 93 MG/DL (ref 70–99)
HDLC SERPL-MCNC: 103 MG/DL
LDLC SERPL CALC-MCNC: 123 MG/DL
NONHDLC SERPL-MCNC: 145 MG/DL
POTASSIUM SERPL-SCNC: 4.1 MMOL/L (ref 3.4–5.3)
PROT SERPL-MCNC: 7.8 G/DL (ref 6.8–8.8)
SODIUM SERPL-SCNC: 137 MMOL/L (ref 133–144)
TRIGL SERPL-MCNC: 112 MG/DL
TSH SERPL DL<=0.005 MIU/L-ACNC: 0.75 MU/L (ref 0.4–4)

## 2021-07-08 ENCOUNTER — TELEPHONE (OUTPATIENT)
Dept: FAMILY MEDICINE | Facility: CLINIC | Age: 66
End: 2021-07-08

## 2021-07-08 NOTE — TELEPHONE ENCOUNTER
Patient Quality Outreach      Summary:    Patient has the following on her problem list/HM:   Asthma review     No flowsheet data found.       Patient is due/failing the following:   ACT needed and AAP    Type of outreach:    Phone, spoke to patient/parent. patient states at this time that her insurance is bad and does not want to encure any charges. states that she will have better in october and try back then    Questions for provider review:    None                                                                                                                                     Jarvis Ferreira [facility]           Chart routed to none.

## 2021-10-23 ENCOUNTER — HEALTH MAINTENANCE LETTER (OUTPATIENT)
Age: 66
End: 2021-10-23

## 2021-11-05 NOTE — TELEPHONE ENCOUNTER
LIOTHYRONINE SOD TABS 5MCG       Last Written Prescription Date: 07/07/16  Last Quantity: 90, # refills: 3  Last Office Visit with [facility] or [facility] prescribing provider: 05/09/17 Yasmine Rodriguez         TSH   Date Value Ref Range Status   05/09/2017 1.23 0.40 - 4.00 mU/L Final        Topical Clindamycin Pregnancy And Lactation Text: This medication is Pregnancy Category B and is considered safe during pregnancy. It is unknown if it is excreted in breast milk.

## 2021-12-16 DIAGNOSIS — E03.9 ACQUIRED HYPOTHYROIDISM: ICD-10-CM

## 2021-12-20 RX ORDER — LEVOTHYROXINE SODIUM 137 MCG
TABLET ORAL
Qty: 90 TABLET | Refills: 1 | Status: SHIPPED | OUTPATIENT
Start: 2021-12-20 | End: 2022-05-03

## 2021-12-20 NOTE — TELEPHONE ENCOUNTER
Prescription approved per [facility] Refill Protocol.  Elvia Kelley RN   TSH   Date Value Ref Range Status   05/20/2021 0.75 0.40 - 4.00 mU/L Final

## 2022-04-09 ENCOUNTER — HEALTH MAINTENANCE LETTER (OUTPATIENT)
Age: 67
End: 2022-04-09

## 2022-05-03 ENCOUNTER — OFFICE VISIT (OUTPATIENT)
Dept: FAMILY MEDICINE | Facility: CLINIC | Age: 67
End: 2022-05-03
Payer: MEDICARE

## 2022-05-03 VITALS
SYSTOLIC BLOOD PRESSURE: 118 MMHG | WEIGHT: 166.3 LBS | TEMPERATURE: 98.1 F | BODY MASS INDEX: 29.46 KG/M2 | HEART RATE: 61 BPM | RESPIRATION RATE: 16 BRPM | HEIGHT: 63 IN | OXYGEN SATURATION: 99 % | DIASTOLIC BLOOD PRESSURE: 78 MMHG

## 2022-05-03 DIAGNOSIS — F33.42 MAJOR DEPRESSIVE DISORDER, RECURRENT EPISODE, IN FULL REMISSION (H): ICD-10-CM

## 2022-05-03 DIAGNOSIS — E03.9 ACQUIRED HYPOTHYROIDISM: ICD-10-CM

## 2022-05-03 DIAGNOSIS — E03.8 OTHER SPECIFIED HYPOTHYROIDISM: ICD-10-CM

## 2022-05-03 DIAGNOSIS — Z00.00 ENCOUNTER FOR MEDICARE ANNUAL WELLNESS EXAM: Primary | ICD-10-CM

## 2022-05-03 DIAGNOSIS — Z13.1 SCREENING FOR DIABETES MELLITUS: ICD-10-CM

## 2022-05-03 DIAGNOSIS — Z12.31 VISIT FOR SCREENING MAMMOGRAM: ICD-10-CM

## 2022-05-03 DIAGNOSIS — Z82.49 FAMILY HISTORY OF ISCHEMIC HEART DISEASE: ICD-10-CM

## 2022-05-03 DIAGNOSIS — Z13.220 SCREENING, LIPID: ICD-10-CM

## 2022-05-03 DIAGNOSIS — Z13.0 SCREENING FOR BLOOD DISEASE: ICD-10-CM

## 2022-05-03 LAB
ALBUMIN SERPL-MCNC: 3.7 G/DL (ref 3.4–5)
ALP SERPL-CCNC: 72 U/L (ref 40–150)
ALT SERPL W P-5'-P-CCNC: 20 U/L (ref 0–50)
ANION GAP SERPL CALCULATED.3IONS-SCNC: 5 MMOL/L (ref 3–14)
AST SERPL W P-5'-P-CCNC: 17 U/L (ref 0–45)
BILIRUB SERPL-MCNC: 0.4 MG/DL (ref 0.2–1.3)
BUN SERPL-MCNC: 15 MG/DL (ref 7–30)
CALCIUM SERPL-MCNC: 9.7 MG/DL (ref 8.5–10.1)
CHLORIDE BLD-SCNC: 105 MMOL/L (ref 94–109)
CHOLEST SERPL-MCNC: 251 MG/DL
CO2 SERPL-SCNC: 26 MMOL/L (ref 20–32)
CREAT SERPL-MCNC: 0.73 MG/DL (ref 0.52–1.04)
ERYTHROCYTE [DISTWIDTH] IN BLOOD BY AUTOMATED COUNT: 13.4 % (ref 10–15)
FASTING STATUS PATIENT QL REPORTED: ABNORMAL
GFR SERPL CREATININE-BSD FRML MDRD: >90 ML/MIN/1.73M2
GLUCOSE BLD-MCNC: 98 MG/DL (ref 70–99)
HCT VFR BLD AUTO: 43.8 % (ref 35–47)
HDLC SERPL-MCNC: 106 MG/DL
HGB BLD-MCNC: 14.2 G/DL (ref 11.7–15.7)
LDLC SERPL CALC-MCNC: 125 MG/DL
MCH RBC QN AUTO: 30.3 PG (ref 26.5–33)
MCHC RBC AUTO-ENTMCNC: 32.4 G/DL (ref 31.5–36.5)
MCV RBC AUTO: 93 FL (ref 78–100)
NONHDLC SERPL-MCNC: 145 MG/DL
PLATELET # BLD AUTO: 253 10E3/UL (ref 150–450)
POTASSIUM BLD-SCNC: 4.1 MMOL/L (ref 3.4–5.3)
PROT SERPL-MCNC: 7.9 G/DL (ref 6.8–8.8)
RBC # BLD AUTO: 4.69 10E6/UL (ref 3.8–5.2)
SODIUM SERPL-SCNC: 136 MMOL/L (ref 133–144)
TRIGL SERPL-MCNC: 100 MG/DL
TSH SERPL DL<=0.005 MIU/L-ACNC: 0.52 MU/L (ref 0.4–4)
WBC # BLD AUTO: 4.9 10E3/UL (ref 4–11)

## 2022-05-03 PROCEDURE — 80061 LIPID PANEL: CPT | Performed by: PHYSICIAN ASSISTANT

## 2022-05-03 PROCEDURE — G0402 INITIAL PREVENTIVE EXAM: HCPCS | Performed by: PHYSICIAN ASSISTANT

## 2022-05-03 PROCEDURE — 36415 COLL VENOUS BLD VENIPUNCTURE: CPT | Performed by: PHYSICIAN ASSISTANT

## 2022-05-03 PROCEDURE — 80053 COMPREHEN METABOLIC PANEL: CPT | Performed by: PHYSICIAN ASSISTANT

## 2022-05-03 PROCEDURE — 90471 IMMUNIZATION ADMIN: CPT | Performed by: PHYSICIAN ASSISTANT

## 2022-05-03 PROCEDURE — 84443 ASSAY THYROID STIM HORMONE: CPT | Performed by: PHYSICIAN ASSISTANT

## 2022-05-03 PROCEDURE — 90715 TDAP VACCINE 7 YRS/> IM: CPT | Performed by: PHYSICIAN ASSISTANT

## 2022-05-03 PROCEDURE — 85027 COMPLETE CBC AUTOMATED: CPT | Performed by: PHYSICIAN ASSISTANT

## 2022-05-03 RX ORDER — LEVOTHYROXINE SODIUM 137 MCG
137 TABLET ORAL DAILY
Qty: 90 TABLET | Refills: 3 | Status: SHIPPED | OUTPATIENT
Start: 2022-05-03 | End: 2023-05-12

## 2022-05-03 SDOH — HEALTH STABILITY: PHYSICAL HEALTH: ON AVERAGE, HOW MANY DAYS PER WEEK DO YOU ENGAGE IN MODERATE TO STRENUOUS EXERCISE (LIKE A BRISK WALK)?: 5 DAYS

## 2022-05-03 SDOH — ECONOMIC STABILITY: TRANSPORTATION INSECURITY
IN THE PAST 12 MONTHS, HAS LACK OF TRANSPORTATION KEPT YOU FROM MEETINGS, WORK, OR FROM GETTING THINGS NEEDED FOR DAILY LIVING?: NO

## 2022-05-03 SDOH — ECONOMIC STABILITY: FOOD INSECURITY: WITHIN THE PAST 12 MONTHS, THE FOOD YOU BOUGHT JUST DIDN'T LAST AND YOU DIDN'T HAVE MONEY TO GET MORE.: NEVER TRUE

## 2022-05-03 SDOH — HEALTH STABILITY: PHYSICAL HEALTH: ON AVERAGE, HOW MANY MINUTES DO YOU ENGAGE IN EXERCISE AT THIS LEVEL?: 0 MIN

## 2022-05-03 SDOH — ECONOMIC STABILITY: FOOD INSECURITY: WITHIN THE PAST 12 MONTHS, YOU WORRIED THAT YOUR FOOD WOULD RUN OUT BEFORE YOU GOT MONEY TO BUY MORE.: NEVER TRUE

## 2022-05-03 SDOH — ECONOMIC STABILITY: TRANSPORTATION INSECURITY
IN THE PAST 12 MONTHS, HAS THE LACK OF TRANSPORTATION KEPT YOU FROM MEDICAL APPOINTMENTS OR FROM GETTING MEDICATIONS?: NO

## 2022-05-03 SDOH — ECONOMIC STABILITY: INCOME INSECURITY: IN THE LAST 12 MONTHS, WAS THERE A TIME WHEN YOU WERE NOT ABLE TO PAY THE MORTGAGE OR RENT ON TIME?: NO

## 2022-05-03 SDOH — ECONOMIC STABILITY: INCOME INSECURITY: HOW HARD IS IT FOR YOU TO PAY FOR THE VERY BASICS LIKE FOOD, HOUSING, MEDICAL CARE, AND HEATING?: NOT HARD AT ALL

## 2022-05-03 ASSESSMENT — ANXIETY QUESTIONNAIRES
GAD7 TOTAL SCORE: 1
4. TROUBLE RELAXING: NOT AT ALL
2. NOT BEING ABLE TO STOP OR CONTROL WORRYING: NOT AT ALL
7. FEELING AFRAID AS IF SOMETHING AWFUL MIGHT HAPPEN: NOT AT ALL
5. BEING SO RESTLESS THAT IT IS HARD TO SIT STILL: NOT AT ALL
6. BECOMING EASILY ANNOYED OR IRRITABLE: SEVERAL DAYS
1. FEELING NERVOUS, ANXIOUS, OR ON EDGE: NOT AT ALL
3. WORRYING TOO MUCH ABOUT DIFFERENT THINGS: NOT AT ALL
GAD7 TOTAL SCORE: 1
GAD7 TOTAL SCORE: 1
7. FEELING AFRAID AS IF SOMETHING AWFUL MIGHT HAPPEN: NOT AT ALL

## 2022-05-03 ASSESSMENT — ENCOUNTER SYMPTOMS
CONSTIPATION: 0
PARESTHESIAS: 0
CHILLS: 0
ABDOMINAL PAIN: 0
FEVER: 0
NAUSEA: 0
FREQUENCY: 0
JOINT SWELLING: 1
DYSURIA: 0
HEADACHES: 0
HEARTBURN: 0
DIZZINESS: 0
MYALGIAS: 0
SHORTNESS OF BREATH: 0
HEMATURIA: 0
WEAKNESS: 0
DIARRHEA: 0
BREAST MASS: 0
ARTHRALGIAS: 1
COUGH: 0
NERVOUS/ANXIOUS: 1
PALPITATIONS: 0
EYE PAIN: 0
HEMATOCHEZIA: 0
SORE THROAT: 0

## 2022-05-03 ASSESSMENT — LIFESTYLE VARIABLES
HOW OFTEN DO YOU HAVE A DRINK CONTAINING ALCOHOL: PATIENT DECLINED
HOW OFTEN DO YOU HAVE SIX OR MORE DRINKS ON ONE OCCASION: PATIENT DECLINED
HOW MANY STANDARD DRINKS CONTAINING ALCOHOL DO YOU HAVE ON A TYPICAL DAY: 1 OR 2
SKIP TO QUESTIONS 9-10: 0
AUDIT-C TOTAL SCORE: -1

## 2022-05-03 ASSESSMENT — PATIENT HEALTH QUESTIONNAIRE - PHQ9
SUM OF ALL RESPONSES TO PHQ QUESTIONS 1-9: 0
SUM OF ALL RESPONSES TO PHQ QUESTIONS 1-9: 0
10. IF YOU CHECKED OFF ANY PROBLEMS, HOW DIFFICULT HAVE THESE PROBLEMS MADE IT FOR YOU TO DO YOUR WORK, TAKE CARE OF THINGS AT HOME, OR GET ALONG WITH OTHER PEOPLE: NOT DIFFICULT AT ALL

## 2022-05-03 ASSESSMENT — SOCIAL DETERMINANTS OF HEALTH (SDOH)
HOW OFTEN DO YOU ATTEND CHURCH OR RELIGIOUS SERVICES?: NEVER
DO YOU BELONG TO ANY CLUBS OR ORGANIZATIONS SUCH AS CHURCH GROUPS UNIONS, FRATERNAL OR ATHLETIC GROUPS, OR SCHOOL GROUPS?: NO
IN A TYPICAL WEEK, HOW MANY TIMES DO YOU TALK ON THE PHONE WITH FAMILY, FRIENDS, OR NEIGHBORS?: MORE THAN THREE TIMES A WEEK
HOW OFTEN DO YOU GET TOGETHER WITH FRIENDS OR RELATIVES?: MORE THAN THREE TIMES A WEEK

## 2022-05-03 ASSESSMENT — ACTIVITIES OF DAILY LIVING (ADL): CURRENT_FUNCTION: NO ASSISTANCE NEEDED

## 2022-05-03 NOTE — PROGRESS NOTES
"SUBJECTIVE:   Yasmine Sutton is a 66 year old female who presents for Preventive Visit.  Answers for HPI/ROS submitted by the patient on 5/3/2022  If you checked off any problems, how difficult have these problems made it for you to do your work, take care of things at home, or get along with other people?: Not difficult at all  PHQ9 TOTAL SCORE: 0  NELLY 7 TOTAL SCORE: 1    Patient has been advised of split billing requirements and indicates understanding: Yes  Are you in the first 12 months of your Medicare coverage?  Yes,  Visual Acuity:  Right Eye: 20/25   Left Eye: 20/25  Both Eyes: equal    Healthy Habits:     In general, how would you rate your overall health?  Good    Frequency of exercise:  6-7 days/week    Duration of exercise:  Other    Do you usually eat at least 4 servings of fruit and vegetables a day, include whole grains    & fiber and avoid regularly eating high fat or \"junk\" foods?  Yes    Taking medications regularly:  Yes    Medication side effects:  None    Ability to successfully perform activities of daily living:  No assistance needed    Home Safety:  No safety concerns identified    Hearing Impairment:  Difficulty following a conversation in a noisy restaurant or crowded room and need to ask people to speak up or repeat themselves    In the past 6 months, have you been bothered by leaking of urine?  No    In general, how would you rate your overall mental or emotional health?  Good      PHQ-2 Total Score: 0    Additional concerns today:  Yes    Do you feel safe in your environment? Yes    Have you ever done Advance Care Planning? (For example, a Health Directive, POLST, or a discussion with a medical provider or your loved ones about your wishes): No, advance care planning information given to patient to review.  Patient plans to discuss their wishes with loved ones or provider.        Right Ear:      1000 Hz RESPONSE- on Level: 40 db (Conditioning sound)   1000 Hz: RESPONSE- on Level:   20 db "    2000 Hz: RESPONSE- on Level:   20 db    4000 Hz: RESPONSE- on Level:   20 db     Left Ear:      4000 Hz: RESPONSE- on Level:   20 db    2000 Hz: RESPONSE- on Level:   20 db    1000 Hz: RESPONSE- on Level:   20 db     500 Hz: RESPONSE- on Level: 25 db    Right Ear:    500 Hz: RESPONSE- on Level: 25 db    Hearing Acuity: Pass    Hearing Assessment: normal   Fall risk     Cognitive Screening   1) Repeat 3 items (Leader, Season, Table)    2) Clock draw: NORMAL  3) 3 item recall: Recalls 1 object   Results: NORMAL clock, 1-2 items recalled: COGNITIVE IMPAIRMENT LESS LIKELY    Mini-CogTM Copyright S Alejo. Licensed by the author for use in [facility]; reprinted with permission (soob@[facility]). All rights reserved.      Do you have sleep apnea, excessive snoring or daytime drowsiness?: no    Reviewed and updated as needed this visit by clinical staff   Tobacco  Allergies  Meds   Med Hx  Surg Hx  Fam Hx  Soc Hx          Reviewed and updated as needed this visit by Provider                   Social History     Tobacco Use     Smoking status: Former Smoker     Packs/day: 0.50     Years: 20.00     Pack years: 10.00     Quit date: 2013     Years since quittin.3     Smokeless tobacco: Former User     Tobacco comment: e cig for a short time   Substance Use Topics     Alcohol use: Yes     Alcohol/week: 5.0 standard drinks     Types: 5 Standard drinks or equivalent per week     Comment: 10 drinks on the weekends       Alcohol Use 5/3/2022   Prescreen: >3 drinks/day or >7 drinks/week? No   Prescreen: >3 drinks/day or >7 drinks/week? -   AUDIT SCORE  -         Current providers sharing in care for this patient include:   Patient Care Team:  Aaseby-Aguilera, Ramona Ann, PA-C as Assigned PCP    The following health maintenance items are reviewed in Epic and correct as of today:  Health Maintenance Due   Topic Date Due     ASTHMA ACTION PLAN  Never done     ASTHMA CONTROL TEST  Never done     DEPRESSION  ACTION PLAN  Never done     DTAP/TDAP/TD IMMUNIZATION (1 - Tdap) 03/07/2006     ZOSTER IMMUNIZATION (1 of 2) Never done     LUNG CANCER SCREENING  Never done     MAMMO SCREENING  10/20/2019     FALL RISK ASSESSMENT  Never done     Pneumococcal Vaccine: 65+ Years (1 - PCV) Never done     COVID-19 Vaccine (3 - Booster for Moderna series) 10/27/2021     PHQ-9  11/20/2021     TSH W/FREE T4 REFLEX  05/20/2022     ANNUAL REVIEW OF HM ORDERS  05/20/2022     BP Readings from Last 3 Encounters:   05/03/22 118/78   05/20/21 126/72   10/03/19 123/70    Wt Readings from Last 3 Encounters:   05/03/22 75.4 kg (166 lb 4.8 oz)   05/20/21 73.9 kg (163 lb)   10/03/19 77.9 kg (171 lb 12.8 oz)                    Any new diagnosis of family breast, ovarian, or bowel cancer? No    FHS-7: No flowsheet data found.  click delete button to remove this line now  Mammogram Screening: Recommended mammography every 1-2 years with patient discussion and risk factor consideration  Pertinent mammograms are reviewed under the imaging tab.    Review of Systems   Constitutional: Negative for chills and fever.   HENT: Positive for hearing loss. Negative for congestion, ear pain and sore throat.    Eyes: Negative for pain and visual disturbance.   Respiratory: Negative for cough and shortness of breath.    Cardiovascular: Negative for chest pain, palpitations and peripheral edema.   Gastrointestinal: Negative for abdominal pain, constipation, diarrhea, heartburn, hematochezia and nausea.   Breasts:  Negative for tenderness, breast mass and discharge.   Genitourinary: Positive for pelvic pain. Negative for dysuria, frequency, genital sores, hematuria, urgency, vaginal bleeding and vaginal discharge.   Musculoskeletal: Positive for arthralgias and joint swelling. Negative for myalgias.   Skin: Negative for rash.   Neurological: Negative for dizziness, weakness, headaches and paresthesias.   Psychiatric/Behavioral: Positive for mood changes. The patient is  "nervous/anxious.      Constitutional, HEENT, cardiovascular, pulmonary, gi and gu systems are negative, except as otherwise noted.    OBJECTIVE:   /78   Pulse 61   Temp 98.1  F (36.7  C) (Oral)   Resp 16   Ht 1.6 m (5' 3\")   Wt 75.4 kg (166 lb 4.8 oz)   LMP 05/08/2008   SpO2 99%   BMI 29.46 kg/m   Estimated body mass index is 29.46 kg/m  as calculated from the following:    Height as of this encounter: 1.6 m (5' 3\").    Weight as of this encounter: 75.4 kg (166 lb 4.8 oz).  Physical Exam  GENERAL APPEARANCE: healthy, alert and no distress  EYES: Eyes grossly normal to inspection, PERRL and conjunctivae and sclerae normal  HENT: ear canals and TM's normal, nose and mouth without ulcers or lesions, oropharynx clear and oral mucous membranes moist  NECK: no adenopathy, no asymmetry, masses, or scars and thyroid normal to palpation  RESP: lungs clear to auscultation - no rales, rhonchi or wheezes  BREAST: normal without masses, tenderness or nipple discharge and no palpable axillary masses or adenopathy  CV: regular rate and rhythm, normal S1 S2, no S3 or S4, no murmur, click or rub, no peripheral edema and peripheral pulses strong  ABDOMEN: soft, nontender, no hepatosplenomegaly, no masses and bowel sounds normal  MS: no musculoskeletal defects are noted and gait is age appropriate without ataxia  SKIN: no suspicious lesions or rashes  NEURO: Normal strength and tone, sensory exam grossly normal, mentation intact and speech normal  PSYCH: mentation appears normal and affect normal/bright    Diagnostic Test Results:  Labs reviewed in Epic    ASSESSMENT / PLAN:   (Z00.00) Encounter for Medicare annual wellness exam  (primary encounter diagnosis)  Comment:   Plan:     (Z82.49) Family history of ischemic heart disease  Comment:   Plan: Echocardiogram Exercise Stress            (E03.8) Other specified hypothyroidism  Comment: stable symptosm   Plan: TSH WITH FREE T4 REFLEX            (Z13.1) Screening for " "diabetes mellitus  Comment:  Plan: Comprehensive metabolic panel            (Z13.220) Screening, lipid  Comment:   Plan: Lipid Profile            (Z13.0) Screening for blood disease  Comment:   Plan: CBC with platelets            (F33.42) Major depressive disorder, recurrent episode, in full remission (H)  Comment:   Plan: stable     (Z12.31) Visit for screening mammogram  Comment:   Plan: *MA Screening Digital Bilateral            (E03.9) Acquired hypothyroidism  Comment: stable   Plan: SYNTHROID 137 MCG tablet                  COUNSELING:  Reviewed preventive health counseling, as reflected in patient instructions       Regular exercise       Healthy diet/nutrition       Vision screening       Hearing screening    Estimated body mass index is 29.46 kg/m  as calculated from the following:    Height as of this encounter: 1.6 m (5' 3\").    Weight as of this encounter: 75.4 kg (166 lb 4.8 oz).    Weight management plan: Discussed healthy diet and exercise guidelines    She reports that she quit smoking about 9 years ago. She has a 10.00 pack-year smoking history. She has quit using smokeless tobacco.      Appropriate preventive services were discussed with this patient, including applicable screening as appropriate for cardiovascular disease, diabetes, osteopenia/osteoporosis, and glaucoma.  As appropriate for age/gender, discussed screening for colorectal cancer, prostate cancer, breast cancer, and cervical cancer. Checklist reviewing preventive services available has been given to the patient.    Reviewed patients plan of care and provided an AVS. The Basic Care Plan (routine screening as documented in Health Maintenance) for Yasmine meets the Care Plan requirement. This Care Plan has been established and reviewed with the Patient.    Counseling Resources:  ATP IV Guidelines  Pooled Cohorts Equation Calculator  Breast Cancer Risk Calculator  Breast Cancer: Medication to Reduce Risk  FRAX Risk Assessment  ICSI " Preventive Guidelines  Dietary Guidelines for Americans, 2010  USDA's MyPlate  ASA Prophylaxis  Lung CA Screening    Ramona Ann Aaseby-Aguilera, PA-C [facility]    Identified Health Risks:    The patient was provided with written information regarding signs of hearing loss.

## 2022-05-03 NOTE — PATIENT INSTRUCTIONS
The ASCVD Risk score ([place] SHERRY Hickman., et al., 2013) failed to calculate for the following reasons:    The valid HDL cholesterol range is 20 to 100 mg/dL              Patient Education   Personalized Prevention Plan  You are due for the preventive services outlined below.  Your care team is available to assist you in scheduling these services.  If you have already completed any of these items, please share that information with your care team to update in your medical record.  Health Maintenance Due   Topic Date Due    Asthma Action Plan - yearly  Never done    Asthma Control Test  Never done    Depression Action Plan  Never done    Diptheria Tetanus Pertussis (DTAP/TDAP/TD) Vaccine (1 - Tdap) 03/07/2006    Zoster (Shingles) Vaccine (1 of 2) Never done    LUNG CANCER SCREENING  Never done    Mammogram  10/20/2019    FALL RISK ASSESSMENT  Never done    Pneumococcal Vaccine (1 - PCV) Never done    COVID-19 Vaccine (3 - Booster for Moderna series) 10/27/2021    Depression Assessment  11/20/2021    Thyroid Function Lab  05/20/2022    ANNUAL REVIEW OF HM ORDERS  05/20/2022       Signs of Hearing Loss      Hearing much better with one ear can be a sign of hearing loss.   Hearing loss is a problem shared by many people. In fact, it is one of the most common health problems, particularly as people age. Most people age 65 and older have some hearing loss. By age 80, almost everyone does. Hearing loss often occurs slowly over the years. So you may not realize your hearing has gotten worse.  Have your hearing checked  Call your healthcare provider if you:  Have to strain to hear normal conversation  Have to watch other people s faces very carefully to follow what they re saying  Need to ask people to repeat what they ve said  Often misunderstand what people are saying  Turn the volume of the television or radio up so high that others complain  Feel that people are mumbling when they re talking to you  Find that the effort to  hear leaves you feeling tired and irritated  Notice, when using the phone, that you hear better with one ear than the other  [employer] last reviewed this educational content on 1/1/2020 2000-2021 [employer]. All rights reserved. This information is not intended as a substitute for professional medical care. Always follow your healthcare professional's instructions.

## 2022-05-04 ASSESSMENT — PATIENT HEALTH QUESTIONNAIRE - PHQ9: SUM OF ALL RESPONSES TO PHQ QUESTIONS 1-9: 0

## 2022-05-04 ASSESSMENT — ANXIETY QUESTIONNAIRES: GAD7 TOTAL SCORE: 1

## 2022-05-11 NOTE — RESULT ENCOUNTER NOTE
Anil Underwood,    I just wanted to let you know that your lab results have been reviewed and are attached.    - Ramona Aaseby-Aguilera, PA-C is currently out of the office. I am her partner.  - Your lab results look stable; everything is normal.    Please let me know if you have any questions and have a great week!    Sincerely,    Dolores Agrawal PA-C    [facility]  [address] [place] [address]  Clinic Phone: [phone]

## 2022-05-19 ENCOUNTER — TELEPHONE (OUTPATIENT)
Dept: FAMILY MEDICINE | Facility: CLINIC | Age: 67
End: 2022-05-19
Payer: MEDICARE

## 2022-05-19 NOTE — TELEPHONE ENCOUNTER
Pt calling in regarding results from 5/3/22, relayed provider result note attached. Pt verbalized understanding. Requested scheduling info for echo, number for scheduling given. No further questions.     Neel ALEMAN RN

## 2022-05-24 NOTE — TELEPHONE ENCOUNTER
Pt calling in regarding echo and mammo orders, states she is unable to schedule as the order was cancelled. Called and spoke with imaging scheduling, states they cannot see order placed for 5/3/22 for echo stress, states order was cancelled by someone, unsure why. Scheduling reinstated order and can schedule pt. Called and spoke with mammo, pt is able to schedule as order is okay that is on file. Echo and Mammo will reach out to pt to schedule. No further action needed in this encounter.     Neel ALEMAN RN

## 2022-06-02 ENCOUNTER — HOSPITAL ENCOUNTER (OUTPATIENT)
Dept: MAMMOGRAPHY | Facility: CLINIC | Age: 67
Discharge: HOME | End: 2022-06-02
Attending: PHYSICIAN ASSISTANT | Admitting: PHYSICIAN ASSISTANT
Payer: MEDICARE

## 2022-06-02 DIAGNOSIS — Z12.31 VISIT FOR SCREENING MAMMOGRAM: ICD-10-CM

## 2022-06-02 PROCEDURE — 77067 SCR MAMMO BI INCL CAD: CPT

## 2022-07-27 ENCOUNTER — DOCUMENTATION ONLY (OUTPATIENT)
Dept: FAMILY MEDICINE | Facility: CLINIC | Age: 67
End: 2022-07-27

## 2022-07-27 NOTE — TELEPHONE ENCOUNTER
Please be advised that we have attempted to reach this patient to schedule their cardiac stress testing however patient refused to schedule.  Pt states not covered by her insurance.   No further attempts to reach this patient will by made by scheduling team.  Please contact this patient to encourage them to call our office at [phone] and schedule this order if it is still clinically recommended.      Thank you for allowing [facility] to participate in this patients healthcare needs.

## 2022-10-09 ENCOUNTER — HEALTH MAINTENANCE LETTER (OUTPATIENT)
Age: 67
End: 2022-10-09

## 2023-05-05 NOTE — PROGRESS NOTES
"Yasmine Sutton is a 65 year old female who is being evaluated via a billable telephone visit.      The patient has been notified of following:     \"This telephone visit will be conducted via a call between you and your physician/provider. We have found that certain health care needs can be provided without the need for a physical exam.  This service lets us provide the care you need with a short phone conversation.  If a prescription is necessary we can send it directly to your pharmacy.  If lab work is needed we can place an order for that and you can then stop by our lab to have the test done at a later time.    Telephone visits are billed at different rates depending on your insurance coverage. During this emergency period, for some insurers they may be billed the same as an in-person visit.  Please reach out to your insurance provider with any questions.    If during the course of the call the physician/provider feels a telephone visit is not appropriate, you will not be charged for this service.\"    Patient has given verbal consent for Telephone visit?  Yes    What phone number would you like to be contacted at? [phone]    How would you like to obtain your AVS? MyChart    Name: Yasmine Sutton (Last seen 4/18/2019).  F/u for   Chief Complaint   Patient presents with     Thyroid Disease     RECHECK     Osteopenia     HPI:  Yasmine Sutton is a 65 year old female who presents via phone visit for the management of hypothyroidism.      Initially diagnosed with hyperthyroidism/Graves' disease in 2000 or 2001.   Was treated with oral medication with remission of the hyperthyroidism.    She subsequently became hypothyroid and was started on Synthroid.    Feels best on name brand Synthroid compared with generic levothyroxine.    Currently treated with Synthroid (GIORGI) 137 mcg/day..    Reports good energy, weight has decreased 171 lbs--> 167 lbs.  She struggles with heat intolerance but this has been ongoing and is " stable; also struggles with constipation off and on but no recent changes.    Partial hysterectomy 12/2014.     2.  Goiter.  Thyroid US showed stable goiter, no nodules.    3.  Osteopenia.  Previously taking over the counter vitamin D but currently not taking any supplements - forgot her vitamin D tablets in [place].   PMH/PSH:  Past Medical History:   Diagnosis Date     Abnormal Pap smear, can't excl hi gd sq intraepithelial lesion (ASC-H) 9/2014    + HPV 31     Cervical high risk HPV (human papillomavirus) test positive 11/2011, 11/2012    + HPV 31, NIL pap, '13colp:JORDIN I & II & III     Depression, major      Major depressive disorder, recurrent episode, in full remission (H) 10/20/2017     PONV (postoperative nausea and vomiting)      Toxic diffuse goiter without mention of thyrotoxic crisis or storm 1991    resolved     Past Surgical History:   Procedure Laterality Date     ABDOMEN SURGERY      Abdominoplasty     APPENDECTOMY       CYSTOSCOPY N/A 12/9/2014    Procedure: CYSTOSCOPY;  Surgeon: Joy Monterroso DO;  Location: [facility] OR     DAVINCI HYSTERECTOMY TOTAL, BILATERAL SALPINGO-OOPHORECTOMY, COMBINED Bilateral 12/9/2014    Procedure: COMBINED DAVINCI HYSTERECTOMY TOTAL, SALPINGO-OOPHORECTOMY;  Surgeon: Joy Monterroso DO;  Location: [facility] OR     HC EMBOLIZATION UTERINE FIBROID  2007     HC REMOVAL GALLBLADDER      Cholecystectomy     LEEP TX, CERVICAL  9/2013    JORDIN III     Family Hx:  Family History   Problem Relation Age of Onset     Heart Disease Mother         MI     Respiratory Mother         copd     Thyroid Disease Mother         hypothyroid     Gastrointestinal Disease Mother         ulcer disease     Depression Mother      Gastrointestinal Disease Father         ulcer disease     C.A.D. Father         MI @ 69 (smoker, EtOH)     Depression Father      Diabetes Brother      Thyroid Disease Brother         hypothyroid     Thyroid Disease Paternal Aunt         hypothyroid     Thyroid Disease Sister       Thyroid Disease Sister      Thyroid disease: Yes - mother, two sisters, one brother         DM2: No         Autoimmune: DM1, SLE, RA, Vitiligo: Yes - brother with type 1 diabetes    Social Hx:  Social History     Socioeconomic History     Marital status:      Spouse name: Not on file     Number of children: 4     Years of education: Not on file     Highest education level: Not on file   Occupational History     Comment: retired   Social Needs     Financial resource strain: Not on file     Food insecurity     Worry: Not on file     Inability: Not on file     Transportation needs     Medical: Not on file     Non-medical: Not on file   Tobacco Use     Smoking status: Former Smoker     Packs/day: 0.50     Years: 20.00     Pack years: 10.00     Quit date: 2013     Years since quittin.8     Smokeless tobacco: Former User     Tobacco comment: e cig for a short time   Substance and Sexual Activity     Alcohol use: Yes     Alcohol/week: 5.0 standard drinks     Types: 5 Standard drinks or equivalent per week     Comment: 10 drinks on the weekends     Drug use: No     Sexual activity: Yes     Birth control/protection: Post-menopausal   Lifestyle     Physical activity     Days per week: Not on file     Minutes per session: Not on file     Stress: Not on file   Relationships     Social connections     Talks on phone: Not on file     Gets together: Not on file     Attends [employer] service: Not on file     Active member of club or organization: Not on file     Attends meetings of clubs or organizations: Not on file     Relationship status: Not on file     Intimate partner violence     Fear of current or ex partner: Not on file     Emotionally abused: Not on file     Physically abused: Not on file     Forced sexual activity: Not on file   Other Topics Concern      Service No     Blood Transfusions No     Caffeine Concern No     Occupational Exposure No     Hobby Hazards No     Sleep Concern No      Stress Concern No     Weight Concern No     Special Diet No     Back Care No     Exercise No     Bike Helmet No     Seat Belt Yes     Self-Exams Not Asked     Parent/sibling w/ CABG, MI or angioplasty before 65F 55M? No   Social History Narrative    , NO CHILDREN.  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[employer]          MEDICATIONS:  has a current medication list which includes the following prescription(s): levothyroxine, albuterol, liothyronine, and magnesium oxide.    ROS   ROS: 10 point ROS neg other than the symptoms noted above in the HPI.    Vitals:  No vitals were obtained today due to virtual visit.    healthy, alert and no distress  PSYCH: Alert and oriented times 3; coherent speech, normal   rate and volume, able to articulate logical thoughts, able   to abstract reason, no tangential thoughts, no hallucinations   or delusions  Her affect is normal and pleasant  RESP: No cough, no audible wheezing, able to talk in full sentences  Remainder of exam unable to be completed due to telephone visits    LABS:  TFTs:  ENDO THYROID LABS-[facility] Latest Ref Rng & Units 6/17/2019 3/12/2019   TSH 0.40 - 4.00 mU/L 1.28 1.57   T4 TOTAL 4.5 - 12.5 MCG/DL     T4 FREE 0.76 - 1.46 ng/dL 1.23    T3, FREE 230 - 420 pg/dL     FREE T3 2.3 - 4.2 pg/mL 2.8      ENDO THYROID LABS-[facility] Latest Ref Rng & Units 8/16/2018 5/9/2017   TSH 0.40 - 4.00 mU/L 4.28 (H) 1.23   T4 TOTAL 4.5 - 12.5 MCG/DL     T4 FREE 0.76 - 1.46 ng/dL 1.18 1.14   T3, FREE 230 - 420 pg/dL     FREE T3 2.3 - 4.2 pg/mL       ENDO THYROID LABS-[facility] Latest Ref Rng & Units 6/13/2016 3/11/2016   TSH 0.40 - 4.00 mU/L 1.80 0.23 (L)   T4 TOTAL 4.5 - 12.5 MCG/DL     T4 FREE 0.76 - 1.46 ng/dL 1.22 1.38   T3, FREE 230 - 420 pg/dL     FREE T3 2.3 - 4.2 pg/mL       ENDO THYROID LABS-[facility] Latest Ref Rng & Units 8/31/2015   TSH 0.40 - 4.00 mU/L 4.69 (H)   T4 TOTAL 4.5 - 12.5 MCG/DL    T4 FREE 0.76 - 1.46 ng/dL 1.15   T3, FREE 230 - 420 pg/dL    FREE T3 2.3 - 4.2 pg/mL 2.2 (L)      TG/TPO:  Component    Latest Ref Rng 4/5/2001 1/6/2009   Thyroid Peroxidase Antibody    <35 IU/mL  49011 (H)   Thyroid Peroxidase Antibody    <2 UNITS/ML >70 H    Thyroglobulin Antibody    <2 UNITS/ML 14 (H)      Component    Latest Ref Rng 8/31/2015   FSH     88.8     Component      Latest Ref Rng & Units 5/9/2017   Vitamin D Deficiency screening      20 - 75 ug/L 74       ULTRASOUND THYROID 9/9/2015    HISTORY: Unspecified hypothyroidism.  FINDINGS: Thyroid ultrasound demonstrates an enlarged thyroid gland.  The right lobe measures 5.1 x 3.0 x 2.5 cm. The left lobe measures 6.3  x 2.2 x 2.8 cm. The isthmus is normal in thickness. Thyroid parenchyma  is heterogeneous in echotexture. Increased color Doppler flow is  noted within the gland.  Thyroid nodules as follows:   Right Lobe: None.  Isthmus: None.  Left Lobe: None.  IMPRESSION  IMPRESSION: Enlarged thyroid gland with heterogeneous echotexture. No  discrete thyroid nodules are appreciated. Increased color Doppler  flow is noted throughout the gland, likely indicating underlying  Thyroiditis.    DEXA 9/8/2015  FINDINGS:  Lumbar Spine (L1-L4) T-score: 1.4  Left Femoral Neck T-score: -0.4  Right Femoral Neck T-score: -1.6    Lumbar (L1-L4) BMD: 1.373 Previous: 1.530   Total Hip Mean BMD: 0.961 Previous: 1.075    Comparison is made to another DXA performed on a different ForeSee machine on 12/27/2006.       IMPRESSION  Osteopenia (low bone mass)  Degenerative changes of the spine  Recommendations include ensuring adequate daily Calcium and Vitamin D intake  Follow up scan can be considered in three to five years.    Comparisons from different scanners that have not been cross calibrated, are not necessarily valid. Such a comparison has been performed here; one should interpret with caution.  Compared to previous bone densitometry performed on this patient, there is the suggestion of a possible trend towards worsening of the lumbar spine, and a possible trend  towards worsening of the total hip.    All pertinent notes, labs, and images personally reviewed by me.     A/P  Ms.Linda PANCHO Sutton is a 65 year old evaluated via phone visit for the management of hypothyroidism:    1. Hypothyroidism.  Currently treated with Synthroid (GIORGI) 137 mcg daily .    Past history of Graves' disease, + significantly elevated TPO antibodies consistent with Hashimoto's.    Obtain TFT's. Adjust Synthroid dose if indicated depending on lab results.    2.  Osteopenia.    Recommend daily calcium intake 1200 mg/day.    Currently eats 3-4 servings of dairy per day.  Takes no additional calcium supplements.  Restart vitamin D 2000 IU per day.    Labs ordered today:   Orders Placed This Encounter   Procedures     TSH with free T4 reflex       Follow-up:  One year, prn sooner if needed.    Yasmine Rodriguez NP  Endocrinology  [facility]  CC: Elvia Boles    Phone call duration:  14 minutes.  Call start time: 2:34 pm; call end time: 2:48 pm.   dosing weight

## 2023-05-12 ENCOUNTER — LAB (OUTPATIENT)
Dept: LAB | Facility: CLINIC | Age: 68
End: 2023-05-12
Payer: MEDICARE

## 2023-05-12 ENCOUNTER — OFFICE VISIT (OUTPATIENT)
Dept: FAMILY MEDICINE | Facility: CLINIC | Age: 68
End: 2023-05-12
Payer: MEDICARE

## 2023-05-12 ENCOUNTER — DOCUMENTATION ONLY (OUTPATIENT)
Dept: LAB | Facility: CLINIC | Age: 68
End: 2023-05-12

## 2023-05-12 VITALS
HEIGHT: 63 IN | DIASTOLIC BLOOD PRESSURE: 78 MMHG | HEART RATE: 60 BPM | OXYGEN SATURATION: 97 % | TEMPERATURE: 97.9 F | SYSTOLIC BLOOD PRESSURE: 122 MMHG | BODY MASS INDEX: 29.77 KG/M2 | WEIGHT: 168 LBS | RESPIRATION RATE: 18 BRPM

## 2023-05-12 DIAGNOSIS — Z13.6 SCREENING FOR HEART DISEASE: ICD-10-CM

## 2023-05-12 DIAGNOSIS — E78.00 ELEVATED LDL CHOLESTEROL LEVEL: ICD-10-CM

## 2023-05-12 DIAGNOSIS — E03.9 ACQUIRED HYPOTHYROIDISM: ICD-10-CM

## 2023-05-12 DIAGNOSIS — Z12.31 VISIT FOR SCREENING MAMMOGRAM: ICD-10-CM

## 2023-05-12 DIAGNOSIS — Z13.1 SCREENING FOR DIABETES MELLITUS: ICD-10-CM

## 2023-05-12 DIAGNOSIS — F33.42 MAJOR DEPRESSIVE DISORDER, RECURRENT EPISODE, IN FULL REMISSION (H): ICD-10-CM

## 2023-05-12 DIAGNOSIS — Z13.0 SCREENING FOR BLOOD DISEASE: ICD-10-CM

## 2023-05-12 DIAGNOSIS — Z00.00 ENCOUNTER FOR MEDICARE ANNUAL WELLNESS EXAM: Primary | ICD-10-CM

## 2023-05-12 DIAGNOSIS — Z12.11 SCREEN FOR COLON CANCER: ICD-10-CM

## 2023-05-12 DIAGNOSIS — E78.00 ELEVATED LDL CHOLESTEROL LEVEL: Primary | ICD-10-CM

## 2023-05-12 LAB
ALBUMIN SERPL BCG-MCNC: 4.4 G/DL (ref 3.5–5.2)
ALP SERPL-CCNC: 68 U/L (ref 35–104)
ALT SERPL W P-5'-P-CCNC: 13 U/L (ref 10–35)
ANION GAP SERPL CALCULATED.3IONS-SCNC: 11 MMOL/L (ref 7–15)
AST SERPL W P-5'-P-CCNC: 22 U/L (ref 10–35)
BILIRUB SERPL-MCNC: 0.4 MG/DL
BUN SERPL-MCNC: 14.4 MG/DL (ref 8–23)
CALCIUM SERPL-MCNC: 9.9 MG/DL (ref 8.8–10.2)
CHLORIDE SERPL-SCNC: 104 MMOL/L (ref 98–107)
CHOLEST SERPL-MCNC: 282 MG/DL
CREAT SERPL-MCNC: 0.74 MG/DL (ref 0.51–0.95)
DEPRECATED HCO3 PLAS-SCNC: 24 MMOL/L (ref 22–29)
ERYTHROCYTE [DISTWIDTH] IN BLOOD BY AUTOMATED COUNT: 13.7 % (ref 10–15)
GFR SERPL CREATININE-BSD FRML MDRD: 89 ML/MIN/1.73M2
GLUCOSE SERPL-MCNC: 107 MG/DL (ref 70–99)
HCT VFR BLD AUTO: 41.9 % (ref 35–47)
HDLC SERPL-MCNC: 106 MG/DL
HGB BLD-MCNC: 14.2 G/DL (ref 11.7–15.7)
LDLC SERPL CALC-MCNC: 154 MG/DL
MCH RBC QN AUTO: 32 PG (ref 26.5–33)
MCHC RBC AUTO-ENTMCNC: 33.9 G/DL (ref 31.5–36.5)
MCV RBC AUTO: 94 FL (ref 78–100)
NONHDLC SERPL-MCNC: 176 MG/DL
PLATELET # BLD AUTO: 267 10E3/UL (ref 150–450)
POTASSIUM SERPL-SCNC: 4.3 MMOL/L (ref 3.4–5.3)
PROT SERPL-MCNC: 7.6 G/DL (ref 6.4–8.3)
RBC # BLD AUTO: 4.44 10E6/UL (ref 3.8–5.2)
SODIUM SERPL-SCNC: 139 MMOL/L (ref 136–145)
TRIGL SERPL-MCNC: 108 MG/DL
TSH SERPL DL<=0.005 MIU/L-ACNC: 0.67 UIU/ML (ref 0.3–4.2)
WBC # BLD AUTO: 5.1 10E3/UL (ref 4–11)

## 2023-05-12 PROCEDURE — 36415 COLL VENOUS BLD VENIPUNCTURE: CPT

## 2023-05-12 PROCEDURE — 84443 ASSAY THYROID STIM HORMONE: CPT

## 2023-05-12 PROCEDURE — G0438 PPPS, INITIAL VISIT: HCPCS | Performed by: PHYSICIAN ASSISTANT

## 2023-05-12 PROCEDURE — 80053 COMPREHEN METABOLIC PANEL: CPT

## 2023-05-12 PROCEDURE — 80061 LIPID PANEL: CPT

## 2023-05-12 PROCEDURE — 85027 COMPLETE CBC AUTOMATED: CPT

## 2023-05-12 RX ORDER — LEVOTHYROXINE SODIUM 137 MCG
137 TABLET ORAL DAILY
Qty: 90 TABLET | Refills: 3 | Status: SHIPPED | OUTPATIENT
Start: 2023-05-12 | End: 2024-08-29

## 2023-05-12 SDOH — ECONOMIC STABILITY: FOOD INSECURITY: WITHIN THE PAST 12 MONTHS, THE FOOD YOU BOUGHT JUST DIDN'T LAST AND YOU DIDN'T HAVE MONEY TO GET MORE.: NEVER TRUE

## 2023-05-12 SDOH — ECONOMIC STABILITY: INCOME INSECURITY: HOW HARD IS IT FOR YOU TO PAY FOR THE VERY BASICS LIKE FOOD, HOUSING, MEDICAL CARE, AND HEATING?: NOT HARD AT ALL

## 2023-05-12 SDOH — ECONOMIC STABILITY: INCOME INSECURITY: IN THE LAST 12 MONTHS, WAS THERE A TIME WHEN YOU WERE NOT ABLE TO PAY THE MORTGAGE OR RENT ON TIME?: NO

## 2023-05-12 SDOH — HEALTH STABILITY: PHYSICAL HEALTH: ON AVERAGE, HOW MANY DAYS PER WEEK DO YOU ENGAGE IN MODERATE TO STRENUOUS EXERCISE (LIKE A BRISK WALK)?: 3 DAYS

## 2023-05-12 SDOH — ECONOMIC STABILITY: FOOD INSECURITY: WITHIN THE PAST 12 MONTHS, YOU WORRIED THAT YOUR FOOD WOULD RUN OUT BEFORE YOU GOT MONEY TO BUY MORE.: NEVER TRUE

## 2023-05-12 SDOH — HEALTH STABILITY: PHYSICAL HEALTH: ON AVERAGE, HOW MANY MINUTES DO YOU ENGAGE IN EXERCISE AT THIS LEVEL?: 0 MIN

## 2023-05-12 ASSESSMENT — PATIENT HEALTH QUESTIONNAIRE - PHQ9
SUM OF ALL RESPONSES TO PHQ QUESTIONS 1-9: 5
10. IF YOU CHECKED OFF ANY PROBLEMS, HOW DIFFICULT HAVE THESE PROBLEMS MADE IT FOR YOU TO DO YOUR WORK, TAKE CARE OF THINGS AT HOME, OR GET ALONG WITH OTHER PEOPLE: NOT DIFFICULT AT ALL
SUM OF ALL RESPONSES TO PHQ QUESTIONS 1-9: 5

## 2023-05-12 ASSESSMENT — ASTHMA QUESTIONNAIRES
QUESTION_1 LAST FOUR WEEKS HOW MUCH OF THE TIME DID YOUR ASTHMA KEEP YOU FROM GETTING AS MUCH DONE AT WORK, SCHOOL OR AT HOME: NONE OF THE TIME
QUESTION_5 LAST FOUR WEEKS HOW WOULD YOU RATE YOUR ASTHMA CONTROL: COMPLETELY CONTROLLED
ACT_TOTALSCORE: 24
ACT_TOTALSCORE: 24
QUESTION_3 LAST FOUR WEEKS HOW OFTEN DID YOUR ASTHMA SYMPTOMS (WHEEZING, COUGHING, SHORTNESS OF BREATH, CHEST TIGHTNESS OR PAIN) WAKE YOU UP AT NIGHT OR EARLIER THAN USUAL IN THE MORNING: ONCE OR TWICE
QUESTION_4 LAST FOUR WEEKS HOW OFTEN HAVE YOU USED YOUR RESCUE INHALER OR NEBULIZER MEDICATION (SUCH AS ALBUTEROL): NOT AT ALL
QUESTION_2 LAST FOUR WEEKS HOW OFTEN HAVE YOU HAD SHORTNESS OF BREATH: NOT AT ALL

## 2023-05-12 ASSESSMENT — ACTIVITIES OF DAILY LIVING (ADL): CURRENT_FUNCTION: NO ASSISTANCE NEEDED

## 2023-05-12 ASSESSMENT — SOCIAL DETERMINANTS OF HEALTH (SDOH)
HOW OFTEN DO YOU GET TOGETHER WITH FRIENDS OR RELATIVES?: TWICE A WEEK
HOW OFTEN DO YOU ATTEND CHURCH OR RELIGIOUS SERVICES?: NEVER
DO YOU BELONG TO ANY CLUBS OR ORGANIZATIONS SUCH AS CHURCH GROUPS UNIONS, FRATERNAL OR ATHLETIC GROUPS, OR SCHOOL GROUPS?: YES
IN A TYPICAL WEEK, HOW MANY TIMES DO YOU TALK ON THE PHONE WITH FAMILY, FRIENDS, OR NEIGHBORS?: MORE THAN THREE TIMES A WEEK

## 2023-05-12 ASSESSMENT — ENCOUNTER SYMPTOMS
DIZZINESS: 0
PALPITATIONS: 0
WEAKNESS: 0
SHORTNESS OF BREATH: 0
DYSURIA: 0
DIARRHEA: 0
HEMATURIA: 0
NERVOUS/ANXIOUS: 0
HEMATOCHEZIA: 0
HEARTBURN: 0
ABDOMINAL PAIN: 0
FREQUENCY: 0
ARTHRALGIAS: 1
PARESTHESIAS: 0
MYALGIAS: 1
COUGH: 1
HEADACHES: 0
CHILLS: 0
CONSTIPATION: 1
FEVER: 0
JOINT SWELLING: 0
EYE PAIN: 0
SORE THROAT: 0
BREAST MASS: 0

## 2023-05-12 ASSESSMENT — LIFESTYLE VARIABLES
HOW OFTEN DO YOU HAVE A DRINK CONTAINING ALCOHOL: 2-4 TIMES A MONTH
HOW MANY STANDARD DRINKS CONTAINING ALCOHOL DO YOU HAVE ON A TYPICAL DAY: 1 OR 2
AUDIT-C TOTAL SCORE: 2
SKIP TO QUESTIONS 9-10: 1
HOW OFTEN DO YOU HAVE SIX OR MORE DRINKS ON ONE OCCASION: NEVER

## 2023-05-12 NOTE — PROGRESS NOTES
Patient has an upcoming lab only appt and currently has no future orders in their chart. Please place future orders as needed. If this appointment is not needed please have your team contact patient to cancel.Thanks!

## 2023-05-12 NOTE — PROGRESS NOTES
"SUBJECTIVE:   Yasmine is a 67 year old who presents for Preventive Visit.      5/12/2023     3:07 PM   Additional Questions   Roomed by Aleah KWOK CMA   Accompanied by Self   Patient has been advised of split billing requirements and indicates understanding: Yes  Are you in the first 12 months of your Medicare coverage?  No    Healthy Habits:     In general, how would you rate your overall health?  Good    Frequency of exercise:  None    Do you usually eat at least 4 servings of fruit and vegetables a day, include whole grains    & fiber and avoid regularly eating high fat or \"junk\" foods?  Yes    Taking medications regularly:  Yes    Medication side effects:  None    Ability to successfully perform activities of daily living:  No assistance needed    Home Safety:  No safety concerns identified    Hearing Impairment:  Need to ask people to speak up or repeat themselves    In the past 6 months, have you been bothered by leaking of urine?  No    In general, how would you rate your overall mental or emotional health?  Good      PHQ-2 Total Score: 0    Additional concerns today:  No      Have you ever done Advance Care Planning? (For example, a Health Directive, POLST, or a discussion with a medical provider or your loved ones about your wishes): No, advance care planning information given to patient to review.  Patient plans to discuss their wishes with loved ones or provider.       Fall risk  Fallen 2 or more times in the past year?: No  Any fall with injury in the past year?: No    Cognitive Screening   1) Repeat 3 items (Leader, Season, Table)    2) Clock draw: NORMAL  3) 3 item recall: Recalls 2 objects   Results: NORMAL clock, 1-2 items recalled: COGNITIVE IMPAIRMENT LESS LIKELY    Mini-CogTM Copyright S Alejo. Licensed by the author for use in [facility]; reprinted with permission ([email or web address]@.[email or web address]). All rights reserved.        Reviewed and updated as needed this visit by clinical staff   Tobacco  " Allergies  Meds              Reviewed and updated as needed this visit by Provider                 Social History     Tobacco Use     Smoking status: Former     Packs/day: 0.50     Years: 20.00     Pack years: 10.00     Types: Cigarettes     Quit date: 1/1/2013     Years since quitting: 10.3     Smokeless tobacco: Former     Tobacco comments:     e cig for a short time   Vaping Use     Vaping status: Never Used   Substance Use Topics     Alcohol use: Yes     Alcohol/week: 5.0 standard drinks of alcohol     Types: 5 Standard drinks or equivalent per week     Comment: 10 drinks on the weekends           5/12/2023     3:04 PM   Alcohol Use   Prescreen: >3 drinks/day or >7 drinks/week? No     Do you have a current opioid prescription? No  Do you use any other controlled substances or medications that are not prescribed by a provider? Cannabis              Current providers sharing in care for this patient include:   Patient Care Team:  Aaseby-Aguilera, Ramona Ann, PA-C as PCP - General (Family Medicine)  Aaseby-Aguilera, Ramona Ann, PA-C as Assigned PCP    The following health maintenance items are reviewed in Epic and correct as of today:  Health Maintenance   Topic Date Due     ASTHMA ACTION PLAN  Never done     DEPRESSION ACTION PLAN  Never done     ZOSTER IMMUNIZATION (1 of 2) Never done     LUNG CANCER SCREENING  Never done     COVID-19 Vaccine (4 - Moderna series) 07/27/2021     Pneumococcal Vaccine: 65+ Years (1 - PCV) Never done     INFLUENZA VACCINE (1) Never done     ANNUAL REVIEW OF HM ORDERS  05/03/2023     COLORECTAL CANCER SCREENING  05/02/2023     MEDICARE ANNUAL WELLNESS VISIT  05/03/2023     TSH W/FREE T4 REFLEX  05/03/2023     ASTHMA CONTROL TEST  11/12/2023     PHQ-9  11/12/2023     FALL RISK ASSESSMENT  05/12/2024     MAMMO SCREENING  06/02/2024     LIPID  05/03/2027     ADVANCE CARE PLANNING  05/30/2027     DEXA  09/08/2030     DTAP/TDAP/TD IMMUNIZATION (2 - Td or Tdap) 05/03/2032     HEPATITIS C  SCREENING  Completed     IPV IMMUNIZATION  Aged Out     MENINGITIS IMMUNIZATION  Aged Out     PAP  Discontinued     Patient Active Problem List   Diagnosis     Hypothyroidism     Leiomyoma of uterus     Symptomatic menopausal or female climacteric states     Mild recurrent major depression (H)     ACP (advance care planning)     LEEP - JORDIN III with severe dysplasia, Also JORDIN I & II     Health Care Home     Primary localized osteoarthrosis, other specified sites     Ganglion cyst of wrist     Elevated LDL cholesterol level     CARDIOVASCULAR SCREENING; LDL GOAL LESS THAN 160     Osteopenia     Major depressive disorder, recurrent episode, in full remission (H)     Past Surgical History:   Procedure Laterality Date     ABDOMEN SURGERY      Abdominoplasty     APPENDECTOMY       CYSTOSCOPY N/A 12/9/2014    Procedure: CYSTOSCOPY;  Surgeon: Joy Monterroso DO;  Location: [facility] OR     DAVINCI HYSTERECTOMY TOTAL, BILATERAL SALPINGO-OOPHORECTOMY, COMBINED Bilateral 12/9/2014    Procedure: COMBINED DAVINCI HYSTERECTOMY TOTAL, SALPINGO-OOPHORECTOMY;  Surgeon: Joy Monterroso DO;  Location: [facility] OR     HC REMOVAL GALLBLADDER      Cholecystectomy     LEEP TX, CERVICAL  9/2013    JORDIN III     ZZHC EMBOLIZATION UTERINE FIBROID  2007       Social History     Tobacco Use     Smoking status: Former     Packs/day: 0.50     Years: 20.00     Pack years: 10.00     Types: Cigarettes     Quit date: 1/1/2013     Years since quitting: 10.3     Smokeless tobacco: Former     Tobacco comments:     e cig for a short time   Vaping Use     Vaping status: Never Used   Substance Use Topics     Alcohol use: Yes     Alcohol/week: 5.0 standard drinks of alcohol     Types: 5 Standard drinks or equivalent per week     Comment: 10 drinks on the weekends     Family History   Problem Relation Age of Onset     Heart Disease Mother         MI     Respiratory Mother         copd     Thyroid Disease Mother         hypothyroid     Gastrointestinal Disease  Mother         ulcer disease     Depression Mother      Gastrointestinal Disease Father         ulcer disease     C.A.D. Father         MI @ 69 (smoker, EtOH)     Depression Father      Thyroid Disease Sister      Thyroid Disease Sister      Diabetes Brother      Thyroid Disease Brother         hypothyroid     Thyroid Disease Paternal Aunt         hypothyroid     Breast Cancer No family hx of      Ovarian Cancer No family hx of          Recent Labs   Lab Test 05/12/23  1019 05/03/22  1438 05/20/21  1057 06/17/19  1103 03/12/19  0806   * 125* 123*  --  113*    106 103  --  103   TRIG 108 100 112  --  84   ALT 13 20 19  --  18   CR 0.74 0.73 0.72  --  0.70   GFRESTIMATED 89 >90 89  --  >90   GFRESTBLACK  --   --  >90  --  >90   POTASSIUM 4.3 4.1 4.1  --  4.4   TSH 0.67 0.52 0.75   < > 1.57    < > = values in this interval not displayed.              5/3/2022     1:40 PM 6/2/2022     9:53 AM   Breast CA Risk Assessment (FHS-7)   Do you have a family history of breast, colon, or ovarian cancer? No / Unknown No / Unknown       click delete button to remove this line now  Mammogram Screening: Recommended mammography every 1-2 years with patient discussion and risk factor consideration  Pertinent mammograms are reviewed under the imaging tab.    Review of Systems   Constitutional: Negative for chills and fever.   HENT: Positive for hearing loss. Negative for congestion, ear pain and sore throat.    Eyes: Positive for visual disturbance. Negative for pain.   Respiratory: Positive for cough. Negative for shortness of breath.    Cardiovascular: Negative for chest pain, palpitations and peripheral edema.   Gastrointestinal: Positive for constipation. Negative for abdominal pain, diarrhea, heartburn and hematochezia.   Breasts:  Negative for tenderness, breast mass and discharge.   Genitourinary: Negative for dysuria, frequency, genital sores, hematuria, pelvic pain, urgency, vaginal bleeding and vaginal  "discharge.   Musculoskeletal: Positive for arthralgias and myalgias. Negative for joint swelling.   Skin: Negative for rash.   Neurological: Negative for dizziness, weakness, headaches and paresthesias.   Psychiatric/Behavioral: Negative for mood changes. The patient is not nervous/anxious.          OBJECTIVE:   /78   Pulse 60   Temp 97.9  F (36.6  C) (Oral)   Resp 18   Ht 1.594 m (5' 2.75\")   Wt 76.2 kg (168 lb)   LMP 05/08/2008   SpO2 97%   BMI 30.00 kg/m   Estimated body mass index is 30 kg/m  as calculated from the following:    Height as of this encounter: 1.594 m (5' 2.75\").    Weight as of this encounter: 76.2 kg (168 lb).  Physical Exam  GENERAL APPEARANCE: healthy, alert and no distress  EYES: Eyes grossly normal to inspection, PERRL and conjunctivae and sclerae normal  HENT: ear canals and TM's normal, nose and mouth without ulcers or lesions, oropharynx clear and oral mucous membranes moist  NECK: no adenopathy, no asymmetry, masses, or scars and thyroid normal to palpation  RESP: lungs clear to auscultation - no rales, rhonchi or wheezes  BREAST: normal without masses, tenderness or nipple discharge and no palpable axillary masses or adenopathy  CV: regular rate and rhythm, normal S1 S2, no S3 or S4, no murmur, click or rub, no peripheral edema and peripheral pulses strong  ABDOMEN: soft, nontender, no hepatosplenomegaly, no masses and bowel sounds normal  MS: no musculoskeletal defects are noted and gait is age appropriate without ataxia  SKIN: no suspicious lesions or rashes  NEURO: Normal strength and tone, sensory exam grossly normal, mentation intact and speech normal  PSYCH: mentation appears normal and affect normal/bright    Diagnostic Test Results:  Labs reviewed in Epic    ASSESSMENT / PLAN:   (Z00.00) Encounter for Medicare annual wellness exam  (primary encounter diagnosis)  Comment:   Plan:       (Z12.11) Screen for colon cancer  Comment:   Plan: Colonoscopy Screening  " Referral            (E03.9) Acquired hypothyroidism  Comment: stable   Plan: SYNTHROID 137 MCG tablet            (Z13.6) Screening for heart disease  Comment:   Plan: CT Coronary Calcium Scan            (Z12.31) Visit for screening mammogram  Comment:   Plan: *MA Screening Digital Bilateral            (F33.42) Major depressive disorder, recurrent episode, in full remission (H)  Comment:   Plan: resolved. Yasmine has no concerns           COUNSELING:  Reviewed preventive health counseling, as reflected in patient instructions       Regular exercise       Healthy diet/nutrition       Vision screening       Hearing screening        She reports that she quit smoking about 10 years ago. Her smoking use included cigarettes. She has a 10.00 pack-year smoking history. She has quit using smokeless tobacco.      Appropriate preventive services were discussed with this patient, including applicable screening as appropriate for cardiovascular disease, diabetes, osteopenia/osteoporosis, and glaucoma.  As appropriate for age/gender, discussed screening for colorectal cancer, prostate cancer, breast cancer, and cervical cancer. Checklist reviewing preventive services available has been given to the patient.    Reviewed patients plan of care and provided an AVS. The Basic Care Plan (routine screening as documented in Health Maintenance) for Yasmine meets the Care Plan requirement. This Care Plan has been established and reviewed with the Patient.      Ramona Ann Aaseby-Aguilera, PA-C  [facility]    Identified Health Risks:

## 2023-05-12 NOTE — PATIENT INSTRUCTIONS
"  Patient Education   Personalized Prevention Plan  You are due for the preventive services outlined below.  Your care team is available to assist you in scheduling these services.  If you have already completed any of these items, please share that information with your care team to update in your medical record.  Health Maintenance Due   Topic Date Due     Asthma Action Plan - yearly  Never done     Asthma Control Test  Never done     Depression Action Plan  Never done     Zoster (Shingles) Vaccine (1 of 2) Never done     LUNG CANCER SCREENING  Never done     COVID-19 Vaccine (3 - Moderna series) 07/22/2021     FALL RISK ASSESSMENT  Never done     Pneumococcal Vaccine (1 - PCV) Never done     Flu Vaccine (1) Never done     ANNUAL REVIEW OF HM ORDERS  05/03/2023     Colorectal Cancer Screening  05/02/2023     Annual Wellness Visit  05/03/2023     Thyroid Function Lab  05/03/2023       Depression and Suicide in Older Adults  Nearly 2 million older adults in the [place] have some type of depression. Some of them even take their own lives. Yet depression among older adults is often ignored. Learning about the warning signs of depression may help spare a loved one needless pain. You may also save a life.   What is depression?  Depression is a common and serious illness. It affects the way you think and feel. It is not a normal part of aging. It is not a sign of weakness, a character flaw, or something you can \"snap out of.\" Most people with depression need treatment to get better. The most common symptom is a feeling of deep sadness. People who are depressed also may seem tired and listless. And nothing seems to give them pleasure. It s normal to grieve or be sad sometimes. But sadness lessens or passes with time. Depression rarely goes away or improves on its own. Other symptoms of depression are:     Sleeping more or less than normal    Eating more or less than normal    Having headaches, stomachaches, or other pains " that don t go away    Feeling nervous,  empty,  or worthless    Crying a lot    Thinking or talking about suicide or death    Loss of interest in activities previously enjoyed    Social isolation    Feeling confused or forgetful  What causes it?  The causes of depression aren t fully known. But it is thought to result from a complex blend of these factors:     Biochemistry. Certain chemicals in the brain play a role.    Genes. Depression does run in families.    Life stress. Life stresses can also trigger depression in some people. Older adults often face many stressors. These may include isolation, the death of friends or a spouse, health problems, and financial concerns.    Chronic health conditions. This includes diabetes, heart disease, or cancer. These can cause symptoms of depression. Medicine side effects can cause changes in thoughts and behaviors.  Giving support    Depressed people often may not want to ask for help. When they do, they may be ignored. Or they may get the wrong treatment. You can help by showing parents and older friends love and support. If they seem depressed, don t lecture the person or ignore the symptoms. Don't discount the symptoms as a  normal  part of aging. They are not. Get involved, listen, and show interest and support.   Help them understand that depression is a treatable illness. Tell them you can help them find the right treatment. Offer to go to their healthcare provider's appointment with them for support when the symptoms are discussed. With their approval, contact a local mental health center, social service agency, or hospital about services.   Helping at healthcare visits  You can be an advocate for them at healthcare appointments. Many older adults have chronic illnesses. Many of these can cause symptoms of depression. Medicine side effects can change thoughts and behaviors.   You can help make sure that the healthcare provider looks at all of these factors. They  should refer your family member or friend to a mental healthcare provider when needed. In some cases, untreated depression can lead to a misdiagnosis. A person may be diagnosed with a brain disorder such as dementia. If the healthcare provider does not take the issue of depression seriously, help your family member or friend find another provider.   Asking about self-harm thoughts  If you think an older person you care about could be suicidal, ask,  Have you thought about suicide?  Most people will tell you the truth. If they say yes, they may already have a plan for how and when they will attempt it. Find out as much as you can. The more detailed the plan, and the easier it is to carry out, the more danger the person is in right now. Tell the person you are there for them and you do not want them to get harmed. Don't wait to get help for the person. Call the person's healthcare provider, local hospital, or emergency services.   Call 988 in a crisis   Never leave the person alone. A person who is actively suicidal needs crisis care right away. They need constant supervision. Never leave the person out of sight. Call 988 Tell the crisis counselor you need help for a person who is thinking about suicide. The counselor will help you get the right level of crisis help. You may be advised to take the person to the nearest emergency room.   The National Suicide Prevention Lifeline is available at 988, or 057-703-HFPE (891-562-5869), or www.suicidepreventionlifeline.org. When you call or text 988, you will be connected to trained counselors who are part of the National Suicide Prevention Lifeline network. An online chat option is also available. Lifeline is free and available 24/7.   To learn more    National Suicide Prevention Lifeline at www.suicidepreventionlifeline.org  or 580-247-HQTH (740-631-9621)    National [place] on Mental Illness at www.isaac.org  or 163-104-PUZP (922-480-4384)    [employer] [facility] at  www.nmha.org  or 934-510-0391    National [place] of Mental Health at www.[facility].nih.gov  or 603-059-4560    Gabriele last reviewed this educational content on 7/1/2022 2000-2022 [employer]. All rights reserved. This information is not intended as a substitute for professional medical care. Always follow your healthcare professional's instructions.

## 2023-06-05 ENCOUNTER — HOSPITAL ENCOUNTER (OUTPATIENT)
Dept: MAMMOGRAPHY | Facility: CLINIC | Age: 68
Discharge: HOME | End: 2023-06-05
Attending: PHYSICIAN ASSISTANT | Admitting: PHYSICIAN ASSISTANT
Payer: MEDICARE

## 2023-06-05 DIAGNOSIS — Z12.31 VISIT FOR SCREENING MAMMOGRAM: ICD-10-CM

## 2023-06-05 PROCEDURE — 77067 SCR MAMMO BI INCL CAD: CPT

## 2023-07-21 ENCOUNTER — TELEPHONE (OUTPATIENT)
Dept: GASTROENTEROLOGY | Facility: CLINIC | Age: 68
End: 2023-07-21
Payer: MEDICARE

## 2023-07-21 ENCOUNTER — HOSPITAL ENCOUNTER (OUTPATIENT)
Facility: CLINIC | Age: 68
End: 2023-07-21
Attending: INTERNAL MEDICINE | Admitting: INTERNAL MEDICINE
Payer: MEDICARE

## 2023-07-21 DIAGNOSIS — Z12.11 SPECIAL SCREENING FOR MALIGNANT NEOPLASMS, COLON: Primary | ICD-10-CM

## 2023-07-21 NOTE — TELEPHONE ENCOUNTER
"Endoscopy Scheduling Screen    Have you had a positive Covid test in the last 14 days?  No    Are you active on MyChart?   Yes    What insurance is in the chart?  Other:  MEDICARE    Ordering/Referring Provider: Aaseby-Aguilera, Ramona Ann, PA-C      (If ordering provider performs procedure, schedule with ordering provider unless otherwise instructed. )    BMI: Estimated body mass index is 30 kg/m  as calculated from the following:    Height as of 5/12/23: 1.594 m (5' 2.75\").    Weight as of 5/12/23: 76.2 kg (168 lb).     Sedation Ordered  moderate sedation.   If patient BMI > 50 do not schedule in ASC.    Are you taking any prescription medications for pain?   No    Are you taking methadone or Suboxone?  No    Do you have a history of malignant hyperthermia or adverse reaction to anesthesia?  No    (Females) Are you currently pregnant?   No     Have you been diagnosed or told you have pulmonary hypertension?   No    Do you have an LVAD?  No    Have you been told you have moderate to severe sleep apnea?  No    Have you been told you have COPD, asthma, or any other lung disease?  No    Do you have any heart conditions?  No     Have you ever had or are you awaiting a heart or lung transplant?   No    Have you had a stroke or transient ischemic attack (TIA aka \"mini stroke\" in the last 6 months?   No    Have you been diagnosed with or been told you have cirrhosis of the liver?   No    Are you currently on dialysis?   No    Do you need assistance transferring?   No    BMI: Estimated body mass index is 30 kg/m  as calculated from the following:    Height as of 5/12/23: 1.594 m (5' 2.75\").    Weight as of 5/12/23: 76.2 kg (168 lb).     Is patients BMI > 40 and scheduling location [facility]?  No    Do you take the medication Phentermine, Ozempic or Wegovy?  No    Do you take the medication Naltrexone?  No    Do you take blood thinners?  No      Prep   Are you currently on dialysis or do you have chronic kidney disease?  No    Do " you have a diagnosis of diabetes?  No    Do you have a diagnosis of cystic fibrosis (CF)?  No    On a regular basis do you go 3 -5 days between bowel movements?  Yes (Extended Prep)    BMI > 40?  No    Preferred Pharmacy:      [id number] - [place], [place] - [address]  [address] [place] [address]  Phone: [phone] Fax: [phone]      Final Scheduling Details   Colonoscopy prep sent?  Golytely Extended Prep    Procedure scheduled  Colonoscopy    Surgeon:  JOSHUA     Date of procedure:  9/27     Schedule PAC:   No    Location  [facility]    Sedation   Moderate Sedation    Patient Reminders:    You will receive a call from a Nurse to review instructions and health history.  This assessment must be completed prior to your procedure.  Failure to complete the Nurse assessment may result in the procedure being cancelled.       On the day of your procedure, please designate an adult(s) who can drive you home stay with you for the next 24 hours. The medicines used in the exam will make you sleepy. You will not be able to drive.       You cannot take public transportation, ride share services, or non-medical taxi service without a responsible caregiver.  Medical transport services are allowed with the requirement that a responsible caregiver will receive you at your destination.  We require that drivers and caregivers are confirmed prior to your procedure.

## 2023-08-25 ENCOUNTER — TELEPHONE (OUTPATIENT)
Dept: FAMILY MEDICINE | Facility: CLINIC | Age: 68
End: 2023-08-25

## 2023-08-25 DIAGNOSIS — E03.9 ACQUIRED HYPOTHYROIDISM: Primary | ICD-10-CM

## 2023-08-25 NOTE — TELEPHONE ENCOUNTER
Order/Referral Request    Who is requesting: Patient    Orders being requested: Endocrinology order    Reason service is needed/diagnosis: Thyroid issues    When are orders needed by: ASAP    Has this been discussed with Provider: Yes    Does patient have a preference on a Group/Provider/Facility? [facility]    Does patient have an appointment scheduled?: No    Where to send orders: Place orders within Epic    Could we send this information to you in [facility] or would you prefer to receive a phone call?:   No preference   Okay to leave a detailed message?: Yes at Cell number on file:    Telephone Information:   Mobile [phone]

## 2023-09-08 RX ORDER — BISACODYL 5 MG/1
TABLET, DELAYED RELEASE ORAL
Qty: 4 TABLET | Refills: 0 | Status: SHIPPED | OUTPATIENT
Start: 2023-09-08 | End: 2023-09-20 | Stop reason: HOSPADM

## 2023-09-20 ENCOUNTER — TELEPHONE (OUTPATIENT)
Dept: GASTROENTEROLOGY | Facility: CLINIC | Age: 68
End: 2023-09-20
Payer: MEDICARE

## 2023-09-20 NOTE — TELEPHONE ENCOUNTER
Caller: Yasmine  Reason for Reschedule/Cancellation (please be detailed, any staff messages or encounters to note?): Patient- no other info given      Prior to reschedule please review:  Ordering Provider: Aaseby-Aguilera   Sedation per order: CS  Does patient have any ASC Exclusions, please identify?: N      Notes on Cancelled Procedure:  Procedure: Lower Endoscopy [Colonoscopy]   Date: 9/27/23  Location: [facility]; [address] [place] [address]  Surgeon: Verito      Rescheduled: No-patient will call back     Send In - basket message to Panc - Marciano Pool if EUS  procedure is canceled or rescheduled: [ N/A, YES or NO] NA

## 2023-12-19 ENCOUNTER — MYC MEDICAL ADVICE (OUTPATIENT)
Dept: FAMILY MEDICINE | Facility: CLINIC | Age: 68
End: 2023-12-19
Payer: MEDICARE

## 2023-12-19 NOTE — TELEPHONE ENCOUNTER
Patient Quality Outreach    Patient is due for the following:   Colon Cancer Screening    Next Steps:   No follow up needed at this time.    Type of outreach:    Sent [employer] message.    Next Steps:  Reach out within 90 days via [employer].    Max number of attempts reached: No. Will try again in 90 days if patient still on fail list.    Questions for provider review:    None           Lary Bray MA  Chart routed to Care Team.

## 2024-04-12 ENCOUNTER — PATIENT OUTREACH (OUTPATIENT)
Dept: CARE COORDINATION | Facility: CLINIC | Age: 69
End: 2024-04-12
Payer: MEDICARE

## 2024-04-26 ENCOUNTER — PATIENT OUTREACH (OUTPATIENT)
Dept: CARE COORDINATION | Facility: CLINIC | Age: 69
End: 2024-04-26
Payer: MEDICARE

## 2024-04-29 ENCOUNTER — OFFICE VISIT (OUTPATIENT)
Dept: ENDOCRINOLOGY | Facility: CLINIC | Age: 69
End: 2024-04-29
Attending: PHYSICIAN ASSISTANT
Payer: MEDICARE

## 2024-04-29 VITALS
HEIGHT: 63 IN | OXYGEN SATURATION: 97 % | WEIGHT: 169.4 LBS | SYSTOLIC BLOOD PRESSURE: 120 MMHG | DIASTOLIC BLOOD PRESSURE: 76 MMHG | HEART RATE: 60 BPM | TEMPERATURE: 97.4 F | RESPIRATION RATE: 18 BRPM | BODY MASS INDEX: 30.02 KG/M2

## 2024-04-29 DIAGNOSIS — M85.89 OTHER SPECIFIED DISORDERS OF BONE DENSITY AND STRUCTURE, MULTIPLE SITES: ICD-10-CM

## 2024-04-29 DIAGNOSIS — N95.1 SYMPTOMATIC MENOPAUSAL OR FEMALE CLIMACTERIC STATES: ICD-10-CM

## 2024-04-29 DIAGNOSIS — E03.9 ACQUIRED HYPOTHYROIDISM: ICD-10-CM

## 2024-04-29 DIAGNOSIS — E55.9 VITAMIN D DEFICIENCY: Primary | ICD-10-CM

## 2024-04-29 DIAGNOSIS — M85.80 OSTEOPENIA, UNSPECIFIED LOCATION: ICD-10-CM

## 2024-04-29 LAB
ANION GAP SERPL CALCULATED.3IONS-SCNC: 12 MMOL/L (ref 7–15)
BUN SERPL-MCNC: 14.2 MG/DL (ref 8–23)
CALCIUM SERPL-MCNC: 9.9 MG/DL (ref 8.8–10.2)
CHLORIDE SERPL-SCNC: 102 MMOL/L (ref 98–107)
CHOLEST SERPL-MCNC: 271 MG/DL
CREAT SERPL-MCNC: 0.72 MG/DL (ref 0.51–0.95)
DEPRECATED HCO3 PLAS-SCNC: 24 MMOL/L (ref 22–29)
EGFRCR SERPLBLD CKD-EPI 2021: >90 ML/MIN/1.73M2
FASTING STATUS PATIENT QL REPORTED: YES
GLUCOSE SERPL-MCNC: 111 MG/DL (ref 70–99)
HDLC SERPL-MCNC: 103 MG/DL
LDLC SERPL CALC-MCNC: 142 MG/DL
NONHDLC SERPL-MCNC: 168 MG/DL
POTASSIUM SERPL-SCNC: 4.6 MMOL/L (ref 3.4–5.3)
SODIUM SERPL-SCNC: 138 MMOL/L (ref 135–145)
T4 FREE SERPL-MCNC: 1.77 NG/DL (ref 0.9–1.7)
TRIGL SERPL-MCNC: 128 MG/DL
TSH SERPL DL<=0.005 MIU/L-ACNC: 1.11 UIU/ML (ref 0.3–4.2)
VIT D+METAB SERPL-MCNC: 39 NG/ML (ref 20–50)

## 2024-04-29 PROCEDURE — 82306 VITAMIN D 25 HYDROXY: CPT | Performed by: PHYSICIAN ASSISTANT

## 2024-04-29 PROCEDURE — 99203 OFFICE O/P NEW LOW 30 MIN: CPT | Performed by: PHYSICIAN ASSISTANT

## 2024-04-29 PROCEDURE — 80048 BASIC METABOLIC PNL TOTAL CA: CPT | Performed by: PHYSICIAN ASSISTANT

## 2024-04-29 PROCEDURE — 80061 LIPID PANEL: CPT | Performed by: PHYSICIAN ASSISTANT

## 2024-04-29 PROCEDURE — 84443 ASSAY THYROID STIM HORMONE: CPT | Performed by: PHYSICIAN ASSISTANT

## 2024-04-29 PROCEDURE — 84439 ASSAY OF FREE THYROXINE: CPT | Performed by: PHYSICIAN ASSISTANT

## 2024-04-29 PROCEDURE — 36415 COLL VENOUS BLD VENIPUNCTURE: CPT | Performed by: PHYSICIAN ASSISTANT

## 2024-04-29 NOTE — PROGRESS NOTES
Assessment/Plan :   Hypothyroidism. We discussed the role of T4 and T3 in the management of hypothyroidism. We also reviewed her recent laboratory results. If anything, her TSH indicates that she may be slightly over-supplemented. We do need to repeat thyroid testing today and I will add a FT4. We discussed possibly decreasing the levothyroxine and adding liothyronine. I will contact her with the results and any medication recommendations. We will follow-up in 3-6 mos.  Osteopenia. Yasmine is long overdue for a repeat DEXA scan. We discussed that thyroid disease can increase the risk of developing osteopenia/osteoporosis. We also reviewed the significance of daily calcium and vitamin D supplementation. I will place an order for a repeat scan and we will also check a vitamin D level. We will follow-up in 3-6 mos.      I have independently reviewed and interpreted labs, imaging as indicated.      Chief complaint:  Yasmine is a 68 year old female seen in consultation at the request of Dr. Aaseby-Aguilera for hypothyroidism.    I have reviewed Care Everywhere including [facility], [facility], [facility] , [facility] lab reports, imaging reports and provider notes as indicated.      HISTORY OF PRESENT ILLNESS  Yasmine was diagnosed with thyroid disease over 30 yrs ago. She was originally diagnosed with Graves disease and she took methimazole for some time. Eventually, the hyperthyroidism went into remission and she flipped to hypothyroidism. She has taken levothyroxine and liothyronine in the past and she feels like the addition of T3 really helps with her overall symptoms.     At present time, she is taking 137 mcg of levothyroxine daily. She has been on this dose for about 5 yrs. She feels like something is off. She is tired and sluggish and her skin and eyes are really dry. She just doesn't feel good. She mentioned this to her primary care provider but Yasmine was told  that her thyroid levels are stable.    Yasmine also has a history of osteopenia. She does not currently take vitamin D or calcium. She was told that her vitamin D levels were stable and that she did not need to continue with the supplement. Her last DEXA scan was in .     Endocrine relevant labs are as follows:   Latest Reference Range & Units 23 10:19   TSH 0.30 - 4.20 uIU/mL 0.67      Latest Reference Range & Units 22 14:38   TSH 0.40 - 4.00 mU/L 0.52     Relevant imaging is as follows: (as read by me as it pertains to endocrine relevant organs)  Narrative & Impression   BONE DENSITOMETRY  [place] [id number]  2015      PATIENT: Yasmine Sutton  CHART: [id number]   : 1955  AGE: 59 year old  SEX: female   REFERRING PROVIDER: Yasmine Rodriguez [facility]     PROCEDURE: Bone density scanning was performed using DXA technology of the lumbar spine and hip. Scanning was performed on a [employer] scanner. Reporting is completed in the form of a T-score. The T-score represents the standard deviation from peak bone mass based on a young healthy adult.     REFERENCE T-SCORES:   Normal -1.0 and greater   Osteopenia Between -1.0 and -2.5   Osteoporosis -2.5 and less      RISK FACTORS: Post-menopausal     CURRENT TREATMENT: none listed     FINDINGS:  Lumbar Spine (L1-L4) T-score: 1.4  Left Femoral Neck T-score: -0.4  Right Femoral Neck T-score: -1.6     Lumbar (L1-L4) BMD: 1.373 Previous: 1.530   Total Hip Mean BMD: 0.961 Previous: 1.075     Comparison is made to another DXA performed on a different "[employer] machine on 2006.         IMPRESSION  Osteopenia (low bone mass)  Degenerative changes of the spine  Recommendations include ensuring adequate daily Calcium and Vitamin D intake  Follow up scan can be considered in three to five years.     Comparisons from different scanners that have not been cross calibrated, are not  necessarily valid. Such a comparison has been performed here; one should interpret with caution.  Compared to previous bone densitometry performed on this patient, there is the suggestion of a possible trend towards worsening of the lumbar spine, and a possible trend towards worsening of the total hip.     Current NOF guidelines recommend treatment for patients with the following:  - Prior hip or vertebral fracture  - T-score -2.5 or below  - A 10 year risk of any major osteoporotic fracture >20% or 10 year risk of hip fracture >3%, as calculated using the FRAX calculator (www.shef.ac.uk/FRAX).       This patient's risks with the use of FRAX (based on available information) are 7.8 % for major osteoporotic fracture and 0.7 % for hip fracture.   Based on these guidelines, treatment (in addition to calcium and vitamin D) is not recommended for this patient.  While this is meant as an aid to clinical decision-making, clinical judgment must still be used.           REVIEW OF SYSTEMS    Endocrine: positive for thyroid disorder  Skin: negative  Eyes: positive for dry eyes, negative for, visual blurring, redness, tearing  Ears/Nose/Throat: negative for, hoarseness, feeling of fullness  Respiratory: No shortness of breath, dyspnea on exertion, cough, or hemoptysis  Cardiovascular: negative for, irregular heart beat, chest pain, dyspnea on exertion, lower extremity edema, and exercise intolerance  Gastrointestinal: negative for, nausea, vomiting, constipation, and diarrhea  Genitourinary: negative for, nocturia, dysuria, frequency, and urgency  Musculoskeletal: negative for, muscular weakness, nocturnal cramping, and foot pain  Neurologic: negative for, local weakness, numbness or tingling of hands, and numbness or tingling of feet  Psychiatric: negative  Hematologic/Lymphatic/Immunologic: negative    Past Medical History  Past Medical History:   Diagnosis Date    Abnormal Pap smear, can't excl hi gd sq intraepithelial  "lesion (ASC-H) 2014    + HPV 31    Cervical high risk HPV (human papillomavirus) test positive 2011, 2012    + HPV 31, NIL pap, '13colp:JORDIN I & II & III    Depression, major     Major depressive disorder, recurrent episode, in full remission (H24) 10/20/2017    PONV (postoperative nausea and vomiting)     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 in her father; Depression in her father and mother; Diabetes in her brother; Gastrointestinal Disease in her father and mother; Heart Disease in her mother; Respiratory in her mother; Thyroid Disease in her brother, mother, paternal aunt, sister, and sister.    Social History  Social History     Tobacco Use    Smoking status: Former     Current packs/day: 0.00     Average packs/day: 0.5 packs/day for 20.0 years (10.0 ttl pk-yrs)     Types: Cigarettes     Start date: 1993     Quit date: 2013     Years since quittin.3    Smokeless tobacco: Former    Tobacco comments:     e cig for a short time   Vaping Use    Vaping status: Never Used   Substance Use Topics    Alcohol use: Yes     Alcohol/week: 5.0 standard drinks of alcohol     Types: 5 Standard drinks or equivalent per week     Comment: 10 drinks on the weekends    Drug use: No       Physical Exam  /76 (BP Location: Left arm, Patient Position: Chair, Cuff Size: Adult Regular)   Pulse 60   Temp 97.4  F (36.3  C) (Tympanic)   Resp 18   Ht 1.594 m (5' 2.76\")   Wt 76.8 kg (169 lb 6.4 oz)   LMP 2008   SpO2 97%   Breastfeeding No   BMI 30.24 kg/m    Body mass index is 30.24 kg/m .  GENERAL :  In no " apparent distress  SKIN: Normal color, normal temperature, texture.  No hirsutism, alopecia or purple striae.     EYES: PERRL, EOMI, No scleral icterus,  No proptosis, conjunctival redness, stare, retraction  NECK: No visible masses. No palpable adenopathy, or masses. No carotid bruits.   THYROID:  Normal, nontender, smooth / firm texture,  no nodules, no Bruit   RESP: Lungs clear to auscultation bilaterally  CARDIAC: Regular rate and rhythm, normal S1 S2, without murmurs, rubs or gallops        NEURO: awake, alert, responds appropriately to questions.  Cranial nerves intact.   Moves all extremities; Gait normal.  No tremor of the outstretched hand.    EXTREMITIES: No clubbing, cyanosis or edema.

## 2024-04-29 NOTE — LETTER
4/29/2024         RE: Yasmine Sutton  [address] [facility] [address]        Dear Colleague,    Thank you for referring your patient, Yasmine Sutton, to the [facility]. Please see a copy of my visit note below.    Assessment/Plan :   Hypothyroidism. We discussed the role of T4 and T3 in the management of hypothyroidism. We also reviewed her recent laboratory results. If anything, her TSH indicates that she may be slightly over-supplemented. We do need to repeat thyroid testing today and I will add a FT4. We discussed possibly decreasing the levothyroxine and adding liothyronine. I will contact her with the results and any medication recommendations. We will follow-up in 3-6 mos.  Osteopenia. Yasmine is long overdue for a repeat DEXA scan. We discussed that thyroid disease can increase the risk of developing osteopenia/osteoporosis. We also reviewed the significance of daily calcium and vitamin D supplementation. I will place an order for a repeat scan and we will also check a vitamin D level. We will follow-up in 3-6 mos.      I have independently reviewed and interpreted labs, imaging as indicated.      Chief complaint:  Yasmine is a 68 year old female seen in consultation at the request of Dr. Aaseby-Aguilera for hypothyroidism.    I have reviewed Care Everywhere including [facility], [facility] , [facility], [place] lab reports, imaging reports and provider notes as indicated.      HISTORY OF PRESENT ILLNESS  Yasmine was diagnosed with thyroid disease over 30 yrs ago. She was originally diagnosed with Graves disease and she took methimazole for some time. Eventually, the hyperthyroidism went into remission and she flipped to hypothyroidism. She has taken levothyroxine and liothyronine in the past and she feels like the addition of T3 really helps with her overall symptoms.     At present time, she is taking 137 mcg of  levothyroxine daily. She has been on this dose for about 5 yrs. She feels like something is off. She is tired and sluggish and her skin and eyes are really dry. She just doesn't feel good. She mentioned this to her primary care provider but Yasmine was told that her thyroid levels are stable.    Yasmine also has a history of osteopenia. She does not currently take vitamin D or calcium. She was told that her vitamin D levels were stable and that she did not need to continue with the supplement. Her last DEXA scan was in .     Endocrine relevant labs are as follows:   Latest Reference Range & Units 23 10:19   TSH 0.30 - 4.20 uIU/mL 0.67      Latest Reference Range & Units 22 14:38   TSH 0.40 - 4.00 mU/L 0.52     Relevant imaging is as follows: (as read by me as it pertains to endocrine relevant organs)  Narrative & Impression   BONE DENSITOMETRY  [address] [address]  2015      PATIENT: Yasmine Sutton  CHART: [id number]   : 1955  AGE: 59 year old  SEX: female   REFERRING PROVIDER: Yasmine Rodriguez [facility]     PROCEDURE: Bone density scanning was performed using DXA technology of the lumbar spine and hip. Scanning was performed on a [employer] scanner. Reporting is completed in the form of a T-score. The T-score represents the standard deviation from peak bone mass based on a young healthy adult.     REFERENCE T-SCORES:   Normal -1.0 and greater   Osteopenia Between -1.0 and -2.5   Osteoporosis -2.5 and less      RISK FACTORS: Post-menopausal     CURRENT TREATMENT: none listed     FINDINGS:  Lumbar Spine (L1-L4) T-score: 1.4  Left Femoral Neck T-score: -0.4  Right Femoral Neck T-score: -1.6     Lumbar (L1-L4) BMD: 1.373 Previous: 1.530   Total Hip Mean BMD: 0.961 Previous: 1.075     Comparison is made to another DXA performed on a different [employer] machine on 2006.         IMPRESSION  Osteopenia (low bone  mass)  Degenerative changes of the spine  Recommendations include ensuring adequate daily Calcium and Vitamin D intake  Follow up scan can be considered in three to five years.     Comparisons from different scanners that have not been cross calibrated, are not necessarily valid. Such a comparison has been performed here; one should interpret with caution.  Compared to previous bone densitometry performed on this patient, there is the suggestion of a possible trend towards worsening of the lumbar spine, and a possible trend towards worsening of the total hip.     Current NOF guidelines recommend treatment for patients with the following:  - Prior hip or vertebral fracture  - T-score -2.5 or below  - A 10 year risk of any major osteoporotic fracture >20% or 10 year risk of hip fracture >3%, as calculated using the FRAX calculator (www.shef.ac.uk/FRAX).       This patient's risks with the use of FRAX (based on available information) are 7.8 % for major osteoporotic fracture and 0.7 % for hip fracture.   Based on these guidelines, treatment (in addition to calcium and vitamin D) is not recommended for this patient.  While this is meant as an aid to clinical decision-making, clinical judgment must still be used.           REVIEW OF SYSTEMS    Endocrine: positive for thyroid disorder  Skin: negative  Eyes: positive for dry eyes, negative for, visual blurring, redness, tearing  Ears/Nose/Throat: negative for, hoarseness, feeling of fullness  Respiratory: No shortness of breath, dyspnea on exertion, cough, or hemoptysis  Cardiovascular: negative for, irregular heart beat, chest pain, dyspnea on exertion, lower extremity edema, and exercise intolerance  Gastrointestinal: negative for, nausea, vomiting, constipation, and diarrhea  Genitourinary: negative for, nocturia, dysuria, frequency, and urgency  Musculoskeletal: negative for, muscular weakness, nocturnal cramping, and foot pain  Neurologic: negative for, local  weakness, numbness or tingling of hands, and numbness or tingling of feet  Psychiatric: negative  Hematologic/Lymphatic/Immunologic: negative    Past Medical History  Past Medical History:   Diagnosis Date     Abnormal Pap smear, can't excl hi gd sq intraepithelial lesion (ASC-H) 2014    + HPV 31     Cervical high risk HPV (human papillomavirus) test positive 2011, 2012    + HPV 31, NIL pap, '13colp:JORDIN I & II & III     Depression, major      Major depressive disorder, recurrent episode, in full remission (H24) 10/20/2017     PONV (postoperative nausea and vomiting)      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 in her father; Depression in her father and mother; Diabetes in her brother; Gastrointestinal Disease in her father and mother; Heart Disease in her mother; Respiratory in her mother; Thyroid Disease in her brother, mother, paternal aunt, sister, and sister.    Social History  Social History     Tobacco Use     Smoking status: Former     Current packs/day: 0.00     Average packs/day: 0.5 packs/day for 20.0 years (10.0 ttl pk-yrs)     Types: Cigarettes     Start date: 1993     Quit date: 2013     Years since quittin.3     Smokeless tobacco: Former     Tobacco comments:     e cig for a short time   Vaping Use     Vaping status: Never Used   Substance Use Topics     Alcohol use: Yes     Alcohol/week: 5.0 standard drinks of alcohol     Types: 5 Standard drinks or equivalent per week     Comment: 10 drinks on the weekends     Drug use: No       Physical Exam  /76 (BP Location: Left arm, Patient  "Position: Chair, Cuff Size: Adult Regular)   Pulse 60   Temp 97.4  F (36.3  C) (Tympanic)   Resp 18   Ht 1.594 m (5' 2.76\")   Wt 76.8 kg (169 lb 6.4 oz)   LMP 05/08/2008   SpO2 97%   Breastfeeding No   BMI 30.24 kg/m    Body mass index is 30.24 kg/m .  GENERAL :  In no apparent distress  SKIN: Normal color, normal temperature, texture.  No hirsutism, alopecia or purple striae.     EYES: PERRL, EOMI, No scleral icterus,  No proptosis, conjunctival redness, stare, retraction  NECK: No visible masses. No palpable adenopathy, or masses. No carotid bruits.   THYROID:  Normal, nontender, smooth / firm texture,  no nodules, no Bruit   RESP: Lungs clear to auscultation bilaterally  CARDIAC: Regular rate and rhythm, normal S1 S2, without murmurs, rubs or gallops        NEURO: awake, alert, responds appropriately to questions.  Cranial nerves intact.   Moves all extremities; Gait normal.  No tremor of the outstretched hand.    EXTREMITIES: No clubbing, cyanosis or edema.              Again, thank you for allowing me to participate in the care of your patient.        Sincerely,        Katelyn Meza PA-C  "

## 2024-04-29 NOTE — PATIENT INSTRUCTIONS
[facility]  Dr Cunningham, Endocrinology Department    [address] [id number]  [place] [address]  Appointment [phone]  Fax: [phone]  [place]: Monday - Thursday

## 2024-05-02 ENCOUNTER — NURSE TRIAGE (OUTPATIENT)
Dept: FAMILY MEDICINE | Facility: CLINIC | Age: 69
End: 2024-05-02

## 2024-05-02 ENCOUNTER — OFFICE VISIT (OUTPATIENT)
Dept: INTERNAL MEDICINE | Facility: CLINIC | Age: 69
End: 2024-05-02
Payer: MEDICARE

## 2024-05-02 ENCOUNTER — MYC MEDICAL ADVICE (OUTPATIENT)
Dept: FAMILY MEDICINE | Facility: CLINIC | Age: 69
End: 2024-05-02

## 2024-05-02 VITALS
OXYGEN SATURATION: 97 % | WEIGHT: 167.7 LBS | TEMPERATURE: 96.4 F | SYSTOLIC BLOOD PRESSURE: 110 MMHG | RESPIRATION RATE: 17 BRPM | HEART RATE: 83 BPM | DIASTOLIC BLOOD PRESSURE: 82 MMHG | HEIGHT: 63 IN | BODY MASS INDEX: 29.71 KG/M2

## 2024-05-02 DIAGNOSIS — R42 LIGHT HEADED: ICD-10-CM

## 2024-05-02 DIAGNOSIS — R42 DIZZINESS: Primary | ICD-10-CM

## 2024-05-02 PROCEDURE — 99214 OFFICE O/P EST MOD 30 MIN: CPT

## 2024-05-02 NOTE — TELEPHONE ENCOUNTER
Patient Quality Outreach    Patient is due for the following:   Colon Cancer Screening    Next Steps:   No follow up needed at this time.    Type of outreach:    Sent [employer] message.    Next Steps:  Reach out within 90 days via [employer].    Max number of attempts reached: No. Will try again in 90 days if patient still on fail list.    Questions for provider review:    None           Krystyna Kelly, GHADA  Chart routed to Care Team.

## 2024-05-02 NOTE — TELEPHONE ENCOUNTER
Writer consulted with LILIANA Nguyen, who would like patient to be seen in person or Urgent Care. No openings in [facility] [place] [facility]. There are openings in [facility] clinic today. Will call patient to see if she wants to be seen in [place] vs Urgent Care.  Patient is in agreement, will see provider in [place].  Appointments in Next Year      May 02, 2024  1:00 PM  (Arrive by 12:40 PM)  Provider Visit with WYATT Lang CNP  [facility] ([facility] [place] ) [phone]

## 2024-05-02 NOTE — TELEPHONE ENCOUNTER
"2nd level triage- routing high priority to pcp and LV triage     Situation   Priority call for dizziness,  states she almost blacked out yesterday.     Background   Does not take blood pressure medication.   no hx of htn, diabetes, stroke, pe/dvt  Had a respiratory infection and took steroids last month , completed 4/16 all symptoms had resolved.     Assessment - see triage     Mild lightheaded feeling all day yesterday sitting and standing, felt she was going to faint briefly yesterday am.     Today feeling moderately lightheaded sitting and standing    Patient states she has been experiencing  SOB that is mild when walking, patient was unable to answer when the SOB started- days, weeks or months. \" I get out of breath easy when I walk, it's not terrible through, I don't know\"    I had heart palpitations about 3-4 days ago for a couple seconds.   Patient does not have a blood pressure cuff. Pulse is 90     Patient states she has been eating and drinking well    Patient states she has been stressed out lately.     Denied any other symptoms    Recommendations   Explained will send this information over the pcp for review and recommendations.   ADS versus UC/office visit   Explained to patient if she does not hear anything back in an hour go to  UC - have someone drive.   Red flag symptoms reviewed- ed/911     Reason for Disposition   [1] MODERATE dizziness (e.g., interferes with normal activities) AND [2] has NOT been evaluated by doctor (or NP/PA) for this  (Exception: Dizziness caused by heat exposure, sudden standing, or poor fluid intake.)    Additional Information   Negative: SEVERE difficulty breathing (e.g., struggling for each breath, speaks in single words)   Negative: [1] Difficulty breathing or swallowing AND [2] started suddenly after medicine, an allergic food or bee sting   Negative: Shock suspected (e.g., cold/pale/clammy skin, too weak to stand, low BP, rapid pulse)   Negative: Difficult to " "awaken or acting confused (e.g., disoriented, slurred speech)   Negative: [1] Weakness (i.e., paralysis, loss of muscle strength) of the face, arm or leg on one side of the body AND [2] sudden onset AND [3] present now   Negative: [1] Numbness (i.e., loss of sensation) of the face, arm or leg on one side of the body AND [2] sudden onset AND [3] present now   Negative: [1] Loss of speech or garbled speech AND [2] sudden onset AND [3] present now   Negative: Overdose (accidental or intentional) of medications   Negative: [1] Fainted > 15 minutes ago AND [2] still feels too weak or dizzy to stand   Negative: Sounds like a life-threatening emergency to the triager   Negative: Chest pain   Negative: Rectal bleeding, bloody stool, or tarry-black stool   Negative: [1] Vomiting AND [2] contains red blood or black (\"coffee ground\") material   Negative: Vomiting is main symptom   Negative: Diarrhea is main symptom   Negative: Headache is main symptom   Negative: Patient states that they are having an anxiety or panic attack   Negative: Dizziness from low blood sugar (i.e., < 60 mg/dl or 3.5 mmol/l)   Negative: Dizziness is described as a spinning sensation (i.e., vertigo)   Negative: Heat exhaustion suspected (i.e., dehydration from heat exposure)   Negative: Difficulty breathing   Negative: SEVERE dizziness (e.g., unable to stand, requires support to walk, feels like passing out now)   Negative: [1] Drinking very little AND [2] dehydration suspected (e.g., no urine > 12 hours, very dry mouth, very lightheaded)   Negative: [1] Weakness (i.e., paralysis, loss of muscle strength) of the face, arm / hand, or leg / foot on one side of the body AND [2] sudden onset AND [3] brief (now gone)   Negative: [1] Numbness (i.e., loss of sensation) of the face, arm / hand, or leg / foot on one side of the body AND [2] sudden onset AND [3] brief (now gone)   Negative: [1] Loss of speech or garbled speech AND [2] sudden onset AND [3] brief " "(now gone)   Negative: Loss of vision or double vision  (Exception: Similar to previous migraines.)   Negative: Patient sounds very sick or weak to the triager   Negative: [1] Dizziness caused by heat exposure, sudden standing, or poor fluid intake AND [2] no improvement after 2 hours of rest and fluids   Negative: [1] Fever > 103 F (39.4 C) AND [2] not able to get the fever down using Fever Care Advice   Negative: [1] Fever > 101 F (38.3 C) AND [2] age > 60 years   Negative: [1] Fever > 100.0 F (37.8 C) AND [2] bedridden (e.g., CVA, chronic illness, recovering from surgery)   Negative: [1] Fever > 100.0 F (37.8 C) AND [2] diabetes mellitus or weak immune system (e.g., HIV positive, cancer chemo, splenectomy, organ transplant, chronic steroids)   Negative: Heart beating < 50 beats per minute OR > 140 beats per minute     Patient pulse is 90   Commented on: Extra heartbeats, irregular heart beating, or heart is beating very fast  (i.e., \"palpitations\")     I had palpitations about 3-4 days ago for a couple seconds.    Answer Assessment - Initial Assessment Questions  1        2. LIGHTHEADED: \"Do you feel lightheaded?\" (e.g., somewhat faint, woozy, weak upon standing)      Dizziness started yesterday- I got up after drinking my coffee and almost backed out. Had to grab onto the counter and sat down and faint like feeling resolved    lightheaded all day yesterday - able to tolerated all activities     Today feels lightheaded standing and sitting .   Has not had anything like this before.     3. VERTIGO: \"Do you feel like either you or the room is spinning or tilting?\" (i.e. vertigo)      No   4. SEVERITY: \"How bad is it?\"  \"Do you feel like you are going to faint?\" \"Can you stand and walk?\"    - MILD: yesterday through out the day  Today moderate .       5. ONSET:  \"When did the dizziness begin?\"      Yesterday   6. AGGRAVATING FACTORS: \"Does anything make it worse?\" (e.g., standing, change in head position)      Same " "sitting and standing  7. HEART RATE: \"Can you tell me your heart rate?\" \"How many beats in 15 seconds?\"  (Note: not all patients can do this)          8. CAUSE: \"What do you think is causing the dizziness?\"      Na   9. RECURRENT SYMPTOM: \"Have you had dizziness before?\" If Yes, ask: \"When was the last time?\" \"What happened that time?\"      Has never experienced anything like this before.   10. OTHER SYMPTOMS: \"Do you have any other symptoms?\" (e.g., fever, chest pain, vomiting, diarrhea, bleeding)        Denied all.   11. PREGNANCY: \"Is there any chance you are pregnant?\" \"When was your last menstrual period?\"        Na    Protocols used: Dizziness - [id number]        "

## 2024-05-02 NOTE — PROGRESS NOTES
"  Assessment & Plan     (R42) Dizziness  (primary encounter diagnosis)  Comment: Patient presents to the clinic for dizziness and lightheadedness for the last 3-4 days. Patient states she is undergoing quite a bit of stress. She doesn't have any known illnesses. Blood pressure is normal. Clinical picture suggests possible increased stress, with differential of orthostatic hypotension, dehydration or low blood sugars. Will get labs to ensure she is not anemic. I do not feel this is cardiac related as she is normal blood pressure and she is in a regular rhythm. Patient is not interested in antianxiety medication at this time, but would be interested in trying a supplement. Advised trying  Ashwagandha.   Plan: CBC with platelets and differential, CANCELED:         EKG 12-lead complete w/read - Clinics,         CANCELED: Basic metabolic panel  (Ca, Cl, CO2,         Creat, Gluc, K, Na, BUN)        Labs pending. EKG cancelled as patient did not want to get one today.     (R42) Light headed  Comment: please see above.   Plan: CBC with platelets and differential, CANCELED:         EKG 12-lead complete w/read - Clinics,         CANCELED: Basic metabolic panel  (Ca, Cl, CO2,         Creat, Gluc, K, Na, BUN)        Labs pending.     RTC if symptoms do not improve or worsen.       30 minutes spent by me on the date of the encounter doing chart review, patient visit, and documentation       BMI  Estimated body mass index is 30.18 kg/m  as calculated from the following:    Height as of this encounter: 1.588 m (5' 2.5\").    Weight as of this encounter: 76.1 kg (167 lb 11.2 oz).   Weight management plan: Patient was referred to their PCP to discuss a diet and exercise plan.          Angelica Underwood is a 68 year old, presenting for the following health issues: Mild lightheaded feeling all day yesterday sitting and standing, felt she was going to faint briefly yesterday am.   Today feeling moderately lightheaded sitting and " "standing  Patient states she has been experiencing  SOB that is mild when walking, patient was unable to answer when the SOB started- days, weeks or months. \" I get out of breath easy when I walk, it's not terrible through, I don't know\"  I had heart palpitations about 3-4 days ago for a couple seconds.   Patient does not have a blood pressure cuff. Pulse is 90   Patient states she has been eating and drinking well  Patient states she has been stressed out lately.   Denied any other symptoms    Does not take blood pressure medication.   no hx of htn, diabetes, stroke, pe/dvt  Had a respiratory infection and took steroids last month , completed 4/16 all symptoms had resolved.     Started yesterday morning. She just started taking new vitamins. So she didn't take them today. She stood up to get coffee and she felt like she might pass out. She felt it throughout the day while sitting.   At breakfast this AM she still felt it. When she is sitting here today she can still feel it.   Heart palpitations: no  Stress: lots of stress that just started 2 weeks ago or 3 weeks ago when she got home from [place]. Her  is having impenitent problems, he is diabetic and she doesn't want to do it anymore.   She wants to separate him, they are texting right now.   Sleep has been good.   Meals: only eats breakfast and dinner. Eating the same even with the stress    Consult (Lightheadedness x's 1 day. No other symptoms)    HPI               Review of Systems  Constitutional, HEENT, cardiovascular, pulmonary, gi and gu systems are negative, except as otherwise noted.      Objective    /82   Pulse 83   Temp (!) 96.4  F (35.8  C) (Tympanic)   Resp 17   Ht 1.588 m (5' 2.5\")   Wt 76.1 kg (167 lb 11.2 oz)   LMP 05/08/2008   SpO2 97%   BMI 30.18 kg/m    Body mass index is 30.18 kg/m .  Physical Exam   GENERAL: alert and no distress  NECK: no adenopathy, no asymmetry, masses, or scars  RESP: lungs clear to auscultation - no " rales, rhonchi or wheezes  CV: regular rate and rhythm, normal S1 S2, no S3 or S4, no murmur, click or rub, no peripheral edema  ABDOMEN: soft, nontender, no hepatosplenomegaly, no masses and bowel sounds normal  MS: no gross musculoskeletal defects noted, no edema            Signed Electronically by: WYATT Lang CNP

## 2024-05-14 ENCOUNTER — TELEPHONE (OUTPATIENT)
Dept: ENDOCRINOLOGY | Facility: CLINIC | Age: 69
End: 2024-05-14
Payer: MEDICARE

## 2024-05-14 NOTE — TELEPHONE ENCOUNTER
Writer called and informed the patient of Katelyn's recommendations. Patient agrees to reduce her dose and skip her levothyroxine on Sunday's and Wednesday's/    Patient questions when she should have the labs rechecked. Will forward the message back to Katelyn to place orders and advise.    Yuli Olsen CMA  Adult Endocrinology  [facility] [place]

## 2024-05-14 NOTE — TELEPHONE ENCOUNTER
----- Message from Susie Green RN sent at 5/13/2024 10:39 AM CDT -----    ----- Message -----  From: Katelyn Meza PA-C  Sent: 5/3/2024   9:03 AM CDT  To: [facility] Adult Katlin Underwood,  Your thyroid hormone level is slightly elevated. This may be contributing to your fatigue and overall weakness. Let's start by cutting back on the 137 mcg tablet a couple days per week. So, skip the medication on Sunday and Wednesday. This is only a slight decrease but I am hopeful that you will see an improvement in your overall symptoms. Please let me know if you have any questions.  Thanks,  Katelyn

## 2024-05-19 ENCOUNTER — TELEPHONE (OUTPATIENT)
Dept: NURSING | Facility: CLINIC | Age: 69
End: 2024-05-19
Payer: MEDICARE

## 2024-05-19 ENCOUNTER — APPOINTMENT (OUTPATIENT)
Dept: MRI IMAGING | Facility: CLINIC | Age: 69
End: 2024-05-19
Attending: EMERGENCY MEDICINE
Payer: MEDICARE

## 2024-05-19 ENCOUNTER — HOSPITAL ENCOUNTER (EMERGENCY)
Facility: CLINIC | Age: 69
Discharge: HOME | End: 2024-05-19
Attending: EMERGENCY MEDICINE | Admitting: EMERGENCY MEDICINE
Payer: MEDICARE

## 2024-05-19 ENCOUNTER — NURSE TRIAGE (OUTPATIENT)
Dept: NURSING | Facility: CLINIC | Age: 69
End: 2024-05-19
Payer: MEDICARE

## 2024-05-19 VITALS
TEMPERATURE: 97 F | SYSTOLIC BLOOD PRESSURE: 162 MMHG | HEART RATE: 63 BPM | OXYGEN SATURATION: 97 % | DIASTOLIC BLOOD PRESSURE: 84 MMHG | HEIGHT: 63 IN | RESPIRATION RATE: 17 BRPM | BODY MASS INDEX: 30.16 KG/M2 | WEIGHT: 170.19 LBS

## 2024-05-19 DIAGNOSIS — R42 DIZZINESS: ICD-10-CM

## 2024-05-19 LAB
ANION GAP SERPL CALCULATED.3IONS-SCNC: 12 MMOL/L (ref 7–15)
BASOPHILS # BLD AUTO: 0.1 10E3/UL (ref 0–0.2)
BASOPHILS NFR BLD AUTO: 1 %
BUN SERPL-MCNC: 17.1 MG/DL (ref 8–23)
CALCIUM SERPL-MCNC: 9.6 MG/DL (ref 8.8–10.2)
CHLORIDE SERPL-SCNC: 104 MMOL/L (ref 98–107)
CREAT SERPL-MCNC: 0.6 MG/DL (ref 0.51–0.95)
DEPRECATED HCO3 PLAS-SCNC: 23 MMOL/L (ref 22–29)
EGFRCR SERPLBLD CKD-EPI 2021: >90 ML/MIN/1.73M2
EOSINOPHIL # BLD AUTO: 0.1 10E3/UL (ref 0–0.7)
EOSINOPHIL NFR BLD AUTO: 1 %
ERYTHROCYTE [DISTWIDTH] IN BLOOD BY AUTOMATED COUNT: 14 % (ref 10–15)
GLUCOSE SERPL-MCNC: 88 MG/DL (ref 70–99)
HCT VFR BLD AUTO: 45.6 % (ref 35–47)
HGB BLD-MCNC: 15.3 G/DL (ref 11.7–15.7)
IMM GRANULOCYTES # BLD: 0 10E3/UL
IMM GRANULOCYTES NFR BLD: 0 %
LYMPHOCYTES # BLD AUTO: 1.7 10E3/UL (ref 0.8–5.3)
LYMPHOCYTES NFR BLD AUTO: 28 %
MAGNESIUM SERPL-MCNC: 2.4 MG/DL (ref 1.7–2.3)
MCH RBC QN AUTO: 31.7 PG (ref 26.5–33)
MCHC RBC AUTO-ENTMCNC: 33.6 G/DL (ref 31.5–36.5)
MCV RBC AUTO: 95 FL (ref 78–100)
MONOCYTES # BLD AUTO: 0.4 10E3/UL (ref 0–1.3)
MONOCYTES NFR BLD AUTO: 7 %
NEUTROPHILS # BLD AUTO: 3.8 10E3/UL (ref 1.6–8.3)
NEUTROPHILS NFR BLD AUTO: 63 %
NRBC # BLD AUTO: 0 10E3/UL
NRBC BLD AUTO-RTO: 0 /100
PLATELET # BLD AUTO: 267 10E3/UL (ref 150–450)
POTASSIUM SERPL-SCNC: 4 MMOL/L (ref 3.4–5.3)
RBC # BLD AUTO: 4.82 10E6/UL (ref 3.8–5.2)
SODIUM SERPL-SCNC: 139 MMOL/L (ref 135–145)
TROPONIN T SERPL HS-MCNC: <6 NG/L
TSH SERPL DL<=0.005 MIU/L-ACNC: 0.38 UIU/ML (ref 0.3–4.2)
WBC # BLD AUTO: 6.1 10E3/UL (ref 4–11)

## 2024-05-19 PROCEDURE — 70549 MR ANGIOGRAPH NECK W/O&W/DYE: CPT | Mod: MF

## 2024-05-19 PROCEDURE — 255N000002 HC RX 255 OP 636: Performed by: EMERGENCY MEDICINE

## 2024-05-19 PROCEDURE — 85025 COMPLETE CBC W/AUTO DIFF WBC: CPT | Performed by: EMERGENCY MEDICINE

## 2024-05-19 PROCEDURE — 36415 COLL VENOUS BLD VENIPUNCTURE: CPT | Performed by: EMERGENCY MEDICINE

## 2024-05-19 PROCEDURE — 84484 ASSAY OF TROPONIN QUANT: CPT | Performed by: EMERGENCY MEDICINE

## 2024-05-19 PROCEDURE — 70544 MR ANGIOGRAPHY HEAD W/O DYE: CPT | Mod: MF

## 2024-05-19 PROCEDURE — 84443 ASSAY THYROID STIM HORMONE: CPT | Performed by: EMERGENCY MEDICINE

## 2024-05-19 PROCEDURE — 83735 ASSAY OF MAGNESIUM: CPT | Performed by: EMERGENCY MEDICINE

## 2024-05-19 PROCEDURE — 96361 HYDRATE IV INFUSION ADD-ON: CPT

## 2024-05-19 PROCEDURE — 70553 MRI BRAIN STEM W/O & W/DYE: CPT | Mod: MF

## 2024-05-19 PROCEDURE — 258N000003 HC RX IP 258 OP 636: Mod: JZ | Performed by: EMERGENCY MEDICINE

## 2024-05-19 PROCEDURE — 96374 THER/PROPH/DIAG INJ IV PUSH: CPT

## 2024-05-19 PROCEDURE — 80048 BASIC METABOLIC PNL TOTAL CA: CPT | Performed by: EMERGENCY MEDICINE

## 2024-05-19 PROCEDURE — 93005 ELECTROCARDIOGRAM TRACING: CPT

## 2024-05-19 PROCEDURE — 99285 EMERGENCY DEPT VISIT HI MDM: CPT | Mod: 25

## 2024-05-19 PROCEDURE — A9585 GADOBUTROL INJECTION: HCPCS | Performed by: EMERGENCY MEDICINE

## 2024-05-19 PROCEDURE — 250N000011 HC RX IP 250 OP 636: Mod: JZ | Performed by: EMERGENCY MEDICINE

## 2024-05-19 PROCEDURE — 250N000013 HC RX MED GY IP 250 OP 250 PS 637: Performed by: EMERGENCY MEDICINE

## 2024-05-19 RX ORDER — MECLIZINE HYDROCHLORIDE 25 MG/1
25 TABLET ORAL ONCE
Status: COMPLETED | OUTPATIENT
Start: 2024-05-19 | End: 2024-05-19

## 2024-05-19 RX ORDER — GADOBUTROL 604.72 MG/ML
10 INJECTION INTRAVENOUS ONCE
Status: COMPLETED | OUTPATIENT
Start: 2024-05-19 | End: 2024-05-19

## 2024-05-19 RX ORDER — MECLIZINE HYDROCHLORIDE 25 MG/1
25 TABLET ORAL 3 TIMES DAILY PRN
Qty: 30 TABLET | Refills: 0 | Status: SHIPPED | OUTPATIENT
Start: 2024-05-19 | End: 2024-09-20

## 2024-05-19 RX ORDER — LORAZEPAM 2 MG/ML
0.5 INJECTION INTRAMUSCULAR
Status: COMPLETED | OUTPATIENT
Start: 2024-05-19 | End: 2024-05-19

## 2024-05-19 RX ADMIN — LORAZEPAM 0.5 MG: 2 INJECTION INTRAMUSCULAR; INTRAVENOUS at 16:54

## 2024-05-19 RX ADMIN — GADOBUTROL 10 ML: 604.72 INJECTION INTRAVENOUS at 17:01

## 2024-05-19 RX ADMIN — SODIUM CHLORIDE 1000 ML: 9 INJECTION, SOLUTION INTRAVENOUS at 15:42

## 2024-05-19 RX ADMIN — MECLIZINE HYDROCHLORIDE 25 MG: 25 TABLET ORAL at 15:42

## 2024-05-19 ASSESSMENT — ACTIVITIES OF DAILY LIVING (ADL)
ADLS_ACUITY_SCORE: 35
ADLS_ACUITY_SCORE: 33
ADLS_ACUITY_SCORE: 33

## 2024-05-19 ASSESSMENT — COLUMBIA-SUICIDE SEVERITY RATING SCALE - C-SSRS
6. HAVE YOU EVER DONE ANYTHING, STARTED TO DO ANYTHING, OR PREPARED TO DO ANYTHING TO END YOUR LIFE?: NO
2. HAVE YOU ACTUALLY HAD ANY THOUGHTS OF KILLING YOURSELF IN THE PAST MONTH?: NO
1. IN THE PAST MONTH, HAVE YOU WISHED YOU WERE DEAD OR WISHED YOU COULD GO TO SLEEP AND NOT WAKE UP?: NO

## 2024-05-19 NOTE — ED TRIAGE NOTES
Pt comes in with light headedness for the past month.  Pt states that today the lightheadedness has become more of a room spinning dizziness and she has been nauseated.  Pt states that she did have a couple of drinks last night at a wedding and is not sure if that is causing some of the issues.  She states that her PCP is following her for the lightheadedness.       Triage Assessment (Adult)       Row Name 05/19/24 6629          Triage Assessment    Airway WDL WDL        Respiratory WDL    Respiratory WDL WDL        Cardiac WDL    Cardiac WDL WDL

## 2024-05-19 NOTE — TELEPHONE ENCOUNTER
Patient has been having lightheadedness for the last week and a half and today she went to lay down and the room was spinning. The lightheadedness remains, arms feel weak and there is some tingling, and some nausea. Some tingling in your her upper left cheek. Facial features are equal. Some confusion this week but nothing this morning. Patient felt fine this morning but symptoms hit suddenly when she walked into a bagel shop.   Protocol recommends ED or PCP triage  Page to on call provider Dr. Faisal aBrron recommending patient be seen in the ED.   Provider Recommendation Follow Up:   Reached patient/caregiver. Informed of provider's recommendations. Patient verbalized understanding and agrees with the plan.   Daughter will drive patient to [facility] ED now  Kelly Cates RN   05/19/24 12:02 PM  [facility] Nurse Advisor            Reason for Disposition   [1] Dizziness (vertigo) present now AND [2] one or more STROKE RISK FACTORS (i.e., hypertension, diabetes, prior stroke/TIA, heart attack)  (Exception: Prior doctor or NP/PA evaluation for this AND no different/worse than usual.)    Additional Information   Negative: [1] Weakness (i.e., paralysis, loss of muscle strength) of the face, arm or leg on one side of the body AND [2] sudden onset AND [3] present now   Negative: [1] Numbness (i.e., loss of sensation) of the face, arm or leg on one side of the body AND [2] sudden onset AND [3] present now   Negative: [1] Loss of speech or garbled speech AND [2] sudden onset AND [3] present now   Negative: Difficult to awaken or acting confused (e.g., disoriented, slurred speech)   Negative: Sounds like a life-threatening emergency to the triager   Negative: Followed a head injury   Negative: Followed an ear injury   Negative: Localized weakness or numbness is main symptom   Negative: Dizziness relates to riding in a car, going to an amusement park, etc.   Negative: [1] Dizziness is main symptom AND  [2] NO spinning sensation (i.e., vertigo)   Negative: SEVERE dizziness (vertigo) (e.g., unable to walk without assistance)   Negative: Severe headache (e.g., excruciating)   (Exception: Similar to previous migraines.)    Protocols used: Dizziness - Vertigo-A-AH

## 2024-05-19 NOTE — TELEPHONE ENCOUNTER
Telephone call  Patient was just triaged to go to the ED she would like to go to the urgent care and wondered if it was ok.  Explained she has the right to go where ever she wants but we have to recommend what the protocol says for the symptoms she has given us. And if she goes to urgent care they may send her to the ED if they feel that is a more appropriate place.    Katie Preciado RN   [facility] Nurse Advisor  12:17 PM 5/19/2024

## 2024-05-19 NOTE — ED PROVIDER NOTES
"  Emergency Department Note      History of Present Illness     Chief Complaint  Dizziness    HPI  Yasmine Sutton is a 68 year old female who presents with lightheadedness since end of April. Pt saw her PCP at beginning of may. There was concern that her baseline stress could be contributing. Sx worse in the AM and better in afternoon. Pt was at a wedding last night. On the way to breakfast she noticed room spinning sensation. She later developed tingling in hands. Also had nausea and felt weak. Pt did have some alcohol last night. No chest pain or trouble breathing. Pt reports thyroid med changes recently. Pt has occasional heart palpitations. Pt was supposed to get an echocardiogram due to family h/o of HOCM. No chest pain or shortness or breath. No double vision or missing vision or numbness/weakness, headaches. Pt had alcohol last night but this was not new for her.         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  Hypothyroidism  Leiomyoma of uterus  Osteopenia  Dysthymia    Medications  Synthroid    Surgical History   Abdominoplasty  Appendectomy  Cystoscopy  [place] hysterectomy total, bilateral Salpingo-oophorectomy, combined  Cholecystectomy  LEEP TX, cervical, JORDIN III  Embolization uterine fibroid    Physical Exam   Patient Vitals for the past 24 hrs:   BP Temp Temp src Pulse Resp SpO2 Height Weight   05/19/24 1319 (!) 162/84 97  F (36.1  C) Temporal 63 17 97 % 1.6 m (5' 3\") 77.2 kg (170 lb 3.1 oz)     Physical Exam  VS: Reviewed per above  HENT: Mucous membranes moist, no nuchal rigidity  EYES: sclera anicteric  CV: Rate as noted,  regular rhythm.   RESP: Effort normal. Breath sounds are normal bilaterally.  GI: no tenderness/rebound/guarding, not distended.  NEURO: GCS 15, cranial nerves II through XII are intact, 5 out of 5 strength in all 4 extremities, " sensation is intact light touch in all 4 extremities  MSK: No deformity of the extremities  SKIN: Warm and dry      Diagnostics   Lab Results   Labs Ordered and Resulted from Time of ED Arrival to Time of ED Departure   MAGNESIUM - Abnormal       Result Value    Magnesium 2.4 (*)    BASIC METABOLIC PANEL - Normal    Sodium 139      Potassium 4.0      Chloride 104      Carbon Dioxide (CO2) 23      Anion Gap 12      Urea Nitrogen 17.1      Creatinine 0.60      GFR Estimate >90      Calcium 9.6      Glucose 88     TROPONIN T, HIGH SENSITIVITY - Normal    Troponin T, High Sensitivity <6     TSH WITH FREE T4 REFLEX - Normal    TSH 0.38     CBC WITH PLATELETS AND DIFFERENTIAL    WBC Count 6.1      RBC Count 4.82      Hemoglobin 15.3      Hematocrit 45.6      MCV 95      MCH 31.7      MCHC 33.6      RDW 14.0      Platelet Count 267      % Neutrophils 63      % Lymphocytes 28      % Monocytes 7      % Eosinophils 1      % Basophils 1      % Immature Granulocytes 0      NRBCs per 100 WBC 0      Absolute Neutrophils 3.8      Absolute Lymphocytes 1.7      Absolute Monocytes 0.4      Absolute Eosinophils 0.1      Absolute Basophils 0.1      Absolute Immature Granulocytes 0.0      Absolute NRBCs 0.0       Imaging  MR Brain w/o & w Contrast   Final Result   IMPRESSION:   HEAD MRI:    1.  Normal head MRI.      HEAD MRA:    1.  Normal MRA [place].      NECK MRA:   1.  Normal neck MRA.      MRA Angiogram Head w/o Contrast   Final Result   IMPRESSION:   HEAD MRI:    1.  Normal head MRI.      HEAD MRA:    1.  Normal MRA [place].      NECK MRA:   1.  Normal neck MRA.      MRA Angiogram Neck w/o & w Contrast   Final Result   IMPRESSION:   HEAD MRI:    1.  Normal head MRI.      HEAD MRA:    1.  Normal MRA [place].      NECK MRA:   1.  Normal neck MRA.        EKG   ECG taken at 1538, ECG read at 1604  Sinus bradycardia  Possible Inferior infarct, age undetermined  Abnormal ECG   No significant change as compared  to prior, dated 7/24/14.  Rate 57 bpm. MA interval 184 ms. QRS duration 84 ms. QT/QTc 452/439 ms. P-R-T axes 16 8 24.    Independent Interpretation  None    ED Course    Medications Administered  Medications   meclizine (ANTIVERT) tablet 25 mg (25 mg Oral $Given 5/19/24 1542)   sodium chloride 0.9% BOLUS 1,000 mL (0 mLs Intravenous Stopped 5/19/24 1835)   LORazepam (ATIVAN) injection 0.5 mg (0.5 mg Intravenous $Given 5/19/24 1654)   gadobutrol (GADAVIST) injection 10 mL (10 mLs Intravenous $Given 5/19/24 1701)   sodium chloride (PF) 0.9% PF flush 60 mL (100 mLs Intravenous $Given 5/19/24 1701)          Medical Decision Making / Diagnosis       ALBERTO Sutton is a 68 year old female who presents to the ER for subacute dizzy spells that were mostly lightheaded but now are more room spinning today.  Vital signs reassuring.  Neurologic exam is unremarkable.  ECG/Labs not reveal obvious cardiac or metabolic cause of her symptoms.  Considered both central and peripheral causes of vertigo.  We discussed limitations of exam and patient preferred more urgent neuroimaging.  Fortunately MRIs of the brain did not reveal central cause of her vertigo.  Plan for discharge with as needed meclizine and follow-up with primary care or dizzy and balance clinic as desired.  Return precautions discussed.    Disposition  The patient was discharged.     ICD-10 Codes:    ICD-10-CM    1. Dizziness  R42            Discharge Medications  Discharge Medication List as of 5/19/2024  6:36 PM        START taking these medications    Details   meclizine (ANTIVERT) 25 MG tablet Take 1 tablet (25 mg) by mouth 3 times daily as needed for dizziness, Disp-30 tablet, R-0, E-Prescribe           Scribe Disclosure:  I, Saulo Bhatti, am serving as a scribe at 4:07 PM on 5/19/2024 to document services personally performed by Nickolas Alcantar MD based on my observations and the provider's statements to me.        Nickolas Alcantar MD  05/19/24  [id number]

## 2024-05-20 LAB
ATRIAL RATE - MUSE: 57 BPM
DIASTOLIC BLOOD PRESSURE - MUSE: NORMAL MMHG
INTERPRETATION ECG - MUSE: NORMAL
P AXIS - MUSE: 16 DEGREES
PR INTERVAL - MUSE: 184 MS
QRS DURATION - MUSE: 84 MS
QT - MUSE: 452 MS
QTC - MUSE: 439 MS
R AXIS - MUSE: 8 DEGREES
SYSTOLIC BLOOD PRESSURE - MUSE: NORMAL MMHG
T AXIS - MUSE: 24 DEGREES
VENTRICULAR RATE- MUSE: 57 BPM

## 2024-05-24 ENCOUNTER — OFFICE VISIT (OUTPATIENT)
Dept: FAMILY MEDICINE | Facility: CLINIC | Age: 69
End: 2024-05-24
Payer: MEDICARE

## 2024-05-24 VITALS
HEIGHT: 63 IN | DIASTOLIC BLOOD PRESSURE: 82 MMHG | OXYGEN SATURATION: 98 % | RESPIRATION RATE: 24 BRPM | HEART RATE: 61 BPM | BODY MASS INDEX: 29.54 KG/M2 | TEMPERATURE: 97.3 F | WEIGHT: 166.7 LBS | SYSTOLIC BLOOD PRESSURE: 126 MMHG

## 2024-05-24 DIAGNOSIS — R94.31 ABNORMAL ELECTROCARDIOGRAM: ICD-10-CM

## 2024-05-24 DIAGNOSIS — R06.09 DYSPNEA ON EXERTION: ICD-10-CM

## 2024-05-24 DIAGNOSIS — R42 VERTIGO: Primary | ICD-10-CM

## 2024-05-24 DIAGNOSIS — Z13.6 SCREENING FOR HEART DISEASE: ICD-10-CM

## 2024-05-24 PROCEDURE — 99213 OFFICE O/P EST LOW 20 MIN: CPT | Performed by: PHYSICIAN ASSISTANT

## 2024-05-24 RX ORDER — RESPIRATORY SYNCYTIAL VIRUS VACCINE 120MCG/0.5
0.5 KIT INTRAMUSCULAR ONCE
Qty: 1 EACH | Refills: 0 | Status: CANCELLED | OUTPATIENT
Start: 2024-05-24 | End: 2024-05-24

## 2024-05-24 ASSESSMENT — PATIENT HEALTH QUESTIONNAIRE - PHQ9
SUM OF ALL RESPONSES TO PHQ QUESTIONS 1-9: 0
10. IF YOU CHECKED OFF ANY PROBLEMS, HOW DIFFICULT HAVE THESE PROBLEMS MADE IT FOR YOU TO DO YOUR WORK, TAKE CARE OF THINGS AT HOME, OR GET ALONG WITH OTHER PEOPLE: NOT DIFFICULT AT ALL
SUM OF ALL RESPONSES TO PHQ QUESTIONS 1-9: 0

## 2024-05-24 ASSESSMENT — PAIN SCALES - GENERAL: PAINLEVEL: NO PAIN (0)

## 2024-05-24 NOTE — PROGRESS NOTES
"  Assessment & Plan     Advised to get a stress echo and follow-up with cardiolgy to discuss CAGLE and abnormal EKG    Also will have her see the dizzy and balance center for vertigo      Abnormal electrocardiogram    - Adult Cardiology Eval  Referral; Future    Dyspnea on exertion    - Echocardiogram Exercise Stress; Future    Vertigo    - Adult Neurology  Referral; Future    Screening for heart disease    - CT Coronary Calcium Scan; Future        MED REC REQUIRED  Post Medication Reconciliation Status:           Angelica Underwood is a 68 year old, presenting for the following health issues:  ER F/U      5/24/2024    12:38 PM   Additional Questions   Roomed by Lizzie   Accompanied by self     HPI     Has had episodes of feeling light headed which come and go and has had episodes of feeling room spinning. She thinks may be due to stress.     Also, she has additional complaints of ongoing dyspnea on exertion. .      ED/UC Followup:    Facility:  ED [facility]  Date of visit: 5/19/24  Reason for visit: dizziness lighthheaded  Current Status: better        Review of Systems  Constitutional, neuro, ENT, endocrine, pulmonary, cardiac, gastrointestinal, genitourinary, musculoskeletal, integument and psychiatric systems are negative, except as otherwise noted.      Objective    /82 (BP Location: Right arm, Patient Position: Sitting, Cuff Size: Adult Regular)   Pulse 61   Temp 97.3  F (36.3  C) (Oral)   Resp 24   Ht 1.588 m (5' 2.5\")   Wt 75.6 kg (166 lb 11.2 oz)   LMP 05/08/2008   SpO2 98%   BMI 30.00 kg/m    Body mass index is 30 kg/m .  Physical Exam   GENERAL: alert and no distress  EYES: Eyes grossly normal to inspection, PERRL and conjunctivae and sclerae normal  HENT: ear canals and TM's normal, nose and mouth without ulcers or lesions  NECK: no adenopathy, no asymmetry, masses, or scars  RESP: lungs clear to auscultation - no rales, rhonchi or wheezes  CV: regular rate " and rhythm, normal S1 S2, no S3 or S4, no murmur, click or rub, no peripheral edema  ABDOMEN: soft, nontender, no hepatosplenomegaly, no masses and bowel sounds normal  MS: no gross musculoskeletal defects noted, no edema  PSYCH: mentation appears normal, affect normal/bright            Signed Electronically by: Ramona Ann Aaseby-Aguilera, PA-C    Answers submitted by the patient for this visit:  Patient Health Questionnaire (Submitted on 5/24/2024)  If you checked off any problems, how difficult have these problems made it for you to do your work, take care of things at home, or get along with other people?: Not difficult at all  PHQ9 TOTAL SCORE: 0

## 2024-06-07 ENCOUNTER — HOSPITAL ENCOUNTER (OUTPATIENT)
Dept: CARDIOLOGY | Facility: CLINIC | Age: 69
Discharge: HOME | End: 2024-06-07
Attending: PHYSICIAN ASSISTANT | Admitting: PHYSICIAN ASSISTANT
Payer: MEDICARE

## 2024-06-07 DIAGNOSIS — R06.09 DYSPNEA ON EXERTION: ICD-10-CM

## 2024-06-07 PROCEDURE — 93325 DOPPLER ECHO COLOR FLOW MAPG: CPT | Mod: 26 | Performed by: INTERNAL MEDICINE

## 2024-06-07 PROCEDURE — 93321 DOPPLER ECHO F-UP/LMTD STD: CPT | Mod: 26 | Performed by: INTERNAL MEDICINE

## 2024-06-07 PROCEDURE — 93016 CV STRESS TEST SUPVJ ONLY: CPT | Performed by: INTERNAL MEDICINE

## 2024-06-07 PROCEDURE — 93018 CV STRESS TEST I&R ONLY: CPT | Performed by: INTERNAL MEDICINE

## 2024-06-07 PROCEDURE — 255N000002 HC RX 255 OP 636: Performed by: PHYSICIAN ASSISTANT

## 2024-06-07 PROCEDURE — 999N000208 ECHO STRESS ECHOCARDIOGRAM

## 2024-06-07 PROCEDURE — 93350 STRESS TTE ONLY: CPT | Mod: 26 | Performed by: INTERNAL MEDICINE

## 2024-06-07 PROCEDURE — C8928 TTE W OR W/O FOL W/CON,STRES: HCPCS

## 2024-06-07 RX ADMIN — HUMAN ALBUMIN MICROSPHERES AND PERFLUTREN 7 ML: 10; .22 INJECTION, SOLUTION INTRAVENOUS at 13:27

## 2024-06-13 ENCOUNTER — OFFICE VISIT (OUTPATIENT)
Dept: CARDIOLOGY | Facility: CLINIC | Age: 69
End: 2024-06-13
Payer: MEDICARE

## 2024-06-13 VITALS
DIASTOLIC BLOOD PRESSURE: 74 MMHG | SYSTOLIC BLOOD PRESSURE: 132 MMHG | HEART RATE: 60 BPM | BODY MASS INDEX: 29.8 KG/M2 | WEIGHT: 168.2 LBS | HEIGHT: 63 IN

## 2024-06-13 DIAGNOSIS — Z82.49 FAMILY HISTORY OF ISCHEMIC HEART DISEASE: ICD-10-CM

## 2024-06-13 DIAGNOSIS — R94.31 ABNORMAL ELECTROCARDIOGRAM: ICD-10-CM

## 2024-06-13 DIAGNOSIS — E78.2 MIXED HYPERLIPIDEMIA: ICD-10-CM

## 2024-06-13 DIAGNOSIS — R94.39 ABNORMAL CARDIOVASCULAR STRESS TEST: Primary | ICD-10-CM

## 2024-06-13 DIAGNOSIS — R06.09 DYSPNEA ON EXERTION: ICD-10-CM

## 2024-06-13 DIAGNOSIS — E66.811 CLASS 1 OBESITY DUE TO EXCESS CALORIES WITHOUT SERIOUS COMORBIDITY WITH BODY MASS INDEX (BMI) OF 30.0 TO 30.9 IN ADULT: ICD-10-CM

## 2024-06-13 DIAGNOSIS — E66.09 CLASS 1 OBESITY DUE TO EXCESS CALORIES WITHOUT SERIOUS COMORBIDITY WITH BODY MASS INDEX (BMI) OF 30.0 TO 30.9 IN ADULT: ICD-10-CM

## 2024-06-13 PROCEDURE — G2211 COMPLEX E/M VISIT ADD ON: HCPCS | Performed by: INTERNAL MEDICINE

## 2024-06-13 PROCEDURE — 99204 OFFICE O/P NEW MOD 45 MIN: CPT | Performed by: INTERNAL MEDICINE

## 2024-06-13 NOTE — PROGRESS NOTES
CARDIOLOGY CLINIC CONSULTATION      REASON FOR CONSULT:   Dyspnea on exertion, abnormal stress test    PRIMARY CARE PHYSICIAN:  Ramona Ann Aaseby-Aguilera        History of Present Illness   Yasmine Sutton is an extremely pleasant 68 year old female here as a new patient to establish care.  Medical history is significant for HLD, obesity, and hypothyroidism.  She has a strong FH of CAD, with her father dying of MI at age 69, her younger sister dying of MI in her 50's, and another younger sister likely to need CABG in her 50's.  Her mother had a small MI later in life.  Patient is a former smoker, having quit in  (roughly 0.5 ppd).  Other family members with severe CAD were heavy smokers.    She reports dyspnea on exertion which has been gradually progressive over the past 6 years.  No abrupt changes, and she continues to walk 2.5-3 miles most days per week, but has been slowing down gradually.  No chest pain or other cardiac symptoms.    Most recent ECG from 2024 showed borderline inferior Q waves.  She then had an COURTNEY on 2024 where she exercised for 9:31 on a [place] protocol (11 METs), with no ECG evidence of ischemia and normal resting wall motion, but there was a suggestion of mild anterior hypokinesis on the post-stress 2 chamber clips, but not seen on the PSA views.  She has a coronary calcium scan ordered.      Assessment & Plan     Chronic dyspnea on exertion, gradually progressive, non-specific  COURTNEY with good exercise capacity but possible inducible anterior WMA  HLD  Obesity  Hypothyroidism  Severe FH of CAD (father  of MI at 69, sister  of MI in her 50's, another sister likely needing CABG in her 50's)  Former tobacco abuse (quit in , previously 0.5 ppd)      It was a pleasure to speak with Yasmine in clinic today.  I recommended that we check a coronary CTA to further investigate her dyspnea and equivocal stress test results.  In addition, we should check PFT's as her dyspnea may  also reflect undiagnosed COPD.  Based on the results of these tests we will make further plans.      - Coronary CTA  - PFT's  - Further plans, including potential invasive angiography and/or medical therapy with aspirin/statin, pending results of above testing      Follow-up:  1 month with RANDY, or sooner PRN        Malcolm Montano MD  Interventional Cardiology  June 13, 2024      The longitudinal plan of care for the diagnosis(es)/condition(s) as documented were addressed during this visit. Due to the added complexity in care, I will continue to support Yasmine in the subsequent management and with ongoing continuity of care.        Medications   Current Outpatient Medications   Medication Sig Dispense Refill    SYNTHROID 137 MCG tablet Take 1 tablet (137 mcg) by mouth daily 90 tablet 3    meclizine (ANTIVERT) 25 MG tablet Take 1 tablet (25 mg) by mouth 3 times daily as needed for dizziness (Patient not taking: Reported on 6/13/2024) 30 tablet 0     No current facility-administered medications for this visit.     Allergies   No Known Allergies      Physical Exam       BP: 132/74 Pulse: 60            Vital Signs with Ranges  Pulse:  [60] 60  BP: (132)/(74) 132/74  168 lbs 3.2 oz    Constitutional:  Well appearing, NAD  Respiratory: Normal respiratory effort, CTAB  Cardiovascular: RRR, no m/r/g.  JVP < 7 cm H2O.  There is no LE edema.  Normal carotid upstrokes, no carotid bruits.

## 2024-06-13 NOTE — LETTER
2024    Ramona Ann Aaseby-Aguilera, PA-C  [id number]  [facility] [address]    RE: Yasmine Sutton       Dear Colleague,     I had the pleasure of seeing Yasmine Sutton in the [facility].  CARDIOLOGY CLINIC CONSULTATION      REASON FOR CONSULT:   Dyspnea on exertion, abnormal stress test    PRIMARY CARE PHYSICIAN:  Ramona Ann Aaseby-Aguilera        History of Present Illness  Yasmine Sutton is an extremely pleasant 68 year old female here as a new patient to establish care.  Medical history is significant for HLD, obesity, and hypothyroidism.  She has a strong FH of CAD, with her father dying of MI at age 69, her younger sister dying of MI in her 50's, and another younger sister likely to need CABG in her 50's.  Her mother had a small MI later in life.  Patient is a former smoker, having quit in  (roughly 0.5 ppd).  Other family members with severe CAD were heavy smokers.    She reports dyspnea on exertion which has been gradually progressive over the past 6 years.  No abrupt changes, and she continues to walk 2.5-3 miles most days per week, but has been slowing down gradually.  No chest pain or other cardiac symptoms.    Most recent ECG from 2024 showed borderline inferior Q waves.  She then had an COURTNEY on 2024 where she exercised for 9:31 on a [place] protocol (11 METs), with no ECG evidence of ischemia and normal resting wall motion, but there was a suggestion of mild anterior hypokinesis on the post-stress 2 chamber clips, but not seen on the PSA views.  She has a coronary calcium scan ordered.      Assessment & Plan    Chronic dyspnea on exertion, gradually progressive, non-specific  COURTNEY with good exercise capacity but possible inducible anterior WMA  HLD  Obesity  Hypothyroidism  Severe FH of CAD (father  of MI at 69, sister  of MI in her 50's, another sister likely needing CABG in her 50's)  Former tobacco abuse (quit in , previously 0.5 ppd)      It was a  pleasure to speak with Yasmine in clinic today.  I recommended that we check a coronary CTA to further investigate her dyspnea and equivocal stress test results.  In addition, we should check PFT's as her dyspnea may also reflect undiagnosed COPD.  Based on the results of these tests we will make further plans.      - Coronary CTA  - PFT's  - Further plans, including potential invasive angiography and/or medical therapy with aspirin/statin, pending results of above testing      Follow-up:  1 month with RANDY, or sooner PRN        Malcolm Montano MD  Interventional Cardiology  June 13, 2024      The longitudinal plan of care for the diagnosis(es)/condition(s) as documented were addressed during this visit. Due to the added complexity in care, I will continue to support Yasmine in the subsequent management and with ongoing continuity of care.        Medications  Current Outpatient Medications   Medication Sig Dispense Refill    SYNTHROID 137 MCG tablet Take 1 tablet (137 mcg) by mouth daily 90 tablet 3    meclizine (ANTIVERT) 25 MG tablet Take 1 tablet (25 mg) by mouth 3 times daily as needed for dizziness (Patient not taking: Reported on 6/13/2024) 30 tablet 0     No current facility-administered medications for this visit.     Allergies  No Known Allergies      Physical Exam      BP: 132/74 Pulse: 60            Vital Signs with Ranges  Pulse:  [60] 60  BP: (132)/(74) 132/74  168 lbs 3.2 oz    Constitutional:  Well appearing, NAD  Respiratory: Normal respiratory effort, CTAB  Cardiovascular: RRR, no m/r/g.  JVP < 7 cm H2O.  There is no LE edema.  Normal carotid upstrokes, no carotid bruits.      Thank you for allowing me to participate in the care of your patient.      Sincerely,     Malcolm Montano MD     [facility]  cc:   Ramona Ann Aaseby-Aguilera, ANJUM  [address] [place] [address]

## 2024-06-25 SDOH — HEALTH STABILITY: PHYSICAL HEALTH: ON AVERAGE, HOW MANY DAYS PER WEEK DO YOU ENGAGE IN MODERATE TO STRENUOUS EXERCISE (LIKE A BRISK WALK)?: 5 DAYS

## 2024-06-25 SDOH — HEALTH STABILITY: PHYSICAL HEALTH: ON AVERAGE, HOW MANY MINUTES DO YOU ENGAGE IN EXERCISE AT THIS LEVEL?: 30 MIN

## 2024-06-25 ASSESSMENT — SOCIAL DETERMINANTS OF HEALTH (SDOH): HOW OFTEN DO YOU GET TOGETHER WITH FRIENDS OR RELATIVES?: THREE TIMES A WEEK

## 2024-06-27 ENCOUNTER — ANCILLARY PROCEDURE (OUTPATIENT)
Dept: BONE DENSITY | Facility: CLINIC | Age: 69
End: 2024-06-27
Attending: PHYSICIAN ASSISTANT
Payer: MEDICARE

## 2024-06-27 ENCOUNTER — OFFICE VISIT (OUTPATIENT)
Dept: FAMILY MEDICINE | Facility: CLINIC | Age: 69
End: 2024-06-27
Attending: PHYSICIAN ASSISTANT
Payer: MEDICARE

## 2024-06-27 VITALS
SYSTOLIC BLOOD PRESSURE: 126 MMHG | WEIGHT: 165 LBS | OXYGEN SATURATION: 95 % | DIASTOLIC BLOOD PRESSURE: 70 MMHG | TEMPERATURE: 97.6 F | RESPIRATION RATE: 16 BRPM | HEIGHT: 63 IN | HEART RATE: 58 BPM | BODY MASS INDEX: 29.23 KG/M2

## 2024-06-27 DIAGNOSIS — Z12.11 SCREEN FOR COLON CANCER: ICD-10-CM

## 2024-06-27 DIAGNOSIS — F33.42 MAJOR DEPRESSIVE DISORDER, RECURRENT EPISODE, IN FULL REMISSION (H): ICD-10-CM

## 2024-06-27 DIAGNOSIS — E55.9 VITAMIN D DEFICIENCY: ICD-10-CM

## 2024-06-27 DIAGNOSIS — M85.80 OSTEOPENIA, UNSPECIFIED LOCATION: ICD-10-CM

## 2024-06-27 DIAGNOSIS — E03.9 ACQUIRED HYPOTHYROIDISM: ICD-10-CM

## 2024-06-27 DIAGNOSIS — M85.89 OTHER SPECIFIED DISORDERS OF BONE DENSITY AND STRUCTURE, MULTIPLE SITES: ICD-10-CM

## 2024-06-27 DIAGNOSIS — N95.1 SYMPTOMATIC MENOPAUSAL OR FEMALE CLIMACTERIC STATES: ICD-10-CM

## 2024-06-27 DIAGNOSIS — Z00.00 ENCOUNTER FOR MEDICARE ANNUAL WELLNESS EXAM: Primary | ICD-10-CM

## 2024-06-27 PROCEDURE — G0439 PPPS, SUBSEQ VISIT: HCPCS | Performed by: PHYSICIAN ASSISTANT

## 2024-06-27 PROCEDURE — 77080 DXA BONE DENSITY AXIAL: CPT | Mod: TC | Performed by: PHYSICIAN ASSISTANT

## 2024-06-27 RX ORDER — RESPIRATORY SYNCYTIAL VIRUS VACCINE 120MCG/0.5
0.5 KIT INTRAMUSCULAR ONCE
Qty: 1 EACH | Refills: 0 | Status: CANCELLED | OUTPATIENT
Start: 2024-06-27 | End: 2024-06-27

## 2024-06-27 ASSESSMENT — PATIENT HEALTH QUESTIONNAIRE - PHQ9
SUM OF ALL RESPONSES TO PHQ QUESTIONS 1-9: 6
10. IF YOU CHECKED OFF ANY PROBLEMS, HOW DIFFICULT HAVE THESE PROBLEMS MADE IT FOR YOU TO DO YOUR WORK, TAKE CARE OF THINGS AT HOME, OR GET ALONG WITH OTHER PEOPLE: NOT DIFFICULT AT ALL
SUM OF ALL RESPONSES TO PHQ QUESTIONS 1-9: 6

## 2024-06-27 NOTE — PROGRESS NOTES
Preventive Care Visit  [facility], Family Medicine  Jun 27, 2024      Assessment & Plan   (Z00.00) Encounter for Medicare annual wellness exam  (primary encounter diagnosis)  Comment:   Plan:     (Z12.11) Screen for colon cancer  Comment:   Plan: Colonoscopy Screening  Referral            (F33.42) Major depressive disorder, recurrent episode, in full remission (H24)  Comment:   Plan: stable                   Counseling  Appropriate preventive services were discussed with this patient, including applicable screening as appropriate for fall prevention, nutrition, physical activity, Tobacco-use cessation, weight loss and cognition.  Checklist reviewing preventive services available has been given to the patient.  Reviewed patient's diet, addressing concerns and/or questions.   The patient was instructed to see the dentist every 6 months.   The patient's PHQ-9 score is consistent with mild depression. She was provided with information regarding depression.           Angelica Underwood is a 68 year old, presenting for the following:  Medicare Visit        6/27/2024     8:49 AM   Additional Questions   Roomed by PAMELA Way   Accompanied by Self         Health Care Directive  Patient does not have a Health Care Directive or Living Will: Discussed advance care planning with patient; however, patient declined at this time.    HPI            6/25/2024   General Health   How would you rate your overall physical health? Good   Feel stress (tense, anxious, or unable to sleep) Patient declined            6/25/2024   Nutrition   Diet: Regular (no restrictions)    I don't know       Multiple values from one day are sorted in reverse-chronological order         6/25/2024   Exercise   Days per week of moderate/strenous exercise 5 days   Average minutes spent exercising at this level 30 min            6/25/2024   Social Factors   Frequency of gathering with  friends or relatives Three times a week   Worry food won't last until get money to buy more No   Food not last or not have enough money for food? No   Do you have housing? (Housing is defined as stable permanent housing and does not include staying ouside in a car, in a tent, in an abandoned building, in an overnight shelter, or couch-surfing.) Yes   Are you worried about losing your housing? No   Lack of transportation? No   Unable to get utilities (heat,electricity)? No            6/25/2024   Fall Risk   Fallen 2 or more times in the past year? No   Trouble with walking or balance? No             6/25/2024   Activities of Daily Living- Home Safety   Needs help with the following daily activites None of the above   Safety concerns in the home None of the above            6/25/2024   Dental   Dentist two times every year? (!) NO            6/25/2024   Hearing Screening   Hearing concerns? None of the above            6/25/2024   Driving Risk Screening   Patient/family members have concerns about driving No            6/25/2024   General Alertness/Fatigue Screening   Have you been more tired than usual lately? (!) DECLINE            6/25/2024   Urinary Incontinence Screening   Bothered by leaking urine in past 6 months No            6/25/2024   TB Screening   Were you born outside of the [place]? No          Today's PHQ-9 Score:       6/27/2024     8:45 AM   PHQ-9 SCORE   PHQ-9 Total Score MyChart 6 (Mild depression)   PHQ-9 Total Score 6         6/25/2024   Substance Use   Alcohol more than 3/day or more than 7/wk Not Applicable   Do you have a current opioid prescription? No   How severe/bad is pain from 1 to 10? 0/10 (No Pain)   Do you use any other substances recreationally? (!) ALCOHOL    (!) BATH SALTS       Multiple values from one day are sorted in reverse-chronological order     Social History     Tobacco Use    Smoking status: Former     Current packs/day: 0.00     Average packs/day: 0.5 packs/day for 18.0 years  (9.0 ttl pk-yrs)     Types: Cigarettes     Start date: 1993     Quit date: 2011     Years since quittin.4    Smokeless tobacco: Former    Tobacco comments:     e cig for a short time   Vaping Use    Vaping status: Never Used   Substance Use Topics    Alcohol use: Yes     Alcohol/week: 5.0 standard drinks of alcohol     Types: 5 Standard drinks or equivalent per week     Comment: occasionally    Drug use: No           2023   LAST FHS-7 RESULTS   1st degree relative breast or ovarian cancer No   Any relative bilateral breast cancer No   Any male have breast cancer No   Any ONE woman have BOTH breast AND ovarian cancer No   Any woman with breast cancer before 50yrs No   2 or more relatives with breast AND/OR ovarian cancer No   2 or more relatives with breast AND/OR bowel cancer No           Mammogram Screening - Mammogram every 1-2 years updated in Health Maintenance based on mutual decision making      History of abnormal Pap smear: Status post hysterectomy with removal of cervix and no history of CIN2 or greater or cervical cancer. Health Maintenance and Surgical History updated.        2014    12:00 AM 3/10/2014    12:00 AM 2012    10:41 AM   PAP / HPV   PAP (Historical) ASC-H  NIL  NIL      ASCVD Risk   The ASCVD Risk score (Frank STARR, et al., 2019) failed to calculate for the following reasons:    The valid HDL cholesterol range is 20 to 100 mg/dL            Reviewed and updated as needed this visit by Provider                    Recent Labs   Lab Test 24  1526 24  0924 23  1019 22  1438 22  1438 21  1057 19  1103 19  0806   LDL  --  142* 154*  --  125* 123*  --  113*   HDL  --  103 106  --  106 103  --  103   TRIG  --  128 108  --  100 112  --  84   ALT  --   --  13  --  20 19  --  18   CR 0.60 0.72 0.74   < > 0.73 0.72  --  0.70   GFRESTIMATED >90 >90 89   < > >90 89  --  >90   GFRESTBLACK  --   --   --   --   --  >90  --  >90    POTASSIUM 4.0 4.6 4.3   < > 4.1 4.1  --  4.4   TSH 0.38 1.11 0.67   < > 0.52 0.75   < > 1.57    < > = values in this interval not displayed.      Current providers sharing in care for this patient include:  Patient Care Team:  Aaseby-Aguilera, Ramona Ann, PA-C as PCP - General (Family Medicine)  Aaseby-Aguilera, Ramona Ann, PA-C as Assigned PCP  Katelyn Meza PA-C as Assigned Endocrinology Provider  Malcolm Montano MD as MD (Cardiovascular Disease)  Malcolm Montano MD as Assigned Heart and Vascular Provider    The following health maintenance items are reviewed in Epic and correct as of today:  Health Maintenance   Topic Date Due    DEPRESSION ACTION PLAN  Never done    ZOSTER IMMUNIZATION (1 of 2) Never done    LUNG CANCER SCREENING  Never done    RSV VACCINE (Pregnancy & 60+) (1 - 1-dose 60+ series) Never done    Pneumococcal Vaccine: 65+ Years (1 of 1 - PCV) Never done    COLORECTAL CANCER SCREENING  05/02/2023    COVID-19 Vaccine (3 - 2023-24 season) 09/01/2023    MEDICARE ANNUAL WELLNESS VISIT  05/12/2024    ANNUAL REVIEW OF HM ORDERS  05/12/2024    INFLUENZA VACCINE (Season Ended) 09/01/2024    PHQ-9  12/27/2024    LIPID  04/29/2025    TSH W/FREE T4 REFLEX  05/19/2025    MAMMO SCREENING  06/05/2025    FALL RISK ASSESSMENT  06/27/2025    GLUCOSE  05/19/2027    ADVANCE CARE PLANNING  06/27/2029    DEXA  09/08/2030    DTAP/TDAP/TD IMMUNIZATION (2 - Td or Tdap) 05/03/2032    HEPATITIS C SCREENING  Completed    IPV IMMUNIZATION  Aged Out    HPV IMMUNIZATION  Aged Out    MENINGITIS IMMUNIZATION  Aged Out    RSV MONOCLONAL ANTIBODY  Aged Out    PAP  Discontinued         Review of Systems  Constitutional, neuro, ENT, endocrine, pulmonary, cardiac, gastrointestinal, genitourinary, musculoskeletal, integument and psychiatric systems are negative, except as otherwise noted.     Objective    Exam  /70 (BP Location: Left arm, Patient Position: Sitting, Cuff Size: Adult Regular)   Pulse 58   Temp  "97.6  F (36.4  C) (Oral)   Resp 16   Ht 1.588 m (5' 2.5\")   Wt 74.8 kg (165 lb)   LMP 05/08/2008 (Approximate)   SpO2 95%   Breastfeeding No   BMI 29.70 kg/m     Estimated body mass index is 29.7 kg/m  as calculated from the following:    Height as of this encounter: 1.588 m (5' 2.5\").    Weight as of this encounter: 74.8 kg (165 lb).    Physical Exam  GENERAL: alert and no distress  HENT: ear canals and TM's normal, nose and mouth without ulcers or lesions  RESP: lungs clear to auscultation - no rales, rhonchi or wheezes  CV: regular rate and rhythm, normal S1 S2, no S3 or S4, no murmur, click or rub, no peripheral edema   ABDOMEN: soft, nontender, no hepatosplenomegaly, no masses and bowel sounds normal  MS: no gross musculoskeletal defects noted, no edema  SKIN: no suspicious lesions or rashes  NEURO: Normal strength and tone, mentation intact and speech normal  PSYCH: mentation appears normal, affect normal/bright        6/27/2024   Mini Cog   Clock Draw Score 2 Normal   3 Item Recall 3 objects recalled   Mini Cog Total Score 5                 Signed Electronically by: Ramona Ann Aaseby-Aguilera, PA-C    Answers submitted by the patient for this visit:  Patient Health Questionnaire (Submitted on 6/27/2024)  If you checked off any problems, how difficult have these problems made it for you to do your work, take care of things at home, or get along with other people?: Not difficult at all  PHQ9 TOTAL SCORE: 6    "

## 2024-06-27 NOTE — PATIENT INSTRUCTIONS
"Needs pneumococcal 23  Patient Education   Preventive Care Advice   This is general advice we often give to help people stay healthy. Your care team may have specific advice just for you. Please talk to your care team about your own preventive care needs.  Lifestyle  Exercise at least 150 minutes each week (30 minutes a day, 5 days a week).  Do muscle strengthening activities 2 days a week. These help control your weight and prevent disease.  No smoking.  Wear sunscreen to prevent skin cancer.  Have your home tested for radon every 2 to 5 years. Radon is a colorless, odorless gas that can harm your lungs. To learn more, go to www.health.[facility].[place].us and search for \"Radon in Homes.\"  Keep guns unloaded and locked up in a safe place like a safe or gun vault, or, use a gun lock and hide the keys. Always lock away bullets separately. To learn more, visit [employer].[place].gov and search for \"safe gun storage.\"  Nutrition  Eat 5 or more servings of fruits and vegetables each day.  Try wheat bread, brown rice and whole grain pasta (instead of white bread, rice, and pasta).  Get enough calcium and vitamin D. Check the label on foods and aim for 100% of the RDA (recommended daily allowance).  Regular exams  Have a dental exam and cleaning every 6 months.  See your health care team every year to talk about:  Any changes in your health.  Any medicines your care team has prescribed.  Preventive care, family planning, and ways to prevent chronic diseases.  Shots (vaccines)   HPV shots (up to age 26), if you've never had them before.  Hepatitis B shots (up to age 59), if you've never had them before.  COVID-19 shot: Get this shot when it's due.  Flu shot: Get a flu shot every year.  Tetanus shot: Get a tetanus shot every 10 years.  Pneumococcal, hepatitis A, and RSV shots: Ask your care team if you need these based on your risk.  Shingles shot (for age 50 and up).  General health tests  Diabetes screening:  Starting at age 35, Get screened " for diabetes at least every 3 years.  If you are younger than age 35, ask your care team if you should be screened for diabetes.  Cholesterol test: At age 39, start having a cholesterol test every 5 years, or more often if advised.  Bone density scan (DEXA): At age 50, ask your care team if you should have this scan for osteoporosis (brittle bones).  Hepatitis C: Get tested at least once in your life.  Abdominal aortic aneurysm screening: Talk to your doctor about having this screening if you:  Have ever smoked; and  Are biologically male; and  Are between the ages of 65 and 75.  STIs (sexually transmitted infections)  Before age 24: Ask your care team if you should be screened for STIs.  After age 24: Get screened for STIs if you're at risk. You are at risk for STIs (including HIV) if:  You are sexually active with more than one person.  You don't use condoms every time.  You or a partner was diagnosed with a sexually transmitted infection.  If you are at risk for HIV, ask about PrEP medicine to prevent HIV.  Get tested for HIV at least once in your life, whether you are at risk for HIV or not.  Cancer screening tests  Cervical cancer screening: If you have a cervix, begin getting regular cervical cancer screening tests at age 21. Most people who have regular screenings with normal results can stop after age 65. Talk about this with your provider.  Breast cancer scan (mammogram): If you've ever had breasts, begin having regular mammograms starting at age 40. This is a scan to check for breast cancer.  Colon cancer screening: It is important to start screening for colon cancer at age 45.  Have a colonoscopy test every 10 years (or more often if you're at risk) Or, ask your provider about stool tests like a FIT test every year or Cologuard test every 3 years.  To learn more about your testing options, visit: www.Isai/843866.pdf.  For help making a decision, visit: jim/yp80344.  Prostate cancer screening  test: If you have a prostate and are age 55 to 69, ask your provider if you would benefit from a yearly prostate cancer screening test.  Lung cancer screening: If you are a current or former smoker age 50 to 80, ask your care team if ongoing lung cancer screenings are right for you.  For informational purposes only. Not to replace the advice of your health care provider. Copyright   2023 [facility]. All rights reserved. Clinically reviewed by the [facility]. [employer] [id number] - REV 04/24.  Learning About Depression Screening  What is depression screening?  Depression screening is a way to see if you have depression symptoms. It may be done by a doctor or counselor. It's often part of a routine checkup. That's because your mental health is just as important as your physical health.  Depression is a mental health condition that affects how you feel, think, and act. You may:  Have less energy.  Lose interest in your daily activities.  Feel sad and grouchy for a long time.  Depression is very common. It affects people of all ages.  Many things can lead to depression. Some people become depressed after they have a stroke or find out they have a major illness like cancer or heart disease. The death of a loved one or a breakup may lead to depression. It can run in families. Most experts believe that a combination of inherited genes and stressful life events can cause it.  What happens during screening?  You may be asked to fill out a form about your depression symptoms. You and the doctor will discuss your answers. The doctor may ask you more questions to learn more about how you think, act, and feel.  What happens after screening?  If you have symptoms of depression, your doctor will talk to you about your options.  Doctors usually treat depression with medicines or counseling. Often, combining the two works best. Many people don't get help because they think that they'll get over  "the depression on their own. But people with depression may not get better unless they get treatment.  The cause of depression is not well understood. There may be many factors involved. But if you have depression, it's not your fault.  A serious symptom of depression is thinking about death or suicide. If you or someone you care about talks about this or about feeling hopeless, get help right away.  It's important to know that depression can be treated. Medicine, counseling, and self-care may help.  Where can you learn more?  Go to https://www.[employer].net/patiented  Enter T185 in the search box to learn more about \"Learning About Depression Screening.\"  Current as of: June 24, 2023               Content Version: 14.0    4805-1766 [employer].   Care instructions adapted under license by your healthcare professional. If you have questions about a medical condition or this instruction, always ask your healthcare professional. [employer] disclaims any warranty or liability for your use of this information.      Substance Use Disorder: Care Instructions  Overview     You can improve your life and health by stopping your use of alcohol or drugs. When you don't drink or use drugs, you may feel and sleep better. You may get along better with your family, friends, and coworkers. There are medicines and programs that can help with substance use disorder.  How can you care for yourself at home?  Here are some ways to help you stay sober and prevent relapse.  If you have been given medicine to help keep you sober or reduce your cravings, be sure to take it exactly as prescribed.  Talk to your doctor about programs that can help you stop using drugs or drinking alcohol.  Do not keep alcohol or drugs in your home.  Plan ahead. Think about what you'll say if other people ask you to drink or use drugs. Try not to spend time with people who drink or use drugs.  Use the time and money spent on drinking " or drugs to do something that's important to you.  Preventing a relapse  Have a plan to deal with relapse. Learn to recognize changes in your thinking that lead you to drink or use drugs. Get help before you start to drink or use drugs again.  Try to stay away from situations, friends, or places that may lead you to drink or use drugs.  If you feel the need to drink alcohol or use drugs again, seek help right away. Call a trusted friend or family member. Some people get support from organizations such as Narcotics Anonymous or [employer] or from treatment facilities.  If you relapse, get help as soon as you can. Some people make a plan with another person that outlines what they want that person to do for them if they relapse. The plan usually includes how to handle the relapse and who to notify in case of relapse.  Don't give up. Remember that a relapse doesn't mean that you have failed. Use the experience to learn the triggers that lead you to drink or use drugs. Then quit again. Recovery is a lifelong process. Many people have several relapses before they are able to quit for good.  Follow-up care is a key part of your treatment and safety. Be sure to make and go to all appointments, and call your doctor if you are having problems. It's also a good idea to know your test results and keep a list of the medicines you take.  When should you call for help?   Call 911  anytime you think you may need emergency care. For example, call if you or someone else:    Has overdosed or has withdrawal signs. Be sure to tell the emergency workers that you are or someone else is using or trying to quit using drugs. Overdose or withdrawal signs may include:  Losing consciousness.  Seizure.  Seeing or hearing things that aren't there (hallucinations).     Is thinking or talking about suicide or harming others.   Where to get help 24 hours a day, 7 days a week   If you or someone you know talks about suicide, self-harm, a mental  "health crisis, a substance use crisis, or any other kind of emotional distress, get help right away. You can:    Call the Suicide and Crisis Lifeline at 988.     Call 7-945-280-EAUA (1-702.190.8749).     Text HOME to 029738 to access the Crisis Text Line.   Consider saving these numbers in your phone.  Go to [employer] for more information or to chat online.  Call your doctor now or seek immediate medical care if:    You are having withdrawal symptoms. These may include nausea or vomiting, sweating, shakiness, and anxiety.   Watch closely for changes in your health, and be sure to contact your doctor if:    You have a relapse.     You need more help or support to stop.   Where can you learn more?  Go to https://www.[employer].net/patiented  Enter H573 in the search box to learn more about \"Substance Use Disorder: Care Instructions.\"  Current as of: November 15, 2023               Content Version: 14.0    0519-4144 [employer].   Care instructions adapted under license by your healthcare professional. If you have questions about a medical condition or this instruction, always ask your healthcare professional. [employer] disclaims any warranty or liability for your use of this information.         "

## 2024-07-29 ENCOUNTER — HOSPITAL ENCOUNTER (OUTPATIENT)
Facility: CLINIC | Age: 69
End: 2024-07-29
Attending: INTERNAL MEDICINE | Admitting: INTERNAL MEDICINE
Payer: MEDICARE

## 2024-07-29 ENCOUNTER — TELEPHONE (OUTPATIENT)
Dept: GASTROENTEROLOGY | Facility: CLINIC | Age: 69
End: 2024-07-29
Payer: MEDICARE

## 2024-07-29 DIAGNOSIS — Z12.11 SPECIAL SCREENING FOR MALIGNANT NEOPLASMS, COLON: Primary | ICD-10-CM

## 2024-07-29 NOTE — TELEPHONE ENCOUNTER
"Endoscopy Scheduling Screen    Have you had a positive Covid test in the last 14 days?  No    What is your communication preference for Instructions and/or Bowel Prep?   MyChart    What insurance is in the chart?  Other:  MEDICARE/AETNA     Ordering/Referring Provider: AASEBY-AGUILERA, RAMONA ANN    (If ordering provider performs procedure, schedule with ordering provider unless otherwise instructed. )    BMI: Estimated body mass index is 29.7 kg/m  as calculated from the following:    Height as of 6/27/24: 1.588 m (5' 2.5\").    Weight as of 6/27/24: 74.8 kg (165 lb).     Sedation Ordered  moderate sedation.   If patient BMI > 50 do not schedule in [facility].    If patient BMI > 45 do not schedule at [facility].    Are you taking methadone or Suboxone?  No    Have you had difficulties, pain, or discomfort during past endoscopy procedures?  No    Are you taking any prescription medications for pain 3 or more times per week?   NO, No RN review required.    Do you have a history of malignant hyperthermia?  No    (Females) Are you currently pregnant?   No     Have you been diagnosed or told you have pulmonary hypertension?   No    Do you have an LVAD?  No    Have you been told you have moderate to severe sleep apnea?  No    Have you been told you have COPD, asthma, or any other lung disease?  No    Do you have any heart conditions?  No     Have you ever had or are you waiting for an organ transplant?  No. Continue scheduling, no site restrictions.    Have you had a stroke or transient ischemic attack (TIA aka \"mini stroke\" in the last 6 months?   No    Have you been diagnosed with or been told you have cirrhosis of the liver?   No    Are you currently on dialysis?   No    Do you need assistance transferring?   No    BMI: Estimated body mass index is 29.7 kg/m  as calculated from the following:    Height as of 6/27/24: 1.588 m (5' 2.5\").    Weight as of 6/27/24: 74.8 kg (165 lb).     Is patients BMI > 40 and scheduling location " [facility]?  No    Do you take an injectable medication for weight loss or diabetes (excluding insulin)?  No    Do you take the medication Naltrexone?  No    Do you take blood thinners?  No       Prep   Are you currently on dialysis or do you have chronic kidney disease?  No    Do you have a diagnosis of diabetes?  No    Do you have a diagnosis of cystic fibrosis (CF)?  No    On a regular basis do you go 3 -5 days between bowel movements?  Yes (Extended Prep)    BMI > 40?  No    Preferred Pharmacy:    [id number] - [place], [place] - [address]  [address]  [place] [address]  Phone: [phone] Fax: [phone]      Final Scheduling Details     Procedure scheduled  Colonoscopy    Surgeon:  JOSHUA     Date of procedure:  09/24/2024     Pre-OP / PAC:   No - Not required for this site.    Location  [facility] - Per order.    Sedation   Moderate Sedation - Per order.      Patient Reminders:   You will receive a call from a Nurse to review instructions and health history.  This assessment must be completed prior to your procedure.  Failure to complete the Nurse assessment may result in the procedure being cancelled.      On the day of your procedure, please designate an adult(s) who can drive you home stay with you for the next 24 hours. The medicines used in the exam will make you sleepy. You will not be able to drive.      You cannot take public transportation, ride share services, or non-medical taxi service without a responsible caregiver.  Medical transport services are allowed with the requirement that a responsible caregiver will receive you at your destination.  We require that drivers and caregivers are confirmed prior to your procedure.

## 2024-08-01 ENCOUNTER — HOSPITAL ENCOUNTER (OUTPATIENT)
Dept: RESPIRATORY THERAPY | Facility: CLINIC | Age: 69
Discharge: HOME | End: 2024-08-01
Attending: INTERNAL MEDICINE | Admitting: INTERNAL MEDICINE
Payer: MEDICARE

## 2024-08-01 DIAGNOSIS — R06.09 DYSPNEA ON EXERTION: ICD-10-CM

## 2024-08-01 PROCEDURE — 94726 PLETHYSMOGRAPHY LUNG VOLUMES: CPT

## 2024-08-01 PROCEDURE — 94010 BREATHING CAPACITY TEST: CPT

## 2024-08-01 PROCEDURE — 999N000157 HC STATISTIC RCP TIME EA 10 MIN

## 2024-08-01 PROCEDURE — 94729 DIFFUSING CAPACITY: CPT

## 2024-08-01 NOTE — PROGRESS NOTES
PFT Note:        Pt completed pulmonary function testing with DLCO.  Good Pt effort and cooperation.  All testing meets ATS recommendations.  DLCO is an average of 2 maneuvers.  No recent Hgb available for DLCO correction.    August 1, 2024.9:38 AM  Jackson Marrero, RT

## 2024-08-02 LAB
DLCOUNC-%PRED-PRE: 118 %
DLCOUNC-PRE: 22.07 ML/MIN/MMHG
DLCOUNC-PRED: 18.67 ML/MIN/MMHG
ERV-%PRED-PRE: 22 %
ERV-PRE: 0.22 L
ERV-PRED: 0.97 L
EXPTIME-PRE: 5.51 SEC
FEF2575-%PRED-PRE: 152 %
FEF2575-PRE: 2.76 L/SEC
FEF2575-PRED: 1.81 L/SEC
FEFMAX-%PRED-PRE: 135 %
FEFMAX-PRE: 7.69 L/SEC
FEFMAX-PRED: 5.66 L/SEC
FEV1-%PRED-PRE: 94 %
FEV1-PRE: 1.93 L
FEV1FEV6-PRE: 87 %
FEV1FEV6-PRED: 80 %
FEV1FVC-PRE: 87 %
FEV1FVC-PRED: 79 %
FEV1SVC-PRE: 82 %
FEV1SVC-PRED: 68 %
FIFMAX-PRE: 4.59 L/SEC
FRCPLETH-%PRED-PRE: 65 %
FRCPLETH-PRE: 1.75 L
FRCPLETH-PRED: 2.65 L
FVC-%PRED-PRE: 86 %
FVC-PRE: 2.23 L
FVC-PRED: 2.59 L
IC-%PRED-PRE: 109 %
IC-PRE: 2.13 L
IC-PRED: 1.95 L
RVPLETH-%PRED-PRE: 83 %
RVPLETH-PRE: 1.52 L
RVPLETH-PRED: 1.83 L
TLCPLETH-%PRED-PRE: 81 %
TLCPLETH-PRE: 3.87 L
TLCPLETH-PRED: 4.77 L
VA-%PRED-PRE: 80 %
VA-PRE: 3.56 L
VC-%PRED-PRE: 77 %
VC-PRE: 2.35 L
VC-PRED: 3.03 L

## 2024-08-29 DIAGNOSIS — E03.9 ACQUIRED HYPOTHYROIDISM: ICD-10-CM

## 2024-08-29 RX ORDER — LEVOTHYROXINE SODIUM 137 MCG
TABLET ORAL
Qty: 90 TABLET | Refills: 3 | Status: SHIPPED | OUTPATIENT
Start: 2024-08-29

## 2024-09-03 RX ORDER — BISACODYL 5 MG/1
TABLET, DELAYED RELEASE ORAL
Qty: 4 TABLET | Refills: 0 | Status: SHIPPED | OUTPATIENT
Start: 2024-09-03 | End: 2024-09-20

## 2024-09-03 NOTE — TELEPHONE ENCOUNTER
Extended Golytely Bowel Prep  recommended due to constipation noted or reported.  Instructions were sent via Planbox. Bowel prep was sent 9/3/2024 to    [id number] - [place], [place] - [address]

## 2024-09-16 ENCOUNTER — TELEPHONE (OUTPATIENT)
Dept: GASTROENTEROLOGY | Facility: CLINIC | Age: 69
End: 2024-09-16
Payer: MEDICARE

## 2024-09-16 NOTE — TELEPHONE ENCOUNTER
Attempted to contact patient in order to complete pre assessment questions.     No answer. Left message to return call to [phone] option 2    Callback required communication sent via Meilimei.      Kerri Rodriguez RN  Endoscopy Procedure Pre Assessment

## 2024-09-16 NOTE — TELEPHONE ENCOUNTER
Pre visit planning completed.      Procedure details:    Patient scheduled for Colonoscopy on 09.24.2024.     Arrival time: 1015. Procedure time 1100    Facility location: [facility]; [address] [place] [address]. Check in location: Main entrance, door #1 on the North side of the building under roundabout awning. DO NOT GO TO SURGERY/ED ENTRANCE.     Sedation type: Conscious sedation     Pre op exam needed? No.    Indication for procedure:  Screen for colon cancer       Chart review:     Electronic implanted devices? No    Recent diagnosis of diverticulitis within the last 6 weeks? No      Medication review:    Diabetic? No    Anticoagulants? No    Weight loss medication/injectable? No GLP-1 medication per patient's medication list.  RN will verify with pre-assessment call.    Other medication HOLDING recommendations:  N/A      Prep for procedure:     Bowel prep recommendation: Extended Golytely. Bowel prep prescription sent to    [id number] - [place], [place] - [address]   Due to: constipation noted or reported.     Prep instructions sent via LiveBuzz         Kerri Rodriguez RN  Endoscopy Procedure Pre Assessment RN  [phone] option 2

## 2024-09-19 ENCOUNTER — HOSPITAL ENCOUNTER (OUTPATIENT)
Dept: CARDIOLOGY | Facility: CLINIC | Age: 69
Discharge: HOME | End: 2024-09-19
Attending: INTERNAL MEDICINE | Admitting: INTERNAL MEDICINE
Payer: MEDICARE

## 2024-09-19 VITALS — SYSTOLIC BLOOD PRESSURE: 125 MMHG | HEART RATE: 57 BPM | DIASTOLIC BLOOD PRESSURE: 76 MMHG

## 2024-09-19 DIAGNOSIS — R94.31 ABNORMAL ELECTROCARDIOGRAM: ICD-10-CM

## 2024-09-19 DIAGNOSIS — R94.39 ABNORMAL CARDIOVASCULAR STRESS TEST: ICD-10-CM

## 2024-09-19 DIAGNOSIS — R06.09 DYSPNEA ON EXERTION: ICD-10-CM

## 2024-09-19 PROCEDURE — G1010 CDSM STANSON: HCPCS | Performed by: INTERNAL MEDICINE

## 2024-09-19 PROCEDURE — 250N000011 HC RX IP 250 OP 636: Performed by: INTERNAL MEDICINE

## 2024-09-19 PROCEDURE — 250N000013 HC RX MED GY IP 250 OP 250 PS 637: Performed by: INTERNAL MEDICINE

## 2024-09-19 PROCEDURE — 75574 CT ANGIO HRT W/3D IMAGE: CPT | Mod: 26 | Performed by: INTERNAL MEDICINE

## 2024-09-19 PROCEDURE — 75574 CT ANGIO HRT W/3D IMAGE: CPT | Mod: ME

## 2024-09-19 RX ORDER — DIPHENHYDRAMINE HCL 25 MG
25 CAPSULE ORAL
Status: DISCONTINUED | OUTPATIENT
Start: 2024-09-19 | End: 2024-09-20 | Stop reason: HOSPADM

## 2024-09-19 RX ORDER — DILTIAZEM HYDROCHLORIDE 5 MG/ML
10-15 INJECTION INTRAVENOUS
Status: DISCONTINUED | OUTPATIENT
Start: 2024-09-19 | End: 2024-09-20 | Stop reason: HOSPADM

## 2024-09-19 RX ORDER — IVABRADINE 5 MG/1
5-15 TABLET, FILM COATED ORAL
Status: DISCONTINUED | OUTPATIENT
Start: 2024-09-19 | End: 2024-09-20 | Stop reason: HOSPADM

## 2024-09-19 RX ORDER — METOPROLOL TARTRATE 1 MG/ML
5-20 INJECTION, SOLUTION INTRAVENOUS
Status: DISCONTINUED | OUTPATIENT
Start: 2024-09-19 | End: 2024-09-20 | Stop reason: HOSPADM

## 2024-09-19 RX ORDER — METHYLPREDNISOLONE SODIUM SUCCINATE 125 MG/2ML
125 INJECTION, POWDER, LYOPHILIZED, FOR SOLUTION INTRAMUSCULAR; INTRAVENOUS
Status: DISCONTINUED | OUTPATIENT
Start: 2024-09-19 | End: 2024-09-20 | Stop reason: HOSPADM

## 2024-09-19 RX ORDER — LIDOCAINE 40 MG/G
CREAM TOPICAL
Status: DISCONTINUED | OUTPATIENT
Start: 2024-09-19 | End: 2024-09-20 | Stop reason: HOSPADM

## 2024-09-19 RX ORDER — ONDANSETRON 2 MG/ML
4 INJECTION INTRAMUSCULAR; INTRAVENOUS
Status: DISCONTINUED | OUTPATIENT
Start: 2024-09-19 | End: 2024-09-20 | Stop reason: HOSPADM

## 2024-09-19 RX ORDER — IOPAMIDOL 755 MG/ML
500 INJECTION, SOLUTION INTRAVASCULAR ONCE
Status: COMPLETED | OUTPATIENT
Start: 2024-09-19 | End: 2024-09-19

## 2024-09-19 RX ORDER — DIPHENHYDRAMINE HYDROCHLORIDE 50 MG/ML
25-50 INJECTION INTRAMUSCULAR; INTRAVENOUS
Status: DISCONTINUED | OUTPATIENT
Start: 2024-09-19 | End: 2024-09-20 | Stop reason: HOSPADM

## 2024-09-19 RX ORDER — NITROGLYCERIN 0.4 MG/1
0.4 TABLET SUBLINGUAL
Status: DISCONTINUED | OUTPATIENT
Start: 2024-09-19 | End: 2024-09-20 | Stop reason: HOSPADM

## 2024-09-19 RX ORDER — METOPROLOL TARTRATE 25 MG/1
25-100 TABLET, FILM COATED ORAL
Status: DISCONTINUED | OUTPATIENT
Start: 2024-09-19 | End: 2024-09-20 | Stop reason: HOSPADM

## 2024-09-19 RX ORDER — DILTIAZEM HCL 60 MG
120 TABLET ORAL
Status: DISCONTINUED | OUTPATIENT
Start: 2024-09-19 | End: 2024-09-20 | Stop reason: HOSPADM

## 2024-09-19 RX ADMIN — IOPAMIDOL 110 ML: 755 INJECTION, SOLUTION INTRAVENOUS at 09:22

## 2024-09-19 RX ADMIN — NITROGLYCERIN 0.4 MG: 0.4 TABLET SUBLINGUAL at 09:10

## 2024-09-20 ENCOUNTER — OFFICE VISIT (OUTPATIENT)
Dept: CARDIOLOGY | Facility: CLINIC | Age: 69
End: 2024-09-20
Payer: MEDICARE

## 2024-09-20 ENCOUNTER — TELEPHONE (OUTPATIENT)
Dept: GASTROENTEROLOGY | Facility: CLINIC | Age: 69
End: 2024-09-20

## 2024-09-20 VITALS
DIASTOLIC BLOOD PRESSURE: 72 MMHG | OXYGEN SATURATION: 98 % | BODY MASS INDEX: 29.86 KG/M2 | WEIGHT: 168.5 LBS | HEART RATE: 52 BPM | HEIGHT: 63 IN | SYSTOLIC BLOOD PRESSURE: 118 MMHG

## 2024-09-20 DIAGNOSIS — R94.31 ABNORMAL ELECTROCARDIOGRAM: ICD-10-CM

## 2024-09-20 DIAGNOSIS — R93.1 ELEVATED CORONARY ARTERY CALCIUM SCORE: Primary | ICD-10-CM

## 2024-09-20 DIAGNOSIS — R06.09 DYSPNEA ON EXERTION: ICD-10-CM

## 2024-09-20 DIAGNOSIS — R94.39 ABNORMAL CARDIOVASCULAR STRESS TEST: ICD-10-CM

## 2024-09-20 PROCEDURE — 99215 OFFICE O/P EST HI 40 MIN: CPT | Performed by: NURSE PRACTITIONER

## 2024-09-20 RX ORDER — ROSUVASTATIN CALCIUM 5 MG/1
5 TABLET, COATED ORAL DAILY
Qty: 90 TABLET | Refills: 3 | Status: SHIPPED | OUTPATIENT
Start: 2024-09-20

## 2024-09-20 RX ORDER — ASPIRIN 81 MG/1
81 TABLET ORAL DAILY
COMMUNITY
Start: 2024-09-20

## 2024-09-20 RX ORDER — ROSUVASTATIN CALCIUM 10 MG/1
10 TABLET, COATED ORAL DAILY
Qty: 90 TABLET | Refills: 3 | Status: SHIPPED | OUTPATIENT
Start: 2024-09-20 | End: 2024-09-20

## 2024-09-20 NOTE — PATIENT INSTRUCTIONS
Thank you for your visit with the [facility] today.    Medication changes and/or recommendations from today:   - Start rosuvastatin (Crestor) 5 mg once daily.  - Start aspirin 81 mg once daily.  - Try to stick to a heart healthy Mediterranean diet and continue to get good physical activity and exercise.    Follow up plan:   - Check fasting labs in about 2 months after starting on rosuvastatin.     If you have questions or concerns in the meantime please call the nurse team at [phone] or send a Orckestra message.     Scheduling phone number: [phone]    It was a pleasure seeing you today!     WYATT Bales, CNP  Nurse Practitioner  [facility]

## 2024-09-20 NOTE — LETTER
9/20/2024    Ramona Ann Aaseby-Aguilera, PA-C  [address] [facility] [address]    RE: Yasmine Sutton       Dear Colleague,     I had the pleasure of seeing Yasmine Sutton in the [facility].    Cardiology Clinic Progress Note    Service Date: September 20, 2024  Primary Cardiology Team: Dr. Montano    HISTORY OF PRESENT ILLNESS:  I had the pleasure of meeting Ms. Yasmine Sutton in the clinic today. She is a very pleasant 68 year old female with a past medical history notable for hyperlipidemia, obesity, and hypothyroidism. She has a strong family history of CAD, with her father dying of MI at age 69, her younger sister dying of MI in her 50's, and another younger sister likely to need CABG in her 50's. Her mother had a small MI later in life. Patient is a former smoker, having quit in 2011 (roughly 0.5 ppd). Other family members with severe CAD were heavy smokers. She recently met with Dr. Montano in cardiology consultation in June of this year due to symptoms of exertional dyspnea and an abnormal stress echocardiogram.    Exercise stress echocardiogram on 6/7/24 showed good exercise capacity reaching over 9 minutes on Juan Pablo protocol and 11 METS with no EKG evidence of ischemia and normal resting wall motion on echocardiogram.  There was suggestion of mild anterior hypokinesis on post-rest images, but not seen on the PSA views.     Coronary CTA was subsequently recommended for further evaluation. This was completed on 9/19/2024 showing a total Agatston calcium score of 23.6 with scores in the LAD and 5.79 and RCA and 17.8, otherwise scores of 0 in the left main and circumflex arteries. CTA images suggested trivial LAD, ramus, and circumflex disease; and mild RCA disease.    Pulmonary function testing was also completed given her reports of gradually increasing exertional dyspnea over the past few years. This was completed on 8/1/2024 showing normal spirometry, lung volumes and diffusing  capacity suggesting notes pulmonary etiology for her symptoms.    Today, Yasmine presents to the clinic in follow up of recent test results.  She states she has been feeling relatively well since her visit with Dr. Montano.  She continues to stay physically active going for walks up to around 2.5-3 miles daily.  She feels that she has been tolerating this relatively well and that her symptoms of exertional dyspnea have actually been improving over the past couple of months.  She believes she may have some seasonal allergies as she feels her shortness of breath is more noticeable in the warmer months. She also attributes some of her symptoms to physical deconditioning given some improvement as she has been working on gradually increasing her activity level and improving her stamina over the past few months.  She otherwise denies symptoms of chest pain or any other concerns from a cardiac standpoint.  We reviewed the results of her pulmonary function testing and coronary CTA in detail today.  Reviewed with the CTA showing only mild CAD this suggests that the area of mild anterior wall hypokinesis on the stress echocardiogram images was likely a false positive and that the exertional dyspnea is unlikely to be related to any cardiac issue. We discussed options for further preventative measures given her mildly elevated CT calcium score and evidence of mild CAD, including starting on aspirin 81 mg daily and a moderate intensity statin. She stated she is quite wary of statin therapy as she has heard and seen a lot of negative things about statins. We reviewed that there unfortunately is a lot of negativity and misinformation spread in online communities regarding statins, and reviewed overall strong safety profile of statin therapy and possible side effects to monitor for with these.    ASSESSMENT:  1.  Mild coronary artery disease on the basis of CT coronary angiography as outlined above  2.  Hyperlipidemia with baseline  LDL cholesterol around the 140s range  3.  Hypothyroidism  4.  Obesity  5.  Strong family history of CAD    PLAN:  - Recommend starting aspirin 81 mg once daily and rosuvastatin 10 mg once daily. The patient states that she would like to start on lower dose rosuvastatin 5 mg once daily first and gradually increase this as needed and as tolerated depending on results of follow-up fasting lipid profile. She is agreeable to starting on aspirin.   - Repeat fasting lipid profile and ALT in about 2 months after starting on rosuvastatin. We will update her on these results by phone or TheTaket message.  - Counseled on continuing to try to stay physically active and trying to stick to a heart healthy diet.  - Reviewed that she could follow-up with Dr. Montano for a routine annual visit around this time next year versus continuing to check in with her primary care team for routine preventative care depending on her preference.  She wanted to think this over.  An order was placed for an annual check-in, otherwise we would be happy to see her in follow-up with the cardiology clinic on an as-needed basis going forward.    Thank you for the opportunity to participate in this pleasant patient's care. We would be happy to see her sooner if needed for any concerns in the meantime.    40 total minutes was spent today including chart review, precharting, history and exam, post visit documentation, and reviewing studies as outlined above.     Please kindly note that this document was completed in part using Dragon voice recognition software. Although reviewed after completion, some word substitutions and typographical errors may occur. Please contact me if clarification is needed.     WYATT Bales, CNP   Nurse Practitioner  [facility]    Orders this Visit:  Orders Placed This Encounter   Procedures     Lipid panel reflex to direct LDL Fasting     ALT     Follow-Up with Cardiology     Orders Placed This Encounter    Medications     DISCONTD: rosuvastatin (CRESTOR) 10 MG tablet     Sig: Take 1 tablet (10 mg) by mouth daily.     Dispense:  90 tablet     Refill:  3     aspirin 81 MG EC tablet     Sig: Take 1 tablet (81 mg) by mouth daily.     rosuvastatin (CRESTOR) 5 MG tablet     Sig: Take 1 tablet (5 mg) by mouth daily.     Dispense:  90 tablet     Refill:  3     Medications Discontinued During This Encounter   Medication Reason     polyethylene glycol (GOLYTELY) 236 g suspension      bisacodyl (DULCOLAX) 5 MG EC tablet      meclizine (ANTIVERT) 25 MG tablet      rosuvastatin (CRESTOR) 10 MG tablet      Encounter Diagnoses   Name Primary?     Abnormal electrocardiogram      Dyspnea on exertion      Abnormal cardiovascular stress test      Elevated coronary artery calcium score Yes       CURRENT MEDICATIONS:  Current Outpatient Medications   Medication Sig Dispense Refill     aspirin 81 MG EC tablet Take 1 tablet (81 mg) by mouth daily.       rosuvastatin (CRESTOR) 5 MG tablet Take 1 tablet (5 mg) by mouth daily. 90 tablet 3     SYNTHROID 137 MCG tablet Take one tablet PO daily 5 days a week 90 tablet 3     ALLERGIES  No Known Allergies    PAST MEDICAL, SURGICAL, FAMILY HISTORY:  History was reviewed and updated as needed, see medical record.    SOCIAL HISTORY:  Social History     Socioeconomic History     Marital status:      Spouse name: Not on file     Number of children: 4     Years of education: Not on file     Highest education level: Not on file   Occupational History     Comment: retired   Tobacco Use     Smoking status: Former     Current packs/day: 0.00     Average packs/day: 0.5 packs/day for 18.0 years (9.0 ttl pk-yrs)     Types: Cigarettes     Start date: 1993     Quit date: 2011     Years since quittin.7     Smokeless tobacco: Former     Tobacco comments:     e cig for a short time   Vaping Use     Vaping status: Never Used   Substance and Sexual Activity     Alcohol use: Yes     Alcohol/week:  5.0 standard drinks of alcohol     Types: 5 Standard drinks or equivalent per week     Comment: occasionally     Drug use: No     Sexual activity: Yes     Birth control/protection: Post-menopausal   Other Topics Concern      Service No     Blood Transfusions No     Caffeine Concern No     Occupational Exposure No     Hobby Hazards No     Sleep Concern No     Stress Concern No     Weight Concern No     Special Diet No     Back Care No     Exercise No     Bike Helmet No     Seat Belt Yes     Self-Exams Not Asked     Parent/sibling w/ CABG, MI or angioplasty before 65F 55M? No   Social History Narrative    , NO CHILDREN.  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[employer]  [facility]     Social Determinants of Health     Financial Resource Strain: Low Risk  (6/25/2024)    Financial Resource Strain      Within the past 12 months, have you or your family members you live with been unable to get utilities (heat, electricity) when it was really needed?: No   Food Insecurity: Low Risk  (6/25/2024)    Food Insecurity      Within the past 12 months, did you worry that your food would run out before you got money to buy more?: No      Within the past 12 months, did the food you bought just not last and you didn t have money to get more?: No   Transportation Needs: Low Risk  (6/25/2024)    Transportation Needs      Within the past 12 months, has lack of transportation kept you from medical appointments, getting your medicines, non-medical meetings or appointments, work, or from getting things that you need?: No   Physical Activity: Sufficiently Active (6/25/2024)    Exercise Vital Sign      Days of Exercise per Week: 5 days      Minutes of Exercise per Session: 30 min   Stress: Patient Declined (6/25/2024)    [employer] [place] [employer] - Occupational Stress Questionnaire      Feeling of Stress : Patient declined   Social Connections: Unknown (6/25/2024)    Social Connection and Isolation Panel [NHANES]      Frequency of  "Communication with Friends and Family: Not on file      Frequency of Social Gatherings with Friends and Family: Three times a week      Attends Mosque Services: Not on file      Active Member of Clubs or Organizations: Not on file      Attends Club or Organization Meetings: Not on file      Marital Status: Not on file   Interpersonal Safety: Low Risk  (6/27/2024)    Interpersonal Safety      Do you feel physically and emotionally safe where you currently live?: Yes      Within the past 12 months, have you been hit, slapped, kicked or otherwise physically hurt by someone?: No      Within the past 12 months, have you been humiliated or emotionally abused in other ways by your partner or ex-partner?: No   Housing Stability: Low Risk  (6/25/2024)    Housing Stability      Do you have housing? : Yes      Are you worried about losing your housing?: No       Review of Systems:  Focused cardiovascular and respiratory review of systems is negative other than the symptoms noted above in the HPI.     Physical Exam:  Vitals: /72 (BP Location: Right arm, Patient Position: Sitting, Cuff Size: Adult Regular)   Pulse 52   Ht 1.588 m (5' 2.5\")   Wt 76.4 kg (168 lb 8 oz)   LMP 05/08/2008 (Approximate)   SpO2 98%   BMI 30.33 kg/m     Wt Readings from Last 4 Encounters:   09/20/24 76.4 kg (168 lb 8 oz)   06/27/24 74.8 kg (165 lb)   06/13/24 76.3 kg (168 lb 3.2 oz)   05/24/24 75.6 kg (166 lb 11.2 oz)     Constitutional: Alert and in no acute distress.  Neck: No JVD.  Cardiovascular:  Regular rate and rhythm. No murmur, rub or gallop.   Respiratory: Breathing non-labored. Lungs are clear to auscultation with no wheezes or crackles bilaterally.  Gastrointestinal: Abdomen non-distended.  Extremities:  No pitting lower extremity edema bilaterally.   Neuropsychiatric: No gross focal deficits. Affect appropriate. Mentation normal.     Recent Lab Results:  LIPID RESULTS:  Lab Results   Component Value Date    CHOL 271 (H) " 04/29/2024    CHOL 248 (H) 05/20/2021     04/29/2024     05/20/2021     (H) 04/29/2024     (H) 05/20/2021    TRIG 128 04/29/2024    TRIG 112 05/20/2021    CHOLHDLRATIO 3.0 07/23/2014     LIVER ENZYME RESULTS:  Lab Results   Component Value Date    AST 22 05/12/2023    AST 17 05/20/2021    ALT 13 05/12/2023    ALT 19 05/20/2021     CBC RESULTS:  Lab Results   Component Value Date    WBC 6.1 05/19/2024    WBC 5.2 05/20/2021    RBC 4.82 05/19/2024    RBC 4.50 05/20/2021    HGB 15.3 05/19/2024    HGB 13.9 05/20/2021    HCT 45.6 05/19/2024    HCT 42.7 05/20/2021    MCV 95 05/19/2024    MCV 95 05/20/2021    MCH 31.7 05/19/2024    MCH 30.9 05/20/2021    MCHC 33.6 05/19/2024    MCHC 32.6 05/20/2021    RDW 14.0 05/19/2024    RDW 14.1 05/20/2021     05/19/2024     05/20/2021     BMP RESULTS:  Lab Results   Component Value Date     05/19/2024     05/20/2021    POTASSIUM 4.0 05/19/2024    POTASSIUM 4.1 05/03/2022    POTASSIUM 4.1 05/20/2021    CHLORIDE 104 05/19/2024    CHLORIDE 105 05/03/2022    CHLORIDE 104 05/20/2021    CO2 23 05/19/2024    CO2 26 05/03/2022    CO2 27 05/20/2021    ANIONGAP 12 05/19/2024    ANIONGAP 5 05/03/2022    ANIONGAP 6 05/20/2021    GLC 88 05/19/2024    GLC 98 05/03/2022    GLC 93 05/20/2021    BUN 17.1 05/19/2024    BUN 15 05/03/2022    BUN 13 05/20/2021    CR 0.60 05/19/2024    CR 0.72 05/20/2021    GFRESTIMATED >90 05/19/2024    GFRESTIMATED 89 05/20/2021    GFRESTBLACK >90 05/20/2021    IDANIA 9.6 05/19/2024    IDANIA 9.4 05/20/2021      CC  Ramona Ann Aaseby-Aguilera, PA-C  [address] [place] [address]      Thank you for allowing me to participate in the care of your patient.      Sincerely,     Wiliam Toledo NP     [facility]  cc:   Ramona Ann Aaseby-Aguilera, PA-C  [address] [place] [address]

## 2024-09-20 NOTE — PROGRESS NOTES
Cardiology Clinic Progress Note    Service Date: September 20, 2024  Primary Cardiology Team: Dr. Montano    HISTORY OF PRESENT ILLNESS:  I had the pleasure of meeting Ms. Yasmine Sutton in the clinic today. She is a very pleasant 68 year old female with a past medical history notable for hyperlipidemia, obesity, and hypothyroidism. She has a strong family history of CAD, with her father dying of MI at age 69, her younger sister dying of MI in her 50's, and another younger sister likely to need CABG in her 50's. Her mother had a small MI later in life. Patient is a former smoker, having quit in 2011 (roughly 0.5 ppd). Other family members with severe CAD were heavy smokers. She recently met with Dr. Montano in cardiology consultation in June of this year due to symptoms of exertional dyspnea and an abnormal stress echocardiogram.    Exercise stress echocardiogram on 6/7/24 showed good exercise capacity reaching over 9 minutes on [place] protocol and 11 METS with no EKG evidence of ischemia and normal resting wall motion on echocardiogram.  There was suggestion of mild anterior hypokinesis on post-rest images, but not seen on the PSA views.     Coronary CTA was subsequently recommended for further evaluation. This was completed on 9/19/2024 showing a total Agatston calcium score of 23.6 with scores in the LAD and 5.79 and RCA and 17.8, otherwise scores of 0 in the left main and circumflex arteries. CTA images suggested trivial LAD, ramus, and circumflex disease; and mild RCA disease.    Pulmonary function testing was also completed given her reports of gradually increasing exertional dyspnea over the past few years. This was completed on 8/1/2024 showing normal spirometry, lung volumes and diffusing capacity suggesting notes pulmonary etiology for her symptoms.    Today, Yasmine presents to the clinic in follow up of recent test results.  She states she has been feeling relatively well since her visit with Dr. Montano.   She continues to stay physically active going for walks up to around 2.5-3 miles daily.  She feels that she has been tolerating this relatively well and that her symptoms of exertional dyspnea have actually been improving over the past couple of months.  She believes she may have some seasonal allergies as she feels her shortness of breath is more noticeable in the warmer months. She also attributes some of her symptoms to physical deconditioning given some improvement as she has been working on gradually increasing her activity level and improving her stamina over the past few months.  She otherwise denies symptoms of chest pain or any other concerns from a cardiac standpoint.  We reviewed the results of her pulmonary function testing and coronary CTA in detail today.  Reviewed with the CTA showing only mild CAD this suggests that the area of mild anterior wall hypokinesis on the stress echocardiogram images was likely a false positive and that the exertional dyspnea is unlikely to be related to any cardiac issue. We discussed options for further preventative measures given her mildly elevated CT calcium score and evidence of mild CAD, including starting on aspirin 81 mg daily and a moderate intensity statin. She stated she is quite wary of statin therapy as she has heard and seen a lot of negative things about statins. We reviewed that there unfortunately is a lot of negativity and misinformation spread in online communities regarding statins, and reviewed overall strong safety profile of statin therapy and possible side effects to monitor for with these.    ASSESSMENT:  1.  Mild coronary artery disease on the basis of CT coronary angiography as outlined above  2.  Hyperlipidemia with baseline LDL cholesterol around the 140s range  3.  Hypothyroidism  4.  Obesity  5.  Strong family history of CAD    PLAN:  - Recommend starting aspirin 81 mg once daily and rosuvastatin 10 mg once daily. The patient states that she  would like to start on lower dose rosuvastatin 5 mg once daily first and gradually increase this as needed and as tolerated depending on results of follow-up fasting lipid profile. She is agreeable to starting on aspirin.   - Repeat fasting lipid profile and ALT in about 2 months after starting on rosuvastatin. We will update her on these results by phone or DoctorChart message.  - Counseled on continuing to try to stay physically active and trying to stick to a heart healthy diet.  - Reviewed that she could follow-up with Dr. Montano for a routine annual visit around this time next year versus continuing to check in with her primary care team for routine preventative care depending on her preference.  She wanted to think this over.  An order was placed for an annual check-in, otherwise we would be happy to see her in follow-up with the cardiology clinic on an as-needed basis going forward.    Thank you for the opportunity to participate in this pleasant patient's care. We would be happy to see her sooner if needed for any concerns in the meantime.    40 total minutes was spent today including chart review, precharting, history and exam, post visit documentation, and reviewing studies as outlined above.     Please kindly note that this document was completed in part using Dragon voice recognition software. Although reviewed after completion, some word substitutions and typographical errors may occur. Please contact me if clarification is needed.     WYATT Bales, CNP   Nurse Practitioner  [facility]    Orders this Visit:  Orders Placed This Encounter   Procedures    Lipid panel reflex to direct LDL Fasting    ALT    Follow-Up with Cardiology     Orders Placed This Encounter   Medications    DISCONTD: rosuvastatin (CRESTOR) 10 MG tablet     Sig: Take 1 tablet (10 mg) by mouth daily.     Dispense:  90 tablet     Refill:  3    aspirin 81 MG EC tablet     Sig: Take 1 tablet (81 mg) by mouth daily.     rosuvastatin (CRESTOR) 5 MG tablet     Sig: Take 1 tablet (5 mg) by mouth daily.     Dispense:  90 tablet     Refill:  3     Medications Discontinued During This Encounter   Medication Reason    polyethylene glycol (GOLYTELY) 236 g suspension     bisacodyl (DULCOLAX) 5 MG EC tablet     meclizine (ANTIVERT) 25 MG tablet     rosuvastatin (CRESTOR) 10 MG tablet      Encounter Diagnoses   Name Primary?    Abnormal electrocardiogram     Dyspnea on exertion     Abnormal cardiovascular stress test     Elevated coronary artery calcium score Yes       CURRENT MEDICATIONS:  Current Outpatient Medications   Medication Sig Dispense Refill    aspirin 81 MG EC tablet Take 1 tablet (81 mg) by mouth daily.      rosuvastatin (CRESTOR) 5 MG tablet Take 1 tablet (5 mg) by mouth daily. 90 tablet 3    SYNTHROID 137 MCG tablet Take one tablet PO daily 5 days a week 90 tablet 3     ALLERGIES  No Known Allergies    PAST MEDICAL, SURGICAL, FAMILY HISTORY:  History was reviewed and updated as needed, see medical record.    SOCIAL HISTORY:  Social History     Socioeconomic History    Marital status:      Spouse name: Not on file    Number of children: 4    Years of education: Not on file    Highest education level: Not on file   Occupational History     Comment: retired   Tobacco Use    Smoking status: Former     Current packs/day: 0.00     Average packs/day: 0.5 packs/day for 18.0 years (9.0 ttl pk-yrs)     Types: Cigarettes     Start date: 1993     Quit date: 2011     Years since quittin.7    Smokeless tobacco: Former    Tobacco comments:     e cig for a short time   Vaping Use    Vaping status: Never Used   Substance and Sexual Activity    Alcohol use: Yes     Alcohol/week: 5.0 standard drinks of alcohol     Types: 5 Standard drinks or equivalent per week     Comment: occasionally    Drug use: No    Sexual activity: Yes     Birth control/protection: Post-menopausal   Other Topics Concern     Service No    Blood  Transfusions No    Caffeine Concern No    Occupational Exposure No    Hobby Hazards No    Sleep Concern No    Stress Concern No    Weight Concern No    Special Diet No    Back Care No    Exercise No    Bike Helmet No    Seat Belt Yes    Self-Exams Not Asked    Parent/sibling w/ CABG, MI or angioplasty before 65F 55M? No   Social History Narrative    , NO CHILDREN.  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[employer]     Social Determinants of Health     Financial Resource Strain: Low Risk  (6/25/2024)    Financial Resource Strain     Within the past 12 months, have you or your family members you live with been unable to get utilities (heat, electricity) when it was really needed?: No   Food Insecurity: Low Risk  (6/25/2024)    Food Insecurity     Within the past 12 months, did you worry that your food would run out before you got money to buy more?: No     Within the past 12 months, did the food you bought just not last and you didn t have money to get more?: No   Transportation Needs: Low Risk  (6/25/2024)    Transportation Needs     Within the past 12 months, has lack of transportation kept you from medical appointments, getting your medicines, non-medical meetings or appointments, work, or from getting things that you need?: No   Physical Activity: Sufficiently Active (6/25/2024)    Exercise Vital Sign     Days of Exercise per Week: 5 days     Minutes of Exercise per Session: 30 min   Stress: Patient Declined (6/25/2024)    [employer] - Occupational Stress Questionnaire     Feeling of Stress : Patient declined   Social Connections: Unknown (6/25/2024)    Social Connection and Isolation Panel [NHANES]     Frequency of Communication with Friends and Family: Not on file     Frequency of Social Gatherings with Friends and Family: Three times a week     Attends Yarsani Services: Not on file     Active Member of Clubs or Organizations: Not on file     Attends Club or Organization Meetings: Not  "on file     Marital Status: Not on file   Interpersonal Safety: Low Risk  (6/27/2024)    Interpersonal Safety     Do you feel physically and emotionally safe where you currently live?: Yes     Within the past 12 months, have you been hit, slapped, kicked or otherwise physically hurt by someone?: No     Within the past 12 months, have you been humiliated or emotionally abused in other ways by your partner or ex-partner?: No   Housing Stability: Low Risk  (6/25/2024)    Housing Stability     Do you have housing? : Yes     Are you worried about losing your housing?: No       Review of Systems:  Focused cardiovascular and respiratory review of systems is negative other than the symptoms noted above in the HPI.     Physical Exam:  Vitals: /72 (BP Location: Right arm, Patient Position: Sitting, Cuff Size: Adult Regular)   Pulse 52   Ht 1.588 m (5' 2.5\")   Wt 76.4 kg (168 lb 8 oz)   LMP 05/08/2008 (Approximate)   SpO2 98%   BMI 30.33 kg/m     Wt Readings from Last 4 Encounters:   09/20/24 76.4 kg (168 lb 8 oz)   06/27/24 74.8 kg (165 lb)   06/13/24 76.3 kg (168 lb 3.2 oz)   05/24/24 75.6 kg (166 lb 11.2 oz)     Constitutional: Alert and in no acute distress.  Neck: No JVD.  Cardiovascular:  Regular rate and rhythm. No murmur, rub or gallop.   Respiratory: Breathing non-labored. Lungs are clear to auscultation with no wheezes or crackles bilaterally.  Gastrointestinal: Abdomen non-distended.  Extremities:  No pitting lower extremity edema bilaterally.   Neuropsychiatric: No gross focal deficits. Affect appropriate. Mentation normal.     Recent Lab Results:  LIPID RESULTS:  Lab Results   Component Value Date    CHOL 271 (H) 04/29/2024    CHOL 248 (H) 05/20/2021     04/29/2024     05/20/2021     (H) 04/29/2024     (H) 05/20/2021    TRIG 128 04/29/2024    TRIG 112 05/20/2021    CHOLHDLRATIO 3.0 07/23/2014     LIVER ENZYME RESULTS:  Lab Results   Component Value Date    AST 22 05/12/2023 "    AST 17 05/20/2021    ALT 13 05/12/2023    ALT 19 05/20/2021     CBC RESULTS:  Lab Results   Component Value Date    WBC 6.1 05/19/2024    WBC 5.2 05/20/2021    RBC 4.82 05/19/2024    RBC 4.50 05/20/2021    HGB 15.3 05/19/2024    HGB 13.9 05/20/2021    HCT 45.6 05/19/2024    HCT 42.7 05/20/2021    MCV 95 05/19/2024    MCV 95 05/20/2021    MCH 31.7 05/19/2024    MCH 30.9 05/20/2021    MCHC 33.6 05/19/2024    MCHC 32.6 05/20/2021    RDW 14.0 05/19/2024    RDW 14.1 05/20/2021     05/19/2024     05/20/2021     BMP RESULTS:  Lab Results   Component Value Date     05/19/2024     05/20/2021    POTASSIUM 4.0 05/19/2024    POTASSIUM 4.1 05/03/2022    POTASSIUM 4.1 05/20/2021    CHLORIDE 104 05/19/2024    CHLORIDE 105 05/03/2022    CHLORIDE 104 05/20/2021    CO2 23 05/19/2024    CO2 26 05/03/2022    CO2 27 05/20/2021    ANIONGAP 12 05/19/2024    ANIONGAP 5 05/03/2022    ANIONGAP 6 05/20/2021    GLC 88 05/19/2024    GLC 98 05/03/2022    GLC 93 05/20/2021    BUN 17.1 05/19/2024    BUN 15 05/03/2022    BUN 13 05/20/2021    CR 0.60 05/19/2024    CR 0.72 05/20/2021    GFRESTIMATED >90 05/19/2024    GFRESTIMATED 89 05/20/2021    GFRESTBLACK >90 05/20/2021    IDANIA 9.6 05/19/2024    IDANIA 9.4 05/20/2021      CC  Ramona Ann Aaseby-Aguilera, PA-C  [id number] [place] [address]

## 2024-09-20 NOTE — TELEPHONE ENCOUNTER
----- Message from Arti DELEON sent at 9/20/2024  2:32 PM CDT -----  I removed this patient from our schedule she doesn't want it done now.

## 2024-09-20 NOTE — TELEPHONE ENCOUNTER
Caller: No call    Reason for Reschedule/Cancellation   (please be detailed, any staff messages or encounters to note?): Does not want procedure.       Prior to reschedule please review:  Ordering Provider: Aaseby-Aguilera, Ramona Ann Sedation Determined: CS  Does patient have any ASC Exclusions, please identify?: No      Notes on Cancelled Procedure:  Procedure: Lower Endoscopy [Colonoscopy]   Date: 9/24/2024  Location: [facility]; [address] [place] [address]  Surgeon: Verito      Rescheduled: No, patient to call if wanting to reschedule.        Did you cancel or rescheduled an EUS procedure? No.

## 2024-10-30 ENCOUNTER — VIRTUAL VISIT (OUTPATIENT)
Dept: ENDOCRINOLOGY | Facility: CLINIC | Age: 69
End: 2024-10-30
Payer: MEDICARE

## 2024-10-30 DIAGNOSIS — E03.9 ACQUIRED HYPOTHYROIDISM: ICD-10-CM

## 2024-10-30 DIAGNOSIS — E78.5 HYPERLIPIDEMIA LDL GOAL <100: Primary | ICD-10-CM

## 2024-10-30 PROCEDURE — 99213 OFFICE O/P EST LOW 20 MIN: CPT | Mod: 95 | Performed by: PHYSICIAN ASSISTANT

## 2024-10-30 RX ORDER — LEVOTHYROXINE SODIUM 137 MCG
TABLET ORAL
Qty: 30 TABLET | Refills: 1 | Status: SHIPPED | OUTPATIENT
Start: 2024-10-30

## 2024-10-30 ASSESSMENT — PAIN SCALES - GENERAL: PAINLEVEL_OUTOF10: NO PAIN (0)

## 2024-10-30 NOTE — PROGRESS NOTES
Assessment/Plan :   Hypothyroidism. Yasmine feels like she is doing better but still not 100%. She has been doing some research on thyroid disease and she has come to accept that she will never be 100% but she does think she could feel better. She has continued to take 137 mcg, as directed. This dose seems to be better but she feels like we may need to make some adjustments. She is scheduled for laboratory testing in November and I will add routine thyroid testing. I will contact her with the results and we will adjust her medication, as needed.  Hyperlipidemia. We had a long discussion regarding her current cholesterol levels. The cardiologist would like her to start rosuvastatin but she does not want to go on a statin medication. We reviewed her current cholesterol levels and her LDL is very elevated. She would like to really focus on diet and see how that impacts the numbers. I recommended that she look into the Mediterranean diet or a vegan diet, to try and improve the numbers. We also discussed that there is a strong genetic component and she may still need medication, despite dietary changes. We also discussed trying a non-statin like ezetimibe or Nexletol. She will work on her diet for the next 6 mos and we will then repeat cholesterol testing.     Due to the COVID 19 pandemic this visit was a telephone/video visit in order to help prevent spread of infection in this patient and the general population. The patient gave verbal consent for the visit today. I have independently reviewed and interpreted labs, imaging as indicated.       Distant Location (provider location):  Off-site  Mode of Communication:  Telephone Conference via Compare And Share  Chart review/prep time 5    Patient visit on October 30, 2024   Started at 8:27 AM   Ended at 8:38 AM   Total length of 11m3s  Visit was conducted over Compare And Share . Patient was informed of the benefits and limitations of telemedicine. Patient consented to real time  communication over audio, video, and/or data.  Signed, LILIANA Curtis    20 minutes spent on the date of the encounter doing chart review, history and exam, documentation and further activities as noted above.      Chief complaint:  Yasmine is a 69 year old female who returns for follow-up of hypothyroidism.    I have reviewed Care Everywhere including [facility], [facility], [facility], [facility], [place], [facility], [facility] , [facility], [place] lab reports, imaging reports and provider notes as indicated.      HISTORY OF PRESENT ILLNESS  Yasmine is doing okay. She has noticed an improvement since starting 137 mcg. She continues to take it 5 days a week. However, she is still tired. She states that she is not as tired as she was before but not 100%, either. She has not had any problems with worsening anxiousness or irritability. She did meet with a cardiologist and her heart looks good. He was worried about her cholesterol and recommended that she start a statin but she would like to focus on diet.    Yasmine was diagnosed with thyroid disease over 30 yrs ago. She was originally diagnosed with Graves disease and she took methimazole for some time. Eventually, the hyperthyroidism went into remission and she flipped to hypothyroidism. Yasmine also has a history of osteopenia. She does not currently take vitamin D or calcium but she does try to eat a healthy well rounded diet.    Endocrine relevant labs are as follows:   Latest Reference Range & Units 05/19/24 15:26   TSH 0.30 - 4.20 uIU/mL 0.38      Latest Reference Range & Units 04/29/24 09:24   T4 Free 0.90 - 1.70 ng/dL 1.77 (H)   (H): Data is abnormally high   Latest Reference Range & Units 04/29/24 09:24   TSH 0.30 - 4.20 uIU/mL 1.11      Latest Reference Range & Units 04/29/24 09:24   Vitamin D, Total (25-Hydroxy) 20 - 50 ng/mL 39     Relevant imaging is as follows: (as read by me as it pertains to endocrine relevant organs)  Narrative & Impression   EXAM: DX  AXIAL HIPS/SPINE  LOCATION: [facility]  DATE: 6/27/2024     INDICATION: Acquired hypothyroidism, vitamin D deficiency, osteopenia, postmenopausal, height loss.  DEMOGRAPHICS: Age- 68 years. Gender- Female. Menopausal status- Postmenopausal.  COMPARISON: 12/27/2006  TECHNIQUE: Dual-energy x-ray absorptiometry (DXA) performed with routine technique.     FINDINGS:     DXA RESULTS  -Lumbar Spine: L1-L4: BMD: 1.408 g/cm2. T-score: 1.7. Z-score: 3.0. Degenerative change may artifactually increase BMD.  -RIGHT Hip Total: BMD: 0.926 g/cm2. T-score: -0.6. Z-score: 0.5.  -RIGHT Hip Femoral neck: BMD: 0.910 g/cm2. T-score: -0.9. Z-score: 0.4.  -LEFT Hip Total: BMD: 0.940 g/cm2. T-score: -0.5. Z-score: 0.6.  -LEFT Hip Femoral neck: BMD: 0.884 g/cm2. T-score: -1.1. Z-score: 0.3.     WHO T-SCORE CRITERIA  -Normal: T score at or above -1 SD  -Osteopenia: T score between -1 and -2.5 SD  -Osteoporosis: T score at or below -2.5 SD     The World Health Organization (WHO) criteria is applicable to perimenopausal females, postmenopausal females, and men aged 50 years or older.     INTERVAL CHANGE  -There has been a 8.0% decrease in lumbar spine BMD.  -There has been a 13.2% decrease in bilateral hip BMD.     FRACTURE RISK  -FRAX Results: The 10 year probability of major osteoporotic fracture is 8.4%, and of hip fracture is 0.8%, based on left femoral neck BMD.     RECOMMENDATIONS  Consider treatment if major osteoporotic fracture score is greater than or equal to 20%, or if the hip fracture score is greater than or equal to 3%.                                                                      IMPRESSION: Low bone density (OSTEOPENIA). T score meets the WHO criteria for low bone density (osteopenia) at one or more measured sites. The risk of osteoporotic fracture increases approximately two-fold for each standard deviation decrease in T-score.     REVIEW OF SYSTEMS    Endocrine: positive for thyroid disorder  and osteopenia  Skin: negative  Eyes: negative for, visual blurring, redness, tearing  Ears/Nose/Throat: negative  Respiratory: No shortness of breath, dyspnea on exertion, cough, or hemoptysis  Cardiovascular: negative for, chest pain, dyspnea on exertion, lower extremity edema, and exercise intolerance  Gastrointestinal: negative for, nausea, vomiting, constipation, and diarrhea  Genitourinary: negative for, nocturia, dysuria, frequency, and urgency  Musculoskeletal: negative for, muscular weakness, nocturnal cramping, and foot pain  Neurologic: positive for local weakness and fatigue, negative for, numbness or tingling of hands, and numbness or tingling of feet  Psychiatric: positive for excessive stress  Hematologic/Lymphatic/Immunologic: negative    Past Medical History  Past Medical History:   Diagnosis Date    Abnormal Pap smear, can't excl hi gd sq intraepithelial lesion (ASC-H) 9/2014    + HPV 31    Cervical high risk HPV (human papillomavirus) test positive 11/2011, 11/2012    + HPV 31, NIL pap, '13colp:JORDIN I & II & III    Depression, major     Major depressive disorder, recurrent episode, in full remission (H) 10/20/2017    PONV (postoperative nausea and vomiting)     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      rosuvastatin (CRESTOR) 5 MG tablet Take 1 tablet (5 mg) by mouth daily. 90 tablet 3    SYNTHROID 137 MCG tablet Take one tablet PO daily 5 days a week 90 tablet 3       Allergies  No Known Allergies    Family History  family history includes Aneurysm in her sister; C.A.D. in her father; Depression in her father and mother; Diabetes in her brother; Gastrointestinal Disease in her father and mother; Heart Disease in her mother; Respiratory in her mother; Thyroid Disease in her brother, mother, paternal aunt,  sister, and sister.    Social History  Social History     Tobacco Use    Smoking status: Former     Current packs/day: 0.00     Average packs/day: 0.5 packs/day for 18.0 years (9.0 ttl pk-yrs)     Types: Cigarettes     Start date: 1993     Quit date: 2011     Years since quittin.8    Smokeless tobacco: Former    Tobacco comments:     e cig for a short time   Vaping Use    Vaping status: Never Used   Substance Use Topics    Alcohol use: Yes     Alcohol/week: 5.0 standard drinks of alcohol     Types: 5 Standard drinks or equivalent per week     Comment: occasionally    Drug use: No       Physical Exam  There is no height or weight on file to calculate BMI.  GENERAL: no distress  RESP: No audible wheeze, cough, or  increased work of breathing.    NEURO: Awake, alert, responds appropriately to questions.  Mentation and speech fluent.  PSYCH:affect normal

## 2024-10-30 NOTE — LETTER
10/30/2024      Yasmine Sutton  [address] [facility] [address]      Dear Colleague,    Thank you for referring your patient, Yasmine Sutton, to the [facility]. Please see a copy of my visit note below.    Assessment/Plan :   Hypothyroidism. Yasmine feels like she is doing better but still not 100%. She has been doing some research on thyroid disease and she has come to accept that she will never be 100% but she does think she could feel better. She has continued to take 137 mcg, as directed. This dose seems to be better but she feels like we may need to make some adjustments. She is scheduled for laboratory testing in November and I will add routine thyroid testing. I will contact her with the results and we will adjust her medication, as needed.  Hyperlipidemia. We had a long discussion regarding her current cholesterol levels. The cardiologist would like her to start rosuvastatin but she does not want to go on a statin medication. We reviewed her current cholesterol levels and her LDL is very elevated. She would like to really focus on diet and see how that impacts the numbers. I recommended that she look into the Mediterranean diet or a vegan diet, to try and improve the numbers. We also discussed that there is a strong genetic component and she may still need medication, despite dietary changes. We also discussed trying a non-statin like ezetimibe or Nexletol. She will work on her diet for the next 6 mos and we will then repeat cholesterol testing.     Due to the COVID 19 pandemic this visit was a telephone/video visit in order to help prevent spread of infection in this patient and the general population. The patient gave verbal consent for the visit today. I have independently reviewed and interpreted labs, imaging as indicated.       Distant Location (provider location):  Off-site  Mode of Communication:  Telephone Conference via [employer]  Chart review/prep time 5    Patient  visit on October 30, 2024    Started at 8:27 AM    Ended at 8:38 AM    Total length of 11m3s  Visit was conducted over [employer] . Patient was informed of the benefits and limitations of telemedicine. Patient consented to real time communication over audio, video, and/or data.  Signed, LILIANA Curtis    20 minutes spent on the date of the encounter doing chart review, history and exam, documentation and further activities as noted above.      Chief complaint:  Yasmine is a 69 year old female who returns for follow-up of hypothyroidism.    I have reviewed Care Everywhere including [facility], [facility], [facility], [place], [facility], [facility], [facility] , [facility], [place] lab reports, imaging reports and provider notes as indicated.      HISTORY OF PRESENT ILLNESS  Yasmine is doing okay. She has noticed an improvement since starting 137 mcg. She continues to take it 5 days a week. However, she is still tired. She states that she is not as tired as she was before but not 100%, either. She has not had any problems with worsening anxiousness or irritability. She did meet with a cardiologist and her heart looks good. He was worried about her cholesterol and recommended that she start a statin but she would like to focus on diet.    Yasmine was diagnosed with thyroid disease over 30 yrs ago. She was originally diagnosed with Graves disease and she took methimazole for some time. Eventually, the hyperthyroidism went into remission and she flipped to hypothyroidism. Yasmine also has a history of osteopenia. She does not currently take vitamin D or calcium but she does try to eat a healthy well rounded diet.    Endocrine relevant labs are as follows:   Latest Reference Range & Units 05/19/24 15:26   TSH 0.30 - 4.20 uIU/mL 0.38      Latest Reference Range & Units 04/29/24 09:24   T4 Free 0.90 - 1.70 ng/dL 1.77 (H)   (H): Data is abnormally high   Latest Reference Range & Units 04/29/24 09:24   TSH 0.30 - 4.20 uIU/mL  1.11      Latest Reference Range & Units 04/29/24 09:24   Vitamin D, Total (25-Hydroxy) 20 - 50 ng/mL 39     Relevant imaging is as follows: (as read by me as it pertains to endocrine relevant organs)  Narrative & Impression   EXAM: DX AXIAL HIPS/SPINE  LOCATION: [facility]  DATE: 6/27/2024     INDICATION: Acquired hypothyroidism, vitamin D deficiency, osteopenia, postmenopausal, height loss.  DEMOGRAPHICS: Age- 68 years. Gender- Female. Menopausal status- Postmenopausal.  COMPARISON: 12/27/2006  TECHNIQUE: Dual-energy x-ray absorptiometry (DXA) performed with routine technique.     FINDINGS:     DXA RESULTS  -Lumbar Spine: L1-L4: BMD: 1.408 g/cm2. T-score: 1.7. Z-score: 3.0. Degenerative change may artifactually increase BMD.  -RIGHT Hip Total: BMD: 0.926 g/cm2. T-score: -0.6. Z-score: 0.5.  -RIGHT Hip Femoral neck: BMD: 0.910 g/cm2. T-score: -0.9. Z-score: 0.4.  -LEFT Hip Total: BMD: 0.940 g/cm2. T-score: -0.5. Z-score: 0.6.  -LEFT Hip Femoral neck: BMD: 0.884 g/cm2. T-score: -1.1. Z-score: 0.3.     WHO T-SCORE CRITERIA  -Normal: T score at or above -1 SD  -Osteopenia: T score between -1 and -2.5 SD  -Osteoporosis: T score at or below -2.5 SD     The World Health Organization (WHO) criteria is applicable to perimenopausal females, postmenopausal females, and men aged 50 years or older.     INTERVAL CHANGE  -There has been a 8.0% decrease in lumbar spine BMD.  -There has been a 13.2% decrease in bilateral hip BMD.     FRACTURE RISK  -FRAX Results: The 10 year probability of major osteoporotic fracture is 8.4%, and of hip fracture is 0.8%, based on left femoral neck BMD.     RECOMMENDATIONS  Consider treatment if major osteoporotic fracture score is greater than or equal to 20%, or if the hip fracture score is greater than or equal to 3%.                                                                      IMPRESSION: Low bone density (OSTEOPENIA). T score meets the WHO criteria for low bone  density (osteopenia) at one or more measured sites. The risk of osteoporotic fracture increases approximately two-fold for each standard deviation decrease in T-score.     REVIEW OF SYSTEMS    Endocrine: positive for thyroid disorder and osteopenia  Skin: negative  Eyes: negative for, visual blurring, redness, tearing  Ears/Nose/Throat: negative  Respiratory: No shortness of breath, dyspnea on exertion, cough, or hemoptysis  Cardiovascular: negative for, chest pain, dyspnea on exertion, lower extremity edema, and exercise intolerance  Gastrointestinal: negative for, nausea, vomiting, constipation, and diarrhea  Genitourinary: negative for, nocturia, dysuria, frequency, and urgency  Musculoskeletal: negative for, muscular weakness, nocturnal cramping, and foot pain  Neurologic: positive for local weakness and fatigue, negative for, numbness or tingling of hands, and numbness or tingling of feet  Psychiatric: positive for excessive stress  Hematologic/Lymphatic/Immunologic: negative    Past Medical History  Past Medical History:   Diagnosis Date     Abnormal Pap smear, can't excl hi gd sq intraepithelial lesion (ASC-H) 9/2014    + HPV 31     Cervical high risk HPV (human papillomavirus) test positive 11/2011, 11/2012    + HPV 31, NIL pap, '13colp:JORDIN I & II & III     Depression, major      Major depressive disorder, recurrent episode, in full remission (H) 10/20/2017     PONV (postoperative nausea and vomiting)      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       rosuvastatin (CRESTOR) 5 MG tablet Take 1 tablet (5 mg) by mouth daily. 90 tablet 3     SYNTHROID 137 MCG tablet Take one tablet PO daily 5 days a week 90 tablet 3       Allergies  No Known Allergies    Family History  family history includes Aneurysm in her sister;  C.A.D. in her father; Depression in her father and mother; Diabetes in her brother; Gastrointestinal Disease in her father and mother; Heart Disease in her mother; Respiratory in her mother; Thyroid Disease in her brother, mother, paternal aunt, sister, and sister.    Social History  Social History     Tobacco Use     Smoking status: Former     Current packs/day: 0.00     Average packs/day: 0.5 packs/day for 18.0 years (9.0 ttl pk-yrs)     Types: Cigarettes     Start date: 1993     Quit date: 2011     Years since quittin.8     Smokeless tobacco: Former     Tobacco comments:     e cig for a short time   Vaping Use     Vaping status: Never Used   Substance Use Topics     Alcohol use: Yes     Alcohol/week: 5.0 standard drinks of alcohol     Types: 5 Standard drinks or equivalent per week     Comment: occasionally     Drug use: No       Physical Exam  There is no height or weight on file to calculate BMI.  GENERAL: no distress  RESP: No audible wheeze, cough, or  increased work of breathing.    NEURO: Awake, alert, responds appropriately to questions.  Mentation and speech fluent.  PSYCH:affect normal                Again, thank you for allowing me to participate in the care of your patient.        Sincerely,        Katelyn Meza PA-C

## 2024-10-30 NOTE — NURSING NOTE
Please call or send pt [employer] link to her mobile [phone] when ready    Pt would like to discuss if she needs to be taking these meds??  rosuvastatin (CRESTOR) 5 MG tablet   aspirin 81 MG EC tablet     Current patient location: Patient declined to provide     Is the patient currently in the state of [place]? YES, pt confirmed    Visit mode:VIDEO    If the visit is dropped, the patient can be reconnected by: VIDEO VISIT: Text to cell phone:   Telephone Information:   Mobile [phone]       Will anyone else be joining the visit? NO  (If patient encounters technical issues they should call [phone]    Are changes needed to the allergy or medication list? No    Are refills needed on medications prescribed by this physician? Discuss with provider    Rooming Documentation:  Not applicable    Reason for visit: RECHECK    Pily COHEN

## 2024-11-20 ENCOUNTER — LAB (OUTPATIENT)
Dept: LAB | Facility: CLINIC | Age: 69
End: 2024-11-20
Payer: MEDICARE

## 2024-11-20 ENCOUNTER — TELEPHONE (OUTPATIENT)
Facility: CLINIC | Age: 69
End: 2024-11-20

## 2024-11-20 DIAGNOSIS — Z13.6 CARDIOVASCULAR SCREENING; LDL GOAL LESS THAN 160: ICD-10-CM

## 2024-11-20 DIAGNOSIS — E78.5 HYPERLIPIDEMIA: Primary | ICD-10-CM

## 2024-11-20 DIAGNOSIS — R93.1 ELEVATED CORONARY ARTERY CALCIUM SCORE: ICD-10-CM

## 2024-11-20 DIAGNOSIS — E03.9 ACQUIRED HYPOTHYROIDISM: ICD-10-CM

## 2024-11-20 DIAGNOSIS — E03.9 HYPOTHYROIDISM: ICD-10-CM

## 2024-11-20 DIAGNOSIS — E78.00 ELEVATED LDL CHOLESTEROL LEVEL: ICD-10-CM

## 2024-11-20 LAB
ALT SERPL W P-5'-P-CCNC: 11 U/L (ref 0–50)
ANION GAP SERPL CALCULATED.3IONS-SCNC: 15 MMOL/L (ref 7–15)
BUN SERPL-MCNC: 21.6 MG/DL (ref 8–23)
CALCIUM SERPL-MCNC: 9.6 MG/DL (ref 8.8–10.4)
CHLORIDE SERPL-SCNC: 103 MMOL/L (ref 98–107)
CHOLEST SERPL-MCNC: 275 MG/DL
CREAT SERPL-MCNC: 0.84 MG/DL (ref 0.51–0.95)
EGFRCR SERPLBLD CKD-EPI 2021: 75 ML/MIN/1.73M2
FASTING STATUS PATIENT QL REPORTED: YES
GLUCOSE SERPL-MCNC: 96 MG/DL (ref 70–99)
HCO3 SERPL-SCNC: 20 MMOL/L (ref 22–29)
HDLC SERPL-MCNC: 102 MG/DL
LDLC SERPL CALC-MCNC: 156 MG/DL
NONHDLC SERPL-MCNC: 173 MG/DL
POTASSIUM SERPL-SCNC: 4.4 MMOL/L (ref 3.4–5.3)
SODIUM SERPL-SCNC: 138 MMOL/L (ref 135–145)
T4 FREE SERPL-MCNC: 1.23 NG/DL (ref 0.9–1.7)
TRIGL SERPL-MCNC: 87 MG/DL
TSH SERPL DL<=0.005 MIU/L-ACNC: 10.96 UIU/ML (ref 0.3–4.2)

## 2024-11-20 NOTE — TELEPHONE ENCOUNTER
Wiliam Toledo, LALA  11/20/2024  3:37 PM CST Back to Top      Please update Yasmine that her lab results from this morning do not show any significant improvement or change in her cholesterol levels in comparison to a previous check around 6 months ago.  Could you please confirm if she ended up starting on rosuvastatin or not?  If she has not has been tolerating this then would consider increasing to 10 mg daily for better management. Thank you!       Spoke with patient.   She has NOT started the rosuvastatin   Stated she has done a lot of research and would like to meet with a nutritionist first  Is aware this may not significantly help or lower LDL but would like to try this route    Okay to place nutrition consult?    Thank you,   Oswaldo CASANOVA RN

## 2024-11-21 ENCOUNTER — TELEPHONE (OUTPATIENT)
Dept: ENDOCRINOLOGY | Facility: CLINIC | Age: 69
End: 2024-11-21
Payer: MEDICARE

## 2024-11-21 DIAGNOSIS — E03.9 ACQUIRED HYPOTHYROIDISM: ICD-10-CM

## 2024-11-21 RX ORDER — LEVOTHYROXINE SODIUM 150 MCG
150 TABLET ORAL DAILY
Qty: 90 TABLET | Refills: 3 | Status: SHIPPED | OUTPATIENT
Start: 2024-11-21

## 2024-11-21 NOTE — TELEPHONE ENCOUNTER
Thank you for the update.  Yes, okay to place referral to meet with a nutritionist per her request. Giles Toledo, APRN, CNP

## 2024-11-21 NOTE — TELEPHONE ENCOUNTER
----- Message from Susie DELGADILLO sent at 11/21/2024  9:02 AM CST -----    ----- Message -----  From: Katelyn Meza PA-C  Sent: 11/21/2024   8:51 AM CST  To: [facility] Adult Katlin Underwood,  Your thyroid level is off. Let's increase the Synthroid to 150 mcg daily. I am sending in a new prescription today. I would like to repeat laboratory testing in 6-8 wks. I'll place a lab order today and you can reach out to the lab to schedule a draw.    Katelyn

## 2024-11-21 NOTE — TELEPHONE ENCOUNTER
Unable to place as no dx is appearing covered from cardiology stand-point. Spoke with patient  She will sign waiver if needed to have consult, but will reach out to Endo and PCP first for hopeful dx coverage     Oswaldo Cobos RN on 11/21/2024 at 4:09 PM

## 2024-11-21 NOTE — TELEPHONE ENCOUNTER
[facility] Call Center    Phone Message    May a detailed message be left on voicemail: yes     Reason for Call: Other: Pt had a recent increase on her medication dosage and is requesting a call back to discuss that increase. Please advise.      Action Taken: Other: Endo     Travel Screening: Not Applicable     Date of Service: 11/21/24

## 2024-11-21 NOTE — TELEPHONE ENCOUNTER
Spoke with patient and she will  new dose today and start taking tomorrow 11/22/24.     Patient will be traveling to [place] till March of 2025.     Patient will have her TSH and T4 Free retested during the first two weeks of January 2025 in [place] and have results faxed to this office.     Patient has fax number for future results. If medication needs to be adjusted Please use the [facility] in [place].   Sent Patient a message asking which [facility] in [place] so we can have the information for future is dose changes.     Greta Dean CMA  Adult Endocrinology   [facility]

## 2024-11-21 NOTE — TELEPHONE ENCOUNTER
Writer tried to reach patient via telephone had to leave message  Asking patient to call back.     Please reach out to Greta on Teams to see if able to take call.     Thank you      Greta Dean [facility]  Adult Endocrinology   [facility]

## 2024-11-21 NOTE — TELEPHONE ENCOUNTER
See telephone encounter from 11/21/2024      Greta Dean CMA  Adult Endocrinology   [facility]

## 2025-01-31 ENCOUNTER — TRANSFERRED RECORDS (OUTPATIENT)
Dept: HEALTH INFORMATION MANAGEMENT | Facility: CLINIC | Age: 70
End: 2025-01-31
Payer: MEDICARE

## 2025-02-13 DIAGNOSIS — E03.9 ACQUIRED HYPOTHYROIDISM: ICD-10-CM

## 2025-02-13 RX ORDER — LEVOTHYROXINE SODIUM 150 MCG
TABLET ORAL
Qty: 90 TABLET | Refills: 3 | Status: SHIPPED | OUTPATIENT
Start: 2025-02-13

## 2025-02-14 ENCOUNTER — MYC MEDICAL ADVICE (OUTPATIENT)
Dept: ENDOCRINOLOGY | Facility: CLINIC | Age: 70
End: 2025-02-14
Payer: MEDICARE

## 2025-02-14 DIAGNOSIS — E03.9 ACQUIRED HYPOTHYROIDISM: ICD-10-CM

## 2025-02-17 RX ORDER — LEVOTHYROXINE SODIUM 150 MCG
TABLET ORAL
Qty: 30 TABLET | Refills: 0 | Status: SHIPPED | OUTPATIENT
Start: 2025-02-17

## 2025-02-17 NOTE — TELEPHONE ENCOUNTER
SYNTHROID 150 MCG tablet   90 tablet 3 2/13/2025       Anil Zepeda, I m down in [place] right now and my refill is being sent to [place] where I normally pick it up but,  I m running low and I m wondering if you can transfer my prescription to the [facility] on    [address] [place]  Thank you, Yasmine Sutton   [address] [place]  [address]  [facility]

## 2025-03-26 ENCOUNTER — TELEPHONE (OUTPATIENT)
Dept: ENDOCRINOLOGY | Facility: CLINIC | Age: 70
End: 2025-03-26
Payer: MEDICARE

## 2025-03-26 DIAGNOSIS — E03.9 ACQUIRED HYPOTHYROIDISM: Primary | ICD-10-CM

## 2025-03-26 NOTE — TELEPHONE ENCOUNTER
[facility] Call Center    Phone Message    May a detailed message be left on voicemail: yes     Reason for Call: Other: Patient calling stating she has 5 days left of her synthroid she is wondering what to do next, she is wondering is she needs labs. Patient is wondering if it needs to be adjusted.      Action Taken: Message routed to:  [facility] endo    Travel Screening: Not Applicable     Date of Service:

## 2025-03-26 NOTE — TELEPHONE ENCOUNTER
Called pt and relayed message from provider letting her know that lab orders are now placed and we should have the results the following day. Provider will refill based on the results.  Also let her know that schedulers will be calling her to help schedule a follow-up.    Pt verbalized understanding and agreed to have lab drawn.     RESHMA Burgess  Adult Endocrine Clinic

## 2025-03-26 NOTE — TELEPHONE ENCOUNTER
3/26 Called and spoke to patient, appointment is currently scheduled.     [facility]   Orthopedics, Podiatry, Sports Medicine, Ent ,Eye , Audiology, Adult Endocrine & Diabetes, Nutrition & Medication Therapy Management Specialties   [facility]

## 2025-03-29 ENCOUNTER — MYC MEDICAL ADVICE (OUTPATIENT)
Dept: ENDOCRINOLOGY | Facility: CLINIC | Age: 70
End: 2025-03-29
Payer: MEDICARE

## 2025-04-02 DIAGNOSIS — E03.9 ACQUIRED HYPOTHYROIDISM: ICD-10-CM

## 2025-04-02 RX ORDER — LEVOTHYROXINE SODIUM 150 MCG
TABLET ORAL
Qty: 25 TABLET | Refills: 2 | Status: SHIPPED | OUTPATIENT
Start: 2025-04-02

## 2025-05-06 ENCOUNTER — PATIENT OUTREACH (OUTPATIENT)
Dept: CARE COORDINATION | Facility: CLINIC | Age: 70
End: 2025-05-06
Payer: MEDICARE

## 2025-05-15 ENCOUNTER — PATIENT OUTREACH (OUTPATIENT)
Dept: CARE COORDINATION | Facility: CLINIC | Age: 70
End: 2025-05-15
Payer: MEDICARE

## 2025-05-19 ENCOUNTER — PATIENT OUTREACH (OUTPATIENT)
Dept: CARE COORDINATION | Facility: CLINIC | Age: 70
End: 2025-05-19
Payer: MEDICARE

## 2025-06-03 ENCOUNTER — PATIENT OUTREACH (OUTPATIENT)
Dept: CARE COORDINATION | Facility: CLINIC | Age: 70
End: 2025-06-03
Payer: MEDICARE

## 2025-07-08 SDOH — HEALTH STABILITY: PHYSICAL HEALTH: ON AVERAGE, HOW MANY DAYS PER WEEK DO YOU ENGAGE IN MODERATE TO STRENUOUS EXERCISE (LIKE A BRISK WALK)?: 6 DAYS

## 2025-07-08 SDOH — HEALTH STABILITY: PHYSICAL HEALTH: ON AVERAGE, HOW MANY MINUTES DO YOU ENGAGE IN EXERCISE AT THIS LEVEL?: 0 MIN

## 2025-07-08 ASSESSMENT — SOCIAL DETERMINANTS OF HEALTH (SDOH): HOW OFTEN DO YOU GET TOGETHER WITH FRIENDS OR RELATIVES?: MORE THAN THREE TIMES A WEEK

## 2025-07-09 ENCOUNTER — OFFICE VISIT (OUTPATIENT)
Dept: FAMILY MEDICINE | Facility: CLINIC | Age: 70
End: 2025-07-09
Attending: PHYSICIAN ASSISTANT
Payer: MEDICARE

## 2025-07-09 VITALS
DIASTOLIC BLOOD PRESSURE: 68 MMHG | RESPIRATION RATE: 16 BRPM | BODY MASS INDEX: 29.59 KG/M2 | HEIGHT: 63 IN | OXYGEN SATURATION: 95 % | TEMPERATURE: 98.1 F | WEIGHT: 167 LBS | HEART RATE: 58 BPM | SYSTOLIC BLOOD PRESSURE: 124 MMHG

## 2025-07-09 DIAGNOSIS — Z00.00 ENCOUNTER FOR MEDICARE ANNUAL WELLNESS EXAM: Primary | ICD-10-CM

## 2025-07-09 DIAGNOSIS — Z12.11 SCREEN FOR COLON CANCER: ICD-10-CM

## 2025-07-09 DIAGNOSIS — Z12.31 VISIT FOR SCREENING MAMMOGRAM: ICD-10-CM

## 2025-07-09 DIAGNOSIS — E78.00 ELEVATED LDL CHOLESTEROL LEVEL: ICD-10-CM

## 2025-07-09 PROCEDURE — 3078F DIAST BP <80 MM HG: CPT | Performed by: PHYSICIAN ASSISTANT

## 2025-07-09 PROCEDURE — 99213 OFFICE O/P EST LOW 20 MIN: CPT | Mod: 25 | Performed by: PHYSICIAN ASSISTANT

## 2025-07-09 PROCEDURE — G0439 PPPS, SUBSEQ VISIT: HCPCS | Performed by: PHYSICIAN ASSISTANT

## 2025-07-09 PROCEDURE — 3074F SYST BP LT 130 MM HG: CPT | Performed by: PHYSICIAN ASSISTANT

## 2025-07-09 RX ORDER — ROSUVASTATIN CALCIUM 5 MG/1
5 TABLET, COATED ORAL DAILY
Qty: 90 TABLET | Refills: 3 | Status: SHIPPED | OUTPATIENT
Start: 2025-07-09

## 2025-07-09 RX ORDER — LEVOTHYROXINE SODIUM 137 UG/1
TABLET ORAL DAILY
COMMUNITY
Start: 2025-06-10

## 2025-07-09 NOTE — PROGRESS NOTES
"Preventive Care Visit  [facility]  Ramona Ann Aaseby-Aguilera, PA-C, Family Medicine  Jul 9, 2025      Assessment & Plan     Encounter for Medicare annual wellness exam  Age and gender appropriate preventive care and screenings are discussed.  Particular attention to personal preventive care and age appropriate lifestyle including the incorporation of healthy diet and physical activity is made       Elevated LDL cholesterol level  Had prior MI and cardiologist recommended a moderate intensitiy statin.  Discussed this and agreed to start on low dose statin.  Risks, benefits, side effects and intended purposes discussed.    Will retest lipids in 6 months   - rosuvastatin (CRESTOR) 5 MG tablet; Take 1 tablet (5 mg) by mouth daily.  - Lipid panel reflex to direct LDL Fasting; Future    Screen for colon cancer    - Colonoscopy Screening  Referral; Future    Visit for screening mammogram    - MA Screening Bilateral w/ Thaddeus; Future      BMI  Estimated body mass index is 29.82 kg/m  as calculated from the following:    Height as of this encounter: 1.594 m (5' 2.75\").    Weight as of this encounter: 75.8 kg (167 lb).   Weight management plan: Discussed healthy diet and exercise guidelines    Counseling  Appropriate preventive services were addressed with this patient via screening, questionnaire, or discussion as appropriate for fall prevention, nutrition, physical activity, Tobacco-use cessation, social engagement, weight loss and cognition.  Checklist reviewing preventive services available has been given to the patient.  Reviewed patient's diet, addressing concerns and/or questions.   The patient was instructed to see the dentist every 6 months.   Reviewed preventive health counseling, as reflected in patient instructions       Regular exercise       Healthy diet/nutrition       Vision screening       Hearing screening        Angelica Underwood is a 69 year old, presenting for the " following:  Wellness Visit  The ASCVD Risk score (Frank STARR, et al., 2019) failed to calculate for the following reasons:    The valid HDL cholesterol range is 20 to 100 mg/dL        7/9/2025     9:00 AM   Additional Questions   Roomed by Jarvis Ferreira   Accompanied by self          HPI           Advance Care Planning    Discussed advance care planning with patient; however, patient declined at this time.        7/8/2025   General Health   How would you rate your overall physical health? Good   Feel stress (tense, anxious, or unable to sleep) Only a little   (!) STRESS CONCERN      7/8/2025   Nutrition   Diet: Other   If other, please elaborate: I eat a late breakfast,around 10:-10:30, then dinner at4-4:30. Try not to snack but if i do i like yogurt, popcorn, or apples and cheese.         7/8/2025   Exercise   Days per week of moderate/strenous exercise 6 days   Average minutes spent exercising at this level 0 min         7/8/2025   Social Factors   Frequency of gathering with friends or relatives More than three times a week   Worry food won't last until get money to buy more Patient declined   Food not last or not have enough money for food? No   Do you have housing? (Housing is defined as stable permanent housing and does not include staying outside in a car, in a tent, in an abandoned building, in an overnight shelter, or couch-surfing.) Patient declined   Are you worried about losing your housing? Patient declined   Lack of transportation? Patient declined   Unable to get utilities (heat,electricity)? Patient declined         7/8/2025   Fall Risk   Fallen 2 or more times in the past year? No   Trouble with walking or balance? No          7/8/2025   Activities of Daily Living- Home Safety   Needs help with the following daily activites None of the above   Safety concerns in the home None of the above         7/8/2025   Dental   Dentist two times every year? (!) NO         7/8/2025   Hearing Screening   Hearing  concerns? None of the above         2025   Driving Risk Screening   Patient/family members have concerns about driving No         2025   General Alertness/Fatigue Screening   Have you been more tired than usual lately? No         2025   Urinary Incontinence Screening   Bothered by leaking urine in past 6 months No       Today's PHQ-9 Score:       2025     6:33 PM   PHQ-9 SCORE   PHQ-9 Total Score MyChart 0   PHQ-9 Total Score 0        Patient-reported         2025   Substance Use   Alcohol more than 3/day or more than 7/wk No   Do you have a current opioid prescription? No   How severe/bad is pain from 1 to 10? 0/10 (No Pain)   Do you use any other substances recreationally? No     Social History     Tobacco Use    Smoking status: Former     Current packs/day: 0.00     Average packs/day: 0.5 packs/day for 18.0 years (9.0 ttl pk-yrs)     Types: Cigarettes     Start date: 1993     Quit date: 2011     Years since quittin.5    Smokeless tobacco: Former    Tobacco comments:     e cig for a short time   Vaping Use    Vaping status: Never Used   Substance Use Topics    Alcohol use: Yes     Alcohol/week: 5.0 standard drinks of alcohol     Types: 5 Standard drinks or equivalent per week     Comment: occasionally    Drug use: No           2023   LAST FHS-7 RESULTS   1st degree relative breast or ovarian cancer No   Any relative bilateral breast cancer No   Any male have breast cancer No   Any ONE woman have BOTH breast AND ovarian cancer No   Any woman with breast cancer before 50yrs No   2 or more relatives with breast AND/OR ovarian cancer No   2 or more relatives with breast AND/OR bowel cancer No        Mammogram Screening - Mammogram every 1-2 years updated in Health Maintenance based on mutual decision making      History of abnormal Pap smear: Status post hysterectomy with removal of cervix and no history of CIN2 or greater or cervical cancer. Health Maintenance and Surgical History  updated.        9/22/2014    12:00 AM 3/10/2014    12:00 AM 11/27/2012    10:41 AM   PAP / HPV   PAP (Historical) ASC-H  NIL  NIL      ASCVD Risk   The ASCVD Risk score (Frank STARR, et al., 2019) failed to calculate for the following reasons:    The valid HDL cholesterol range is 20 to 100 mg/dL            Reviewed and updated as needed this visit by Provider                      Current providers sharing in care for this patient include:  Patient Care Team:  Aaseby-Aguilera, Ramona Ann, PA-C as PCP - General (Family Medicine)  Aaseby-Aguilera, Ramona Ann, PA-C as Assigned PCP  Katelyn Meza PA-C as Assigned Endocrinology Provider  Malcolm Montano MD as MD (Cardiovascular Disease)  Wiliam Toledo NP as Assigned Heart and Vascular Provider    The following health maintenance items are reviewed in Epic and correct as of today:  Health Maintenance   Topic Date Due    DEPRESSION ACTION PLAN  Never done    PNEUMOCOCCAL VACCINE 50+ YEARS (1 of 2 - PCV) Never done    ZOSTER VACCINE (1 of 2) Never done    LUNG CANCER SCREENING  Never done    RSV VACCINE (1 - Risk 60-74 years 1-dose series) Never done    COLORECTAL CANCER SCREENING  05/02/2023    COVID-19 VACCINE (3 - 2024-25 season) 09/01/2024    ANNUAL REVIEW OF HM ORDERS  06/27/2025    MAMMO SCREENING  06/05/2025    INFLUENZA VACCINE (1) 09/01/2025    LIPID  11/20/2025    PHQ-9  01/09/2026    TSH W/FREE T4 REFLEX  03/28/2026    MEDICARE ANNUAL WELLNESS VISIT  07/09/2026    FALL RISK ASSESSMENT  07/09/2026    DIABETES SCREENING  03/28/2028    ADVANCE CARE PLANNING  06/27/2029    DTAP/TDAP/TD VACCINE (2 - Td or Tdap) 05/03/2032    DEXA  06/27/2039    HEPATITIS C SCREENING  Completed    HPV VACCINE  Aged Out    MENINGITIS VACCINE  Aged Out    PAP  Discontinued         Review of Systems  CONSTITUTIONAL: NEGATIVE for fever, chills, change in weight  INTEGUMENTARY/SKIN: NEGATIVE for worrisome rashes, moles or lesions  EYES: NEGATIVE for vision changes or  "irritation  ENT/MOUTH: NEGATIVE for ear, mouth and throat problems  RESP: NEGATIVE for significant cough or SOB  BREAST: NEGATIVE for masses, tenderness or discharge  CV: NEGATIVE for chest pain, palpitations or peripheral edema  GI: NEGATIVE for nausea, abdominal pain, heartburn, or change in bowel habits  : NEGATIVE for frequency, dysuria, or hematuria  MUSCULOSKELETAL: NEGATIVE for significant arthralgias or myalgia  NEURO: NEGATIVE for weakness, dizziness or paresthesias  ENDOCRINE: NEGATIVE for temperature intolerance, skin/hair changes  HEME: NEGATIVE for bleeding problems  PSYCHIATRIC: NEGATIVE for changes in mood or affect     Objective    Exam  LMP 05/08/2008 (Approximate)    Estimated body mass index is 30.33 kg/m  as calculated from the following:    Height as of 9/20/24: 1.588 m (5' 2.5\").    Weight as of 9/20/24: 76.4 kg (168 lb 8 oz).    Physical Exam  GENERAL: alert and no distress  EYES: Eyes grossly normal to inspection, PERRL and conjunctivae and sclerae normal  HENT: ear canals and TM's normal, nose and mouth without ulcers or lesions  NECK: no adenopathy, no asymmetry, masses, or scars  RESP: lungs clear to auscultation - no rales, rhonchi or wheezes  BREAST: normal without masses, tenderness or nipple discharge and no palpable axillary masses or adenopathy  CV: regular rate and rhythm, normal S1 S2, no S3 or S4, no murmur, click or rub, no peripheral edema  ABDOMEN: soft, nontender, no hepatosplenomegaly, no masses and bowel sounds normal  MS: no gross musculoskeletal defects noted, no edema  SKIN: no suspicious lesions or rashes  NEURO: Normal strength and tone, mentation intact and speech normal  PSYCH: mentation appears normal, affect normal/bright        6/27/2024   Mini Cog   Clock Draw Score 2 Normal   3 Item Recall 3 objects recalled   Mini Cog Total Score 5              Signed Electronically by: Ramona Ann Aaseby-Aguilera, PA-C    Answers submitted by the patient for this " visit:  Patient Health Questionnaire (Submitted on 7/8/2025)  If you checked off any problems, how difficult have these problems made it for you to do your work, take care of things at home, or get along with other people?: Not difficult at all  PHQ9 TOTAL SCORE: 0

## 2025-07-09 NOTE — PATIENT INSTRUCTIONS
Patient Education   Preventive Care Advice   This is general advice given by our system to help you stay healthy. However, your care team may have specific advice just for you. Please talk to your care team about your preventive care needs.  Nutrition  Eat 5 or more servings of fruits and vegetables each day.  Try wheat bread, brown rice and whole grain pasta (instead of white bread, rice, and pasta).  Get enough calcium and vitamin D. Check the label on foods and aim for 100% of the RDA (recommended daily allowance).  Lifestyle  Exercise at least 150 minutes each week  (30 minutes a day, 5 days a week).  Do muscle strengthening activities 2 days a week. These help control your weight and prevent disease.  No smoking.  Wear sunscreen to prevent skin cancer.  Have a dental exam and cleaning every 6 months.  Yearly exams  See your health care team every year to talk about:  Any changes in your health.  Any medicines your care team has prescribed.  Preventive care, family planning, and ways to prevent chronic diseases.  Shots (vaccines)   HPV shots (up to age 26), if you've never had them before.  Hepatitis B shots (up to age 59), if you've never had them before.  COVID-19 shot: Get this shot when it's due.  Flu shot: Get a flu shot every year.  Tetanus shot: Get a tetanus shot every 10 years.  Pneumococcal, hepatitis A, and RSV shots: Ask your care team if you need these based on your risk.  Shingles shot (for age 50 and up)  General health tests  Diabetes screening:  Starting at age 35, Get screened for diabetes at least every 3 years.  If you are younger than age 35, ask your care team if you should be screened for diabetes.  Cholesterol test: At age 39, start having a cholesterol test every 5 years, or more often if advised.  Bone density scan (DEXA): At age 50, ask your care team if you should have this scan for osteoporosis (brittle bones).  Hepatitis C: Get tested at least once in your life.  STIs (sexually  transmitted infections)  Before age 24: Ask your care team if you should be screened for STIs.  After age 24: Get screened for STIs if you're at risk. You are at risk for STIs (including HIV) if:  You are sexually active with more than one person.  You don't use condoms every time.  You or a partner was diagnosed with a sexually transmitted infection.  If you are at risk for HIV, ask about PrEP medicine to prevent HIV.  Get tested for HIV at least once in your life, whether you are at risk for HIV or not.  Cancer screening tests  Cervical cancer screening: If you have a cervix, begin getting regular cervical cancer screening tests starting at age 21.  Breast cancer scan (mammogram): If you've ever had breasts, begin having regular mammograms starting at age 40. This is a scan to check for breast cancer.  Colon cancer screening: It is important to start screening for colon cancer at age 45.  Have a colonoscopy test every 10 years (or more often if you're at risk) Or, ask your provider about stool tests like a FIT test every year or Cologuard test every 3 years.  To learn more about your testing options, visit:   .  For help making a decision, visit:   https://bit.ly/lw95132.  Prostate cancer screening test: If you have a prostate, ask your care team if a prostate cancer screening test (PSA) at age 55 is right for you.  Lung cancer screening: If you are a current or former smoker ages 50 to 80, ask your care team if ongoing lung cancer screenings are right for you.  For informational purposes only. Not to replace the advice of your health care provider. Copyright   2023 [employer]. All rights reserved. Clinically reviewed by the [facility] Transitions Program. [employer] [id number] - REV 01/24.

## 2025-07-10 ENCOUNTER — PATIENT OUTREACH (OUTPATIENT)
Dept: CARE COORDINATION | Facility: CLINIC | Age: 70
End: 2025-07-10
Payer: MEDICARE

## 2025-08-16 ENCOUNTER — HEALTH MAINTENANCE LETTER (OUTPATIENT)
Age: 70
End: 2025-08-16